# Patient Record
Sex: FEMALE | Race: WHITE | NOT HISPANIC OR LATINO | Employment: OTHER | URBAN - NONMETROPOLITAN AREA
[De-identification: names, ages, dates, MRNs, and addresses within clinical notes are randomized per-mention and may not be internally consistent; named-entity substitution may affect disease eponyms.]

---

## 2018-03-08 ENCOUNTER — HOSPITAL ENCOUNTER (INPATIENT)
Facility: HOSPITAL | Age: 83
LOS: 5 days | Discharge: NURSING, REHAB OR LONG-TERM CARE | End: 2018-03-14
Attending: EMERGENCY MEDICINE | Admitting: INTERNAL MEDICINE

## 2018-03-08 ENCOUNTER — APPOINTMENT (OUTPATIENT)
Dept: GENERAL RADIOLOGY | Facility: HOSPITAL | Age: 83
End: 2018-03-08

## 2018-03-08 DIAGNOSIS — Z78.9 IMPAIRED MOBILITY AND ADLS: ICD-10-CM

## 2018-03-08 DIAGNOSIS — Z91.81 STATUS POST FALL: ICD-10-CM

## 2018-03-08 DIAGNOSIS — Z74.09 IMPAIRED MOBILITY AND ADLS: ICD-10-CM

## 2018-03-08 DIAGNOSIS — Z74.09 IMPAIRED FUNCTIONAL MOBILITY, BALANCE, GAIT, AND ENDURANCE: ICD-10-CM

## 2018-03-08 DIAGNOSIS — S72.492A OTHER CLOSED FRACTURE OF DISTAL END OF LEFT FEMUR, INITIAL ENCOUNTER (HCC): Primary | ICD-10-CM

## 2018-03-08 PROBLEM — I10 ESSENTIAL HYPERTENSION: Status: ACTIVE | Noted: 2018-03-08

## 2018-03-08 PROBLEM — M19.90 OSTEOARTHRITIS: Status: ACTIVE | Noted: 2018-03-08

## 2018-03-08 PROBLEM — S72.402A CLOSED FRACTURE OF LEFT DISTAL FEMUR (HCC): Status: ACTIVE | Noted: 2018-03-08

## 2018-03-08 LAB
ALBUMIN SERPL-MCNC: 3.9 G/DL (ref 3.5–5)
ALBUMIN/GLOB SERPL: 1.3 G/DL (ref 1–2)
ALP SERPL-CCNC: 72 U/L (ref 38–126)
ALT SERPL W P-5'-P-CCNC: 29 U/L (ref 13–69)
ANION GAP SERPL CALCULATED.3IONS-SCNC: 20.5 MMOL/L
APTT PPP: 24.7 SECONDS (ref 25–36)
AST SERPL-CCNC: 20 U/L (ref 15–46)
BASOPHILS # BLD AUTO: 0.08 10*3/MM3 (ref 0–0.2)
BASOPHILS NFR BLD AUTO: 0.5 % (ref 0–2.5)
BILIRUB SERPL-MCNC: 1.3 MG/DL (ref 0.2–1.3)
BILIRUB UR QL STRIP: ABNORMAL
BUN BLD-MCNC: 14 MG/DL (ref 7–20)
BUN/CREAT SERPL: 23.3 (ref 7.1–23.5)
CALCIUM SPEC-SCNC: 9.4 MG/DL (ref 8.4–10.2)
CHLORIDE SERPL-SCNC: 105 MMOL/L (ref 98–107)
CLARITY UR: ABNORMAL
CO2 SERPL-SCNC: 20 MMOL/L (ref 26–30)
COLOR UR: YELLOW
CREAT BLD-MCNC: 0.6 MG/DL (ref 0.6–1.3)
DEPRECATED RDW RBC AUTO: 50.9 FL (ref 37–54)
EOSINOPHIL # BLD AUTO: 0.09 10*3/MM3 (ref 0–0.7)
EOSINOPHIL NFR BLD AUTO: 0.6 % (ref 0–7)
ERYTHROCYTE [DISTWIDTH] IN BLOOD BY AUTOMATED COUNT: 14.7 % (ref 11.5–14.5)
GFR SERPL CREATININE-BSD FRML MDRD: 93 ML/MIN/1.73
GLOBULIN UR ELPH-MCNC: 3.1 GM/DL
GLUCOSE BLD-MCNC: 164 MG/DL (ref 74–98)
GLUCOSE UR STRIP-MCNC: NEGATIVE MG/DL
HCT VFR BLD AUTO: 36.9 % (ref 37–47)
HGB BLD-MCNC: 12.5 G/DL (ref 12–16)
HGB UR QL STRIP.AUTO: NEGATIVE
IMM GRANULOCYTES # BLD: 0.07 10*3/MM3 (ref 0–0.06)
IMM GRANULOCYTES NFR BLD: 0.4 % (ref 0–0.6)
INR PPP: 1.27 (ref 0.9–1.1)
KETONES UR QL STRIP: ABNORMAL
LEUKOCYTE ESTERASE UR QL STRIP.AUTO: NEGATIVE
LYMPHOCYTES # BLD AUTO: 0.88 10*3/MM3 (ref 0.6–3.4)
LYMPHOCYTES NFR BLD AUTO: 5.5 % (ref 10–50)
MCH RBC QN AUTO: 31.9 PG (ref 27–31)
MCHC RBC AUTO-ENTMCNC: 33.9 G/DL (ref 30–37)
MCV RBC AUTO: 94.1 FL (ref 81–99)
MONOCYTES # BLD AUTO: 0.79 10*3/MM3 (ref 0–0.9)
MONOCYTES NFR BLD AUTO: 4.9 % (ref 0–12)
NEUTROPHILS # BLD AUTO: 14.16 10*3/MM3 (ref 2–6.9)
NEUTROPHILS NFR BLD AUTO: 88.1 % (ref 37–80)
NITRITE UR QL STRIP: NEGATIVE
NRBC BLD MANUAL-RTO: 0 /100 WBC (ref 0–0)
PH UR STRIP.AUTO: 5.5 [PH] (ref 5–8)
PLATELET # BLD AUTO: 327 10*3/MM3 (ref 130–400)
PMV BLD AUTO: 10.5 FL (ref 6–12)
POTASSIUM BLD-SCNC: 3.5 MMOL/L (ref 3.5–5.1)
PROT SERPL-MCNC: 7 G/DL (ref 6.3–8.2)
PROT UR QL STRIP: ABNORMAL
PROTHROMBIN TIME: 14.1 SECONDS (ref 9.3–12.1)
RBC # BLD AUTO: 3.92 10*6/MM3 (ref 4.2–5.4)
SODIUM BLD-SCNC: 142 MMOL/L (ref 137–145)
SP GR UR STRIP: 1.02 (ref 1–1.03)
TROPONIN I SERPL-MCNC: <0.012 NG/ML (ref 0–0.03)
UROBILINOGEN UR QL STRIP: ABNORMAL
WBC NRBC COR # BLD: 16.07 10*3/MM3 (ref 4.8–10.8)

## 2018-03-08 PROCEDURE — 71045 X-RAY EXAM CHEST 1 VIEW: CPT

## 2018-03-08 PROCEDURE — 84484 ASSAY OF TROPONIN QUANT: CPT | Performed by: EMERGENCY MEDICINE

## 2018-03-08 PROCEDURE — 99220 PR INITIAL OBSERVATION CARE/DAY 70 MINUTES: CPT | Performed by: INTERNAL MEDICINE

## 2018-03-08 PROCEDURE — 25010000002 FENTANYL CITRATE (PF) 100 MCG/2ML SOLUTION: Performed by: EMERGENCY MEDICINE

## 2018-03-08 PROCEDURE — 85610 PROTHROMBIN TIME: CPT | Performed by: EMERGENCY MEDICINE

## 2018-03-08 PROCEDURE — 80053 COMPREHEN METABOLIC PANEL: CPT | Performed by: EMERGENCY MEDICINE

## 2018-03-08 PROCEDURE — G0378 HOSPITAL OBSERVATION PER HR: HCPCS

## 2018-03-08 PROCEDURE — 87081 CULTURE SCREEN ONLY: CPT | Performed by: INTERNAL MEDICINE

## 2018-03-08 PROCEDURE — 25010000002 LORAZEPAM PER 2 MG

## 2018-03-08 PROCEDURE — 93005 ELECTROCARDIOGRAM TRACING: CPT | Performed by: INTERNAL MEDICINE

## 2018-03-08 PROCEDURE — P9612 CATHETERIZE FOR URINE SPEC: HCPCS

## 2018-03-08 PROCEDURE — 73552 X-RAY EXAM OF FEMUR 2/>: CPT

## 2018-03-08 PROCEDURE — 99284 EMERGENCY DEPT VISIT MOD MDM: CPT

## 2018-03-08 PROCEDURE — 93005 ELECTROCARDIOGRAM TRACING: CPT | Performed by: EMERGENCY MEDICINE

## 2018-03-08 PROCEDURE — 85730 THROMBOPLASTIN TIME PARTIAL: CPT | Performed by: EMERGENCY MEDICINE

## 2018-03-08 PROCEDURE — 81003 URINALYSIS AUTO W/O SCOPE: CPT | Performed by: EMERGENCY MEDICINE

## 2018-03-08 PROCEDURE — 85025 COMPLETE CBC W/AUTO DIFF WBC: CPT | Performed by: EMERGENCY MEDICINE

## 2018-03-08 PROCEDURE — 25010000002 LORAZEPAM PER 2 MG: Performed by: EMERGENCY MEDICINE

## 2018-03-08 RX ORDER — ACETAMINOPHEN 500 MG
500 TABLET ORAL EVERY 4 HOURS PRN
COMMUNITY
End: 2018-03-14 | Stop reason: HOSPADM

## 2018-03-08 RX ORDER — TRAVOPROST OPHTHALMIC SOLUTION 0.04 MG/ML
1 SOLUTION OPHTHALMIC EVERY EVENING
COMMUNITY

## 2018-03-08 RX ORDER — SODIUM CHLORIDE 9 MG/ML
125 INJECTION, SOLUTION INTRAVENOUS CONTINUOUS
Status: DISCONTINUED | OUTPATIENT
Start: 2018-03-09 | End: 2018-03-11

## 2018-03-08 RX ORDER — HYDROCODONE BITARTRATE AND ACETAMINOPHEN 5; 325 MG/1; MG/1
1 TABLET ORAL EVERY 6 HOURS
Status: ON HOLD | COMMUNITY
End: 2018-03-14

## 2018-03-08 RX ORDER — AMLODIPINE BESYLATE 5 MG/1
5 TABLET ORAL DAILY
Status: DISCONTINUED | OUTPATIENT
Start: 2018-03-09 | End: 2018-03-10

## 2018-03-08 RX ORDER — NYSTATIN 100000 [USP'U]/G
1 POWDER TOPICAL DAILY
COMMUNITY
End: 2018-03-14 | Stop reason: HOSPADM

## 2018-03-08 RX ORDER — SODIUM CHLORIDE 0.9 % (FLUSH) 0.9 %
1-10 SYRINGE (ML) INJECTION AS NEEDED
Status: DISCONTINUED | OUTPATIENT
Start: 2018-03-08 | End: 2018-03-14 | Stop reason: HOSPADM

## 2018-03-08 RX ORDER — MORPHINE SULFATE 2 MG/ML
1 INJECTION, SOLUTION INTRAMUSCULAR; INTRAVENOUS EVERY 4 HOURS PRN
Status: DISCONTINUED | OUTPATIENT
Start: 2018-03-08 | End: 2018-03-09

## 2018-03-08 RX ORDER — TRAMADOL HYDROCHLORIDE 50 MG/1
50 TABLET ORAL 4 TIMES DAILY PRN
Status: ON HOLD | COMMUNITY
End: 2018-03-14

## 2018-03-08 RX ORDER — FENTANYL CITRATE 50 UG/ML
50 INJECTION, SOLUTION INTRAMUSCULAR; INTRAVENOUS ONCE
Status: COMPLETED | OUTPATIENT
Start: 2018-03-08 | End: 2018-03-08

## 2018-03-08 RX ORDER — AMLODIPINE BESYLATE 5 MG/1
5 TABLET ORAL DAILY
COMMUNITY

## 2018-03-08 RX ORDER — LORAZEPAM 2 MG/ML
INJECTION INTRAMUSCULAR
Status: COMPLETED
Start: 2018-03-08 | End: 2018-03-08

## 2018-03-08 RX ORDER — ACETAMINOPHEN 325 MG/1
650 TABLET ORAL EVERY 4 HOURS PRN
Status: DISCONTINUED | OUTPATIENT
Start: 2018-03-08 | End: 2018-03-09

## 2018-03-08 RX ORDER — LORAZEPAM 0.5 MG/1
0.5 TABLET ORAL 2 TIMES DAILY PRN
COMMUNITY
End: 2018-03-14 | Stop reason: HOSPADM

## 2018-03-08 RX ORDER — LORAZEPAM 2 MG/ML
INJECTION INTRAMUSCULAR
Status: DISPENSED
Start: 2018-03-08 | End: 2018-03-09

## 2018-03-08 RX ORDER — IPRATROPIUM BROMIDE AND ALBUTEROL SULFATE 2.5; .5 MG/3ML; MG/3ML
3 SOLUTION RESPIRATORY (INHALATION) EVERY 4 HOURS PRN
COMMUNITY
End: 2018-03-14 | Stop reason: HOSPADM

## 2018-03-08 RX ORDER — UREA 10 %
3 LOTION (ML) TOPICAL NIGHTLY
Status: ON HOLD | COMMUNITY
End: 2018-03-09

## 2018-03-08 RX ORDER — LATANOPROST 50 UG/ML
1 SOLUTION/ DROPS OPHTHALMIC NIGHTLY
Status: DISCONTINUED | OUTPATIENT
Start: 2018-03-09 | End: 2018-03-14 | Stop reason: HOSPADM

## 2018-03-08 RX ORDER — ALUMINA, MAGNESIA, AND SIMETHICONE 2400; 2400; 240 MG/30ML; MG/30ML; MG/30ML
30 SUSPENSION ORAL EVERY 6 HOURS PRN
COMMUNITY
End: 2018-03-14 | Stop reason: HOSPADM

## 2018-03-08 RX ORDER — LORAZEPAM 2 MG/ML
0.5 INJECTION INTRAMUSCULAR ONCE
Status: COMPLETED | OUTPATIENT
Start: 2018-03-08 | End: 2018-03-08

## 2018-03-08 RX ORDER — NALOXONE HCL 0.4 MG/ML
0.4 VIAL (ML) INJECTION
Status: DISCONTINUED | OUTPATIENT
Start: 2018-03-08 | End: 2018-03-09

## 2018-03-08 RX ORDER — ONDANSETRON 2 MG/ML
4 INJECTION INTRAMUSCULAR; INTRAVENOUS EVERY 6 HOURS PRN
Status: DISCONTINUED | OUTPATIENT
Start: 2018-03-08 | End: 2018-03-14 | Stop reason: HOSPADM

## 2018-03-08 RX ORDER — HYDROCODONE BITARTRATE AND ACETAMINOPHEN 5; 325 MG/1; MG/1
1 TABLET ORAL EVERY 6 HOURS PRN
Status: DISCONTINUED | OUTPATIENT
Start: 2018-03-08 | End: 2018-03-14 | Stop reason: HOSPADM

## 2018-03-08 RX ADMIN — LORAZEPAM 0.5 MG: 2 INJECTION INTRAMUSCULAR; INTRAVENOUS at 22:02

## 2018-03-08 RX ADMIN — LORAZEPAM 0.5 MG: 2 INJECTION INTRAMUSCULAR; INTRAVENOUS at 22:22

## 2018-03-08 RX ADMIN — FENTANYL CITRATE 50 MCG: 50 INJECTION, SOLUTION INTRAMUSCULAR; INTRAVENOUS at 20:50

## 2018-03-08 RX ADMIN — LORAZEPAM 0.5 MG: 2 INJECTION INTRAMUSCULAR at 22:02

## 2018-03-08 RX ADMIN — FENTANYL CITRATE 50 MCG: 50 INJECTION INTRAMUSCULAR; INTRAVENOUS at 20:11

## 2018-03-09 ENCOUNTER — APPOINTMENT (OUTPATIENT)
Dept: GENERAL RADIOLOGY | Facility: HOSPITAL | Age: 83
End: 2018-03-09

## 2018-03-09 ENCOUNTER — ANESTHESIA (OUTPATIENT)
Dept: PERIOP | Facility: HOSPITAL | Age: 83
End: 2018-03-09

## 2018-03-09 ENCOUNTER — ANESTHESIA EVENT (OUTPATIENT)
Dept: PERIOP | Facility: HOSPITAL | Age: 83
End: 2018-03-09

## 2018-03-09 LAB
ABO GROUP BLD: NORMAL
ABO GROUP BLD: NORMAL
ALBUMIN SERPL-MCNC: 3.7 G/DL (ref 3.5–5)
ALBUMIN/GLOB SERPL: 1.3 G/DL (ref 1–2)
ALP SERPL-CCNC: 66 U/L (ref 38–126)
ALT SERPL W P-5'-P-CCNC: 30 U/L (ref 13–69)
ANION GAP SERPL CALCULATED.3IONS-SCNC: 17.4 MMOL/L
ANION GAP SERPL CALCULATED.3IONS-SCNC: 21.9 MMOL/L
APTT PPP: 25.3 SECONDS (ref 25–36)
AST SERPL-CCNC: 20 U/L (ref 15–46)
BACTERIA UR QL AUTO: ABNORMAL /HPF
BASOPHILS # BLD AUTO: 0.07 10*3/MM3 (ref 0–0.2)
BASOPHILS # BLD AUTO: 0.07 10*3/MM3 (ref 0–0.2)
BASOPHILS NFR BLD AUTO: 0.6 % (ref 0–2.5)
BASOPHILS NFR BLD AUTO: 0.7 % (ref 0–2.5)
BILIRUB SERPL-MCNC: 1.6 MG/DL (ref 0.2–1.3)
BILIRUB UR QL STRIP: ABNORMAL
BLD GP AB SCN SERPL QL: NEGATIVE
BUN BLD-MCNC: 12 MG/DL (ref 7–20)
BUN BLD-MCNC: 9 MG/DL (ref 7–20)
BUN/CREAT SERPL: 18 (ref 7.1–23.5)
BUN/CREAT SERPL: 24 (ref 7.1–23.5)
CALCIUM SPEC-SCNC: 8.8 MG/DL (ref 8.4–10.2)
CALCIUM SPEC-SCNC: 9.5 MG/DL (ref 8.4–10.2)
CHLORIDE SERPL-SCNC: 107 MMOL/L (ref 98–107)
CHLORIDE SERPL-SCNC: 109 MMOL/L (ref 98–107)
CK SERPL-CCNC: 33 U/L (ref 30–170)
CLARITY UR: ABNORMAL
CO2 SERPL-SCNC: 21 MMOL/L (ref 26–30)
CO2 SERPL-SCNC: 21 MMOL/L (ref 26–30)
COLOR UR: YELLOW
CREAT BLD-MCNC: 0.5 MG/DL (ref 0.6–1.3)
CREAT BLD-MCNC: 0.5 MG/DL (ref 0.6–1.3)
DEPRECATED RDW RBC AUTO: 48.5 FL (ref 37–54)
DEPRECATED RDW RBC AUTO: 50.8 FL (ref 37–54)
EOSINOPHIL # BLD AUTO: 0.01 10*3/MM3 (ref 0–0.7)
EOSINOPHIL # BLD AUTO: 0.06 10*3/MM3 (ref 0–0.7)
EOSINOPHIL NFR BLD AUTO: 0.1 % (ref 0–7)
EOSINOPHIL NFR BLD AUTO: 0.6 % (ref 0–7)
ERYTHROCYTE [DISTWIDTH] IN BLOOD BY AUTOMATED COUNT: 14.6 % (ref 11.5–14.5)
ERYTHROCYTE [DISTWIDTH] IN BLOOD BY AUTOMATED COUNT: 14.7 % (ref 11.5–14.5)
GFR SERPL CREATININE-BSD FRML MDRD: 115 ML/MIN/1.73
GFR SERPL CREATININE-BSD FRML MDRD: 115 ML/MIN/1.73
GLOBULIN UR ELPH-MCNC: 2.8 GM/DL
GLUCOSE BLD-MCNC: 117 MG/DL (ref 74–98)
GLUCOSE BLD-MCNC: 123 MG/DL (ref 74–98)
GLUCOSE UR STRIP-MCNC: NEGATIVE MG/DL
HCT VFR BLD AUTO: 31.6 % (ref 37–47)
HCT VFR BLD AUTO: 36 % (ref 37–47)
HCT VFR BLD AUTO: 38.2 % (ref 37–47)
HGB BLD-MCNC: 10.3 G/DL (ref 12–16)
HGB BLD-MCNC: 12.2 G/DL (ref 12–16)
HGB BLD-MCNC: 13.2 G/DL (ref 12–16)
HGB UR QL STRIP.AUTO: NEGATIVE
HYALINE CASTS UR QL AUTO: ABNORMAL /LPF
IMM GRANULOCYTES # BLD: 0.05 10*3/MM3 (ref 0–0.06)
IMM GRANULOCYTES # BLD: 0.08 10*3/MM3 (ref 0–0.06)
IMM GRANULOCYTES NFR BLD: 0.5 % (ref 0–0.6)
IMM GRANULOCYTES NFR BLD: 0.7 % (ref 0–0.6)
INR PPP: 1.31 (ref 0.9–1.1)
KETONES UR QL STRIP: ABNORMAL
LEUKOCYTE ESTERASE UR QL STRIP.AUTO: NEGATIVE
LYMPHOCYTES # BLD AUTO: 1.21 10*3/MM3 (ref 0.6–3.4)
LYMPHOCYTES # BLD AUTO: 1.28 10*3/MM3 (ref 0.6–3.4)
LYMPHOCYTES NFR BLD AUTO: 10.7 % (ref 10–50)
LYMPHOCYTES NFR BLD AUTO: 11.7 % (ref 10–50)
MAGNESIUM SERPL-MCNC: 1.9 MG/DL (ref 1.6–2.3)
MCH RBC QN AUTO: 31.6 PG (ref 27–31)
MCH RBC QN AUTO: 31.9 PG (ref 27–31)
MCHC RBC AUTO-ENTMCNC: 33.9 G/DL (ref 30–37)
MCHC RBC AUTO-ENTMCNC: 34.6 G/DL (ref 30–37)
MCV RBC AUTO: 91.4 FL (ref 81–99)
MCV RBC AUTO: 94 FL (ref 81–99)
MONOCYTES # BLD AUTO: 0.94 10*3/MM3 (ref 0–0.9)
MONOCYTES # BLD AUTO: 0.94 10*3/MM3 (ref 0–0.9)
MONOCYTES NFR BLD AUTO: 7.9 % (ref 0–12)
MONOCYTES NFR BLD AUTO: 9.1 % (ref 0–12)
NEUTROPHILS # BLD AUTO: 7.99 10*3/MM3 (ref 2–6.9)
NEUTROPHILS # BLD AUTO: 9.53 10*3/MM3 (ref 2–6.9)
NEUTROPHILS NFR BLD AUTO: 77.4 % (ref 37–80)
NEUTROPHILS NFR BLD AUTO: 80 % (ref 37–80)
NITRITE UR QL STRIP: NEGATIVE
NRBC BLD MANUAL-RTO: 0 /100 WBC (ref 0–0)
NRBC BLD MANUAL-RTO: 0 /100 WBC (ref 0–0)
PH UR STRIP.AUTO: 7 [PH] (ref 5–8)
PLATELET # BLD AUTO: 332 10*3/MM3 (ref 130–400)
PLATELET # BLD AUTO: 336 10*3/MM3 (ref 130–400)
PMV BLD AUTO: 10.2 FL (ref 6–12)
PMV BLD AUTO: 11.3 FL (ref 6–12)
POTASSIUM BLD-SCNC: 3.4 MMOL/L (ref 3.5–5.1)
POTASSIUM BLD-SCNC: 3.9 MMOL/L (ref 3.5–5.1)
PROT SERPL-MCNC: 6.5 G/DL (ref 6.3–8.2)
PROT UR QL STRIP: ABNORMAL
PROTHROMBIN TIME: 14.6 SECONDS (ref 9.3–12.1)
RBC # BLD AUTO: 3.83 10*6/MM3 (ref 4.2–5.4)
RBC # BLD AUTO: 4.18 10*6/MM3 (ref 4.2–5.4)
RBC # UR: ABNORMAL /HPF
REF LAB TEST METHOD: ABNORMAL
RH BLD: POSITIVE
RH BLD: POSITIVE
SODIUM BLD-SCNC: 144 MMOL/L (ref 137–145)
SODIUM BLD-SCNC: 146 MMOL/L (ref 137–145)
SP GR UR STRIP: 1.02 (ref 1–1.03)
SQUAMOUS #/AREA URNS HPF: ABNORMAL /HPF
TROPONIN I SERPL-MCNC: <0.012 NG/ML (ref 0–0.03)
UROBILINOGEN UR QL STRIP: ABNORMAL
WBC NRBC COR # BLD: 10.32 10*3/MM3 (ref 4.8–10.8)
WBC NRBC COR # BLD: 11.91 10*3/MM3 (ref 4.8–10.8)
WBC UR QL AUTO: ABNORMAL /HPF

## 2018-03-09 PROCEDURE — 71045 X-RAY EXAM CHEST 1 VIEW: CPT

## 2018-03-09 PROCEDURE — C1713 ANCHOR/SCREW BN/BN,TIS/BN: HCPCS | Performed by: ORTHOPAEDIC SURGERY

## 2018-03-09 PROCEDURE — 82550 ASSAY OF CK (CPK): CPT | Performed by: INTERNAL MEDICINE

## 2018-03-09 PROCEDURE — 87086 URINE CULTURE/COLONY COUNT: CPT | Performed by: ORTHOPAEDIC SURGERY

## 2018-03-09 PROCEDURE — 86900 BLOOD TYPING SEROLOGIC ABO: CPT | Performed by: ORTHOPAEDIC SURGERY

## 2018-03-09 PROCEDURE — 0QSC04Z REPOSITION LEFT LOWER FEMUR WITH INTERNAL FIXATION DEVICE, OPEN APPROACH: ICD-10-PCS | Performed by: ORTHOPAEDIC SURGERY

## 2018-03-09 PROCEDURE — 85025 COMPLETE CBC W/AUTO DIFF WBC: CPT | Performed by: ORTHOPAEDIC SURGERY

## 2018-03-09 PROCEDURE — 25010000002 FENTANYL CITRATE (PF) 100 MCG/2ML SOLUTION: Performed by: NURSE ANESTHETIST, CERTIFIED REGISTERED

## 2018-03-09 PROCEDURE — 27506 TREATMENT OF THIGH FRACTURE: CPT | Performed by: ORTHOPAEDIC SURGERY

## 2018-03-09 PROCEDURE — 86901 BLOOD TYPING SEROLOGIC RH(D): CPT

## 2018-03-09 PROCEDURE — 76000 FLUOROSCOPY <1 HR PHYS/QHP: CPT

## 2018-03-09 PROCEDURE — 94799 UNLISTED PULMONARY SVC/PX: CPT

## 2018-03-09 PROCEDURE — 25010000002 MORPHINE PER 10 MG: Performed by: INTERNAL MEDICINE

## 2018-03-09 PROCEDURE — 99221 1ST HOSP IP/OBS SF/LOW 40: CPT | Performed by: PHYSICIAN ASSISTANT

## 2018-03-09 PROCEDURE — 85014 HEMATOCRIT: CPT | Performed by: INTERNAL MEDICINE

## 2018-03-09 PROCEDURE — 25010000002 ONDANSETRON PER 1 MG: Performed by: NURSE ANESTHETIST, CERTIFIED REGISTERED

## 2018-03-09 PROCEDURE — 94640 AIRWAY INHALATION TREATMENT: CPT

## 2018-03-09 PROCEDURE — 83735 ASSAY OF MAGNESIUM: CPT | Performed by: INTERNAL MEDICINE

## 2018-03-09 PROCEDURE — 85730 THROMBOPLASTIN TIME PARTIAL: CPT | Performed by: ORTHOPAEDIC SURGERY

## 2018-03-09 PROCEDURE — 86900 BLOOD TYPING SEROLOGIC ABO: CPT

## 2018-03-09 PROCEDURE — 81001 URINALYSIS AUTO W/SCOPE: CPT | Performed by: ORTHOPAEDIC SURGERY

## 2018-03-09 PROCEDURE — 86901 BLOOD TYPING SEROLOGIC RH(D): CPT | Performed by: ORTHOPAEDIC SURGERY

## 2018-03-09 PROCEDURE — 80048 BASIC METABOLIC PNL TOTAL CA: CPT | Performed by: INTERNAL MEDICINE

## 2018-03-09 PROCEDURE — 93005 ELECTROCARDIOGRAM TRACING: CPT | Performed by: ORTHOPAEDIC SURGERY

## 2018-03-09 PROCEDURE — 25010000002 PROPOFOL 200 MG/20ML EMULSION: Performed by: NURSE ANESTHETIST, CERTIFIED REGISTERED

## 2018-03-09 PROCEDURE — 25010000002 DEXAMETHASONE PER 1 MG: Performed by: NURSE ANESTHETIST, CERTIFIED REGISTERED

## 2018-03-09 PROCEDURE — 85610 PROTHROMBIN TIME: CPT | Performed by: ORTHOPAEDIC SURGERY

## 2018-03-09 PROCEDURE — 85018 HEMOGLOBIN: CPT | Performed by: INTERNAL MEDICINE

## 2018-03-09 PROCEDURE — 84484 ASSAY OF TROPONIN QUANT: CPT | Performed by: INTERNAL MEDICINE

## 2018-03-09 PROCEDURE — 86850 RBC ANTIBODY SCREEN: CPT | Performed by: ORTHOPAEDIC SURGERY

## 2018-03-09 PROCEDURE — 85025 COMPLETE CBC W/AUTO DIFF WBC: CPT | Performed by: INTERNAL MEDICINE

## 2018-03-09 PROCEDURE — 25010000002 METOCLOPRAMIDE PER 10 MG: Performed by: NURSE ANESTHETIST, CERTIFIED REGISTERED

## 2018-03-09 DEVICE — IMPLANTABLE DEVICE: Type: IMPLANTABLE DEVICE | Site: FEMUR | Status: FUNCTIONAL

## 2018-03-09 DEVICE — SCRW CORT DBL LD THRD TI 5X32MM: Type: IMPLANTABLE DEVICE | Site: FEMUR | Status: FUNCTIONAL

## 2018-03-09 RX ORDER — ONDANSETRON 2 MG/ML
4 INJECTION INTRAMUSCULAR; INTRAVENOUS EVERY 6 HOURS PRN
Status: DISCONTINUED | OUTPATIENT
Start: 2018-03-09 | End: 2018-03-14 | Stop reason: HOSPADM

## 2018-03-09 RX ORDER — MORPHINE SULFATE 2 MG/ML
2 INJECTION, SOLUTION INTRAMUSCULAR; INTRAVENOUS
Status: DISCONTINUED | OUTPATIENT
Start: 2018-03-09 | End: 2018-03-10

## 2018-03-09 RX ORDER — ROCURONIUM BROMIDE 10 MG/ML
INJECTION, SOLUTION INTRAVENOUS AS NEEDED
Status: DISCONTINUED | OUTPATIENT
Start: 2018-03-09 | End: 2018-03-09 | Stop reason: SURG

## 2018-03-09 RX ORDER — LANOLIN ALCOHOL/MO/W.PET/CERES
3 CREAM (GRAM) TOPICAL NIGHTLY
COMMUNITY

## 2018-03-09 RX ORDER — MORPHINE SULFATE 2 MG/ML
2 INJECTION, SOLUTION INTRAMUSCULAR; INTRAVENOUS
Status: DISCONTINUED | OUTPATIENT
Start: 2018-03-09 | End: 2018-03-09 | Stop reason: HOSPADM

## 2018-03-09 RX ORDER — ONDANSETRON 2 MG/ML
INJECTION INTRAMUSCULAR; INTRAVENOUS AS NEEDED
Status: DISCONTINUED | OUTPATIENT
Start: 2018-03-09 | End: 2018-03-09 | Stop reason: SURG

## 2018-03-09 RX ORDER — NALOXONE HCL 0.4 MG/ML
0.4 VIAL (ML) INJECTION
Status: DISCONTINUED | OUTPATIENT
Start: 2018-03-09 | End: 2018-03-14 | Stop reason: HOSPADM

## 2018-03-09 RX ORDER — SODIUM CHLORIDE, SODIUM LACTATE, POTASSIUM CHLORIDE, CALCIUM CHLORIDE 600; 310; 30; 20 MG/100ML; MG/100ML; MG/100ML; MG/100ML
100 INJECTION, SOLUTION INTRAVENOUS CONTINUOUS
Status: DISCONTINUED | OUTPATIENT
Start: 2018-03-09 | End: 2018-03-09

## 2018-03-09 RX ORDER — NALOXONE HCL 0.4 MG/ML
0.4 VIAL (ML) INJECTION
Status: DISCONTINUED | OUTPATIENT
Start: 2018-03-09 | End: 2018-03-09

## 2018-03-09 RX ORDER — MEPERIDINE HYDROCHLORIDE 50 MG/ML
25 INJECTION INTRAMUSCULAR; INTRAVENOUS; SUBCUTANEOUS
Status: DISCONTINUED | OUTPATIENT
Start: 2018-03-09 | End: 2018-03-09 | Stop reason: HOSPADM

## 2018-03-09 RX ORDER — FENTANYL CITRATE 50 UG/ML
INJECTION, SOLUTION INTRAMUSCULAR; INTRAVENOUS AS NEEDED
Status: DISCONTINUED | OUTPATIENT
Start: 2018-03-09 | End: 2018-03-09 | Stop reason: SURG

## 2018-03-09 RX ORDER — CLINDAMYCIN PHOSPHATE 600 MG/50ML
600 INJECTION, SOLUTION INTRAVENOUS EVERY 8 HOURS
Status: COMPLETED | OUTPATIENT
Start: 2018-03-10 | End: 2018-03-10

## 2018-03-09 RX ORDER — PROPOFOL 10 MG/ML
INJECTION, EMULSION INTRAVENOUS AS NEEDED
Status: DISCONTINUED | OUTPATIENT
Start: 2018-03-09 | End: 2018-03-09 | Stop reason: SURG

## 2018-03-09 RX ORDER — IPRATROPIUM BROMIDE AND ALBUTEROL SULFATE 2.5; .5 MG/3ML; MG/3ML
SOLUTION RESPIRATORY (INHALATION)
Status: COMPLETED
Start: 2018-03-09 | End: 2018-03-09

## 2018-03-09 RX ORDER — CLINDAMYCIN PHOSPHATE 900 MG/50ML
900 INJECTION, SOLUTION INTRAVENOUS ONCE
Status: COMPLETED | OUTPATIENT
Start: 2018-03-09 | End: 2018-03-09

## 2018-03-09 RX ORDER — MORPHINE SULFATE 2 MG/ML
2 INJECTION, SOLUTION INTRAMUSCULAR; INTRAVENOUS
Status: DISCONTINUED | OUTPATIENT
Start: 2018-03-09 | End: 2018-03-09

## 2018-03-09 RX ORDER — CLINDAMYCIN PHOSPHATE 150 MG/ML
INJECTION, SOLUTION INTRAVENOUS
Status: COMPLETED
Start: 2018-03-09 | End: 2018-03-09

## 2018-03-09 RX ORDER — SODIUM CHLORIDE 0.9 % (FLUSH) 0.9 %
1-10 SYRINGE (ML) INJECTION AS NEEDED
Status: DISCONTINUED | OUTPATIENT
Start: 2018-03-09 | End: 2018-03-14 | Stop reason: HOSPADM

## 2018-03-09 RX ORDER — DOCUSATE SODIUM 100 MG/1
100 CAPSULE, LIQUID FILLED ORAL 2 TIMES DAILY PRN
Status: DISCONTINUED | OUTPATIENT
Start: 2018-03-09 | End: 2018-03-14 | Stop reason: HOSPADM

## 2018-03-09 RX ORDER — DEXAMETHASONE SODIUM PHOSPHATE 4 MG/ML
INJECTION, SOLUTION INTRA-ARTICULAR; INTRALESIONAL; INTRAMUSCULAR; INTRAVENOUS; SOFT TISSUE AS NEEDED
Status: DISCONTINUED | OUTPATIENT
Start: 2018-03-09 | End: 2018-03-09 | Stop reason: SURG

## 2018-03-09 RX ORDER — BISACODYL 10 MG
10 SUPPOSITORY, RECTAL RECTAL DAILY PRN
Status: DISCONTINUED | OUTPATIENT
Start: 2018-03-09 | End: 2018-03-14 | Stop reason: HOSPADM

## 2018-03-09 RX ORDER — CHOLECALCIFEROL (VITAMIN D3) 50 MCG
2000 TABLET ORAL DAILY
COMMUNITY
End: 2021-01-14 | Stop reason: HOSPADM

## 2018-03-09 RX ORDER — IPRATROPIUM BROMIDE AND ALBUTEROL SULFATE 2.5; .5 MG/3ML; MG/3ML
3 SOLUTION RESPIRATORY (INHALATION) ONCE
Status: COMPLETED | OUTPATIENT
Start: 2018-03-09 | End: 2018-03-09

## 2018-03-09 RX ORDER — ESMOLOL HYDROCHLORIDE 10 MG/ML
INJECTION INTRAVENOUS AS NEEDED
Status: DISCONTINUED | OUTPATIENT
Start: 2018-03-09 | End: 2018-03-09 | Stop reason: SURG

## 2018-03-09 RX ORDER — ONDANSETRON 2 MG/ML
4 INJECTION INTRAMUSCULAR; INTRAVENOUS ONCE AS NEEDED
Status: DISCONTINUED | OUTPATIENT
Start: 2018-03-09 | End: 2018-03-09 | Stop reason: HOSPADM

## 2018-03-09 RX ORDER — METOCLOPRAMIDE HYDROCHLORIDE 5 MG/ML
INJECTION INTRAMUSCULAR; INTRAVENOUS AS NEEDED
Status: DISCONTINUED | OUTPATIENT
Start: 2018-03-09 | End: 2018-03-09 | Stop reason: SURG

## 2018-03-09 RX ORDER — ONDANSETRON 4 MG/1
4 TABLET, ORALLY DISINTEGRATING ORAL EVERY 6 HOURS PRN
Status: DISCONTINUED | OUTPATIENT
Start: 2018-03-09 | End: 2018-03-14 | Stop reason: HOSPADM

## 2018-03-09 RX ORDER — LANOLIN ALCOHOL/MO/W.PET/CERES
3 CREAM (GRAM) TOPICAL NIGHTLY
Status: DISCONTINUED | OUTPATIENT
Start: 2018-03-09 | End: 2018-03-14 | Stop reason: HOSPADM

## 2018-03-09 RX ORDER — ONDANSETRON 4 MG/1
4 TABLET, FILM COATED ORAL EVERY 6 HOURS PRN
Status: DISCONTINUED | OUTPATIENT
Start: 2018-03-09 | End: 2018-03-14 | Stop reason: HOSPADM

## 2018-03-09 RX ADMIN — MORPHINE SULFATE 1 MG: 2 INJECTION, SOLUTION INTRAMUSCULAR; INTRAVENOUS at 04:42

## 2018-03-09 RX ADMIN — CLINDAMYCIN PHOSPHATE: 150 INJECTION, SOLUTION INTRAMUSCULAR; INTRAVENOUS at 21:00

## 2018-03-09 RX ADMIN — Medication 10 ML: at 12:51

## 2018-03-09 RX ADMIN — Medication 10 ML: at 14:35

## 2018-03-09 RX ADMIN — MORPHINE SULFATE 2 MG: 2 INJECTION, SOLUTION INTRAMUSCULAR; INTRAVENOUS at 08:38

## 2018-03-09 RX ADMIN — FENTANYL CITRATE 25 MCG: 50 INJECTION, SOLUTION INTRAMUSCULAR; INTRAVENOUS at 18:39

## 2018-03-09 RX ADMIN — PROPOFOL 60 MG: 10 INJECTION, EMULSION INTRAVENOUS at 18:05

## 2018-03-09 RX ADMIN — ESMOLOL HYDROCHLORIDE 30 MG: 10 INJECTION, SOLUTION INTRAVENOUS at 20:25

## 2018-03-09 RX ADMIN — FENTANYL CITRATE 25 MCG: 50 INJECTION, SOLUTION INTRAMUSCULAR; INTRAVENOUS at 19:54

## 2018-03-09 RX ADMIN — MORPHINE SULFATE 2 MG: 2 INJECTION, SOLUTION INTRAMUSCULAR; INTRAVENOUS at 12:51

## 2018-03-09 RX ADMIN — DEXAMETHASONE SODIUM PHOSPHATE 8 MG: 4 INJECTION, SOLUTION INTRAMUSCULAR; INTRAVENOUS at 18:07

## 2018-03-09 RX ADMIN — MORPHINE SULFATE 1 MG: 2 INJECTION, SOLUTION INTRAMUSCULAR; INTRAVENOUS at 00:14

## 2018-03-09 RX ADMIN — Medication 200 MCG: at 19:00

## 2018-03-09 RX ADMIN — IPRATROPIUM BROMIDE AND ALBUTEROL SULFATE 3 ML: 2.5; .5 SOLUTION RESPIRATORY (INHALATION) at 21:25

## 2018-03-09 RX ADMIN — FENTANYL CITRATE 25 MCG: 50 INJECTION, SOLUTION INTRAMUSCULAR; INTRAVENOUS at 18:45

## 2018-03-09 RX ADMIN — Medication 100 MCG: at 20:03

## 2018-03-09 RX ADMIN — MORPHINE SULFATE 2 MG: 2 INJECTION, SOLUTION INTRAMUSCULAR; INTRAVENOUS at 06:18

## 2018-03-09 RX ADMIN — METOCLOPRAMIDE 10 MG: 5 INJECTION, SOLUTION INTRAMUSCULAR; INTRAVENOUS at 18:09

## 2018-03-09 RX ADMIN — SODIUM CHLORIDE 75 ML/HR: 9 INJECTION, SOLUTION INTRAVENOUS at 00:30

## 2018-03-09 RX ADMIN — SODIUM CHLORIDE 1000 ML: 9 INJECTION, SOLUTION INTRAVENOUS at 22:53

## 2018-03-09 RX ADMIN — SODIUM CHLORIDE: 9 INJECTION, SOLUTION INTRAVENOUS at 17:59

## 2018-03-09 RX ADMIN — Medication 100 MCG: at 20:13

## 2018-03-09 RX ADMIN — ONDANSETRON 4 MG: 2 INJECTION INTRAMUSCULAR; INTRAVENOUS at 18:07

## 2018-03-09 RX ADMIN — FENTANYL CITRATE 25 MCG: 50 INJECTION, SOLUTION INTRAMUSCULAR; INTRAVENOUS at 19:17

## 2018-03-09 RX ADMIN — SODIUM CHLORIDE: 9 INJECTION, SOLUTION INTRAVENOUS at 20:12

## 2018-03-09 RX ADMIN — LIDOCAINE HYDROCHLORIDE 100 MG: 20 INJECTION, SOLUTION INTRAVENOUS at 20:29

## 2018-03-09 RX ADMIN — CLINDAMYCIN PHOSPHATE 900 MG: 900 INJECTION, SOLUTION INTRAVENOUS at 17:59

## 2018-03-09 RX ADMIN — FAMOTIDINE 20 MG: 10 INJECTION, SOLUTION INTRAVENOUS at 14:35

## 2018-03-09 RX ADMIN — LIDOCAINE HYDROCHLORIDE 100 MG: 20 INJECTION, SOLUTION INTRAVENOUS at 18:05

## 2018-03-09 RX ADMIN — ROCURONIUM BROMIDE 10 MG: 10 INJECTION INTRAVENOUS at 18:05

## 2018-03-09 RX ADMIN — IPRATROPIUM BROMIDE AND ALBUTEROL SULFATE 3 ML: .5; 3 SOLUTION RESPIRATORY (INHALATION) at 21:25

## 2018-03-09 NOTE — ED NOTES
I CALLED AND SPOKE WITH CHRIS FROM [facility] WHO IS PTS NURSE. SHE SAYS PT WAS XRAYED EARLIER THIS WEEK. SHE HAS BEEN OFF A FEW DAYS AND IS UNAWARE OF PT BEING IN PAIN PRIOR TO TODAY.      Sheridan Schultz RN  03/08/18 5272

## 2018-03-09 NOTE — CONSULTS
[facility]   HISTORY AND PHYSICAL      Name:  Stefania Kirkpatrick   Age:  89 y.o.  Sex:  female  :  1924  MRN:  [id number]   Visit Number:  [id number]  Admission Date:  3/8/2018  Date Of Service:  18  Primary Care Physician:  Abilio Garcia MD    Chief Complaint:     Patient is being seen in consultation at the request of [facility] hospitalist for the evaluation of left distal femur fracture.    History Of Presenting Illness:      Patient is a 89-year-old  female with a history significant for high blood pressure and osteoarthritis as evident with joint replacements, is being seen and evaluated for left distal femur displaced fracture  Most of the history of present illness comes from hospital charts and most recent hospital admission.  The patient does have dementia and does not provide information.    Patient sustained a mechanical fall at the nursing home facility beginning of 2018 she did have a left hip x-ray which was negative for acute fracture.  According to chart notes, the patient continued to have pain and was unable to transfer or ambulate.  She was transferred to the emergency room and diagnosed with a distal femur fracture.          Review Of Systems:    Unable to obtain review of systems due to the patient's current medical condition which includes dementia with confusion     Past Medical History:    Past Medical History:   Diagnosis Date   • Hypertension    • Hypertensive heart disease without CHF    • Hypoxemia    • Osteoarthritis    • Shoulder joint contracture, right        Past Surgical history:    Left Total knee replacement  Right total hip replacement    Social History:    Patient is       Family History:    History reviewed. No pertinent family history.    Allergies:      Ampicillin and Sulfa antibiotics    Home Medications:    Prior to Admission Medications     Prescriptions Last Dose Informant Patient Reported? Taking?     acetaminophen (TYLENOL) 500 MG tablet 8/27/2016  Yes Yes    Take 500 mg by mouth Every 4 (Four) Hours As Needed for Mild Pain  or Fever.    aluminum-magnesium hydroxide-simethicone (MAALOX MAX) 400-400-40 MG/5ML suspension 8/31/2017  Yes Yes    Take 30 mL by mouth Every 6 (Six) Hours As Needed for Indigestion or Heartburn (heartburn).    amLODIPine (NORVASC) 5 MG tablet 10/14/2017  Yes Yes    Take 5 mg by mouth Daily.    Cholecalciferol (VITAMIN D3) 5000 units capsule capsule 6/29/2016  Yes Yes    Take 2,000 Units by mouth Daily.    guaiFENesin (ROBITUSSIN) 100 MG/5ML solution oral solution 9/22/2016  Yes Yes    Take 200 mg by mouth Every 4 (Four) Hours As Needed (cold and sinus symptoms).    HYDROcodone-acetaminophen (NORCO) 5-325 MG per tablet 8/4/2017  Yes Yes    Take 1 tablet by mouth Every 6 (Six) Hours.    ipratropium-albuterol (DUO-NEB) 0.5-2.5 mg/mL nebulizer 9/22/2016  Yes Yes    Take 3 mL by nebulization Every 4 (Four) Hours As Needed for Shortness of Air.    LORazepam (ATIVAN) 0.5 MG tablet 12/10/2017  Yes Yes    Take 0.5 mg by mouth 2 (Two) Times a Day As Needed for Anxiety.    magnesium hydroxide (MILK OF MAGNESIA) 400 MG/5ML suspension 8/28/2017  Yes Yes    Take 30 mL by mouth Daily As Needed for Constipation (no BM in 3 days).    melatonin 1 MG tablet 8/14/2017  Yes Yes    Take 3 mg by mouth Every Night.    nystatin (nystatin) 934104 UNIT/GM powder 8/30/2017  Yes Yes    Apply 1 application topically Daily. Apply under breasts    sertraline (ZOLOFT) 50 MG tablet 8/31/2017  Yes Yes    Take 50 mg by mouth Daily.    traMADol (ULTRAM) 50 MG tablet 2/16/2017  Yes Yes    Take 50 mg by mouth 4 (Four) Times a Day As Needed for Moderate Pain  (increased pain).    travoprost, BAK free, (TRAVATAN) 0.004 % solution ophthalmic solution 8/30/2016  Yes Yes    1 drop Every Evening. in affected eye(s)             ED Medications:    Medications   amLODIPine (NORVASC) tablet 5 mg (5 mg Oral Not Given 3/9/18 0839)    HYDROcodone-acetaminophen (NORCO) 5-325 MG per tablet 1 tablet (not administered)   latanoprost (XALATAN) 0.005 % ophthalmic solution 1 drop (not administered)   sodium chloride 0.9 % flush 1-10 mL (not administered)   acetaminophen (TYLENOL) tablet 650 mg (not administered)   sodium chloride 0.9 % infusion (75 mL/hr Intravenous Currently Infusing 3/9/18 0444)   ondansetron (ZOFRAN) injection 4 mg (not administered)   morphine injection 2 mg (2 mg Intravenous Given 3/9/18 0838)     And   naloxone (NARCAN) injection 0.4 mg (not administered)   enoxaparin (LOVENOX) syringe 40 mg (not administered)   fentaNYL citrate (PF) (SUBLIMAZE) injection 50 mcg (50 mcg Intravenous Given 3/8/18 2011)   fentaNYL citrate (PF) (SUBLIMAZE) injection 50 mcg (50 mcg Intravenous Given 3/8/18 2050)   LORazepam (ATIVAN) injection 0.5 mg (0.5 mg Intravenous Given 3/8/18 2202)   LORazepam (ATIVAN) injection 0.5 mg (0.5 mg Intravenous Given 3/8/18 2222)       Vital Signs:    Temp:  [97.9 °F (36.6 °C)-100.3 °F (37.9 °C)] 99.5 °F (37.5 °C)  Heart Rate:  [] 103  Resp:  [18-22] 18  BP: (114-145)/() 145/79    Vitals:    03/09/18 0521 03/09/18 0800 03/09/18 0839 03/09/18 1159   BP: 130/78 140/80 140/80 145/79   BP Location: Right arm Right arm  Right arm   Patient Position: Lying Lying  Lying   Pulse: 86 106 107 103   Resp: 22 20 18   Temp: 97.9 °F (36.6 °C) 100.3 °F (37.9 °C)  99.5 °F (37.5 °C)   TempSrc: Axillary Axillary  Oral   SpO2:  98%  90%   Weight: 58 kg (127 lb 14.4 oz)      Height:           Last 2 weights    03/08/18  2316 03/09/18  0521   Weight: 58.3 kg (128 lb 8 oz) 58 kg (127 lb 14.4 oz)       Body mass index is 23.39 kg/(m^2).      Intake/Output Summary (Last 24 hours) at 03/09/18 1241  Last data filed at 03/09/18 0800   Gross per 24 hour   Intake                0 ml   Output              525 ml   Net             -525 ml       Physical Exam:    General Appearance:    Alert. Resting in bed in NAD. When asked questions  she just nods her head from side to side   Head:    Atraumatic and normocephalic   Eyes:             Extra-occular movements are intact.   Ears:    Ears appear intact with no abnormalities noted.   Throat:   Oral mucosa moist.   Neck:   Supple, trachea midline.   No cervical spine ttp    Lungs:     Breath sounds heard bilaterally.        Heart:    Regular rate and rhythm.   Abdomen:     Soft,  no guarding.   Extremities:   TTP with swelling to the left distal femur area. Knee immobilizer intact. Patient grimaces with palpation to this area.   No swelling to the left ankle. No bruising to the ankle   Pulses:   Pulses palpable and equal bilaterally   Skin:   No acute bleeding, bruising or rash   Lymph nodes:   No palpable adenopathy   Neurologic:   Motor and sensation intact.         Labs:    Lab Results (last 24 hours)     Procedure Component Value Units Date/Time    CBC & Differential [[id number]] Collected:  03/08/18 2017    Specimen:  Blood Updated:  03/08/18 2023    Narrative:       The following orders were created for panel order CBC & Differential.  Procedure                               Abnormality         Status                     ---------                               -----------         ------                     CBC Auto Differential[[id number]]         Abnormal            Final result                 Please view results for these tests on the individual orders.    CBC Auto Differential [[id number]]  (Abnormal) Collected:  03/08/18 2017    Specimen:  Blood Updated:  03/08/18 2023     WBC 16.07 (H) 10*3/mm3      RBC 3.92 (L) 10*6/mm3      Hemoglobin 12.5 g/dL      Hematocrit 36.9 (L) %      MCV 94.1 fL      MCH 31.9 (H) pg      MCHC 33.9 g/dL      RDW 14.7 (H) %      RDW-SD 50.9 fl      MPV 10.5 fL      Platelets 327 10*3/mm3      Neutrophil % 88.1 (H) %      Lymphocyte % 5.5 (L) %      Monocyte % 4.9 %      Eosinophil % 0.6 %      Basophil % 0.5 %      Immature Grans % 0.4 %      Neutrophils, Absolute 14.16  (H) 10*3/mm3      Lymphocytes, Absolute 0.88 10*3/mm3      Monocytes, Absolute 0.79 10*3/mm3      Eosinophils, Absolute 0.09 10*3/mm3      Basophils, Absolute 0.08 10*3/mm3      Immature Grans, Absolute 0.07 (H) 10*3/mm3      nRBC 0.0 /100 WBC     Comprehensive Metabolic Panel [[id number]]  (Abnormal) Collected:  03/08/18 2017    Specimen:  Blood Updated:  03/08/18 2047     Glucose 164 (H) mg/dL      BUN 14 mg/dL      Creatinine 0.60 mg/dL      Sodium 142 mmol/L      Potassium 3.5 mmol/L      Chloride 105 mmol/L      CO2 20.0 (L) mmol/L      Calcium 9.4 mg/dL      Total Protein 7.0 g/dL      Albumin 3.90 g/dL      ALT (SGPT) 29 U/L      AST (SGOT) 20 U/L      Alkaline Phosphatase 72 U/L      Total Bilirubin 1.3 mg/dL      eGFR Non African Amer 93 mL/min/1.73      Globulin 3.1 gm/dL      A/G Ratio 1.3 g/dL      BUN/Creatinine Ratio 23.3     Anion Gap 20.5 mmol/L     Narrative:       The MDRD GFR formula is only valid for adults with stable renal function between ages 18 and 70.    Troponin [[id number]]  (Normal) Collected:  03/08/18 2017    Specimen:  Blood Updated:  03/08/18 2047     Troponin I <0.012 ng/mL     Narrative:       Normal Patient Upper Reference Limit (URL) (99th Percentile)=0.03 ng/mL   Non-AMI Illness Reference Limit=0.03-0.11 ng/mL   AMI Confirmation=0.12 ng/mL and above    Protime-INR [[id number]]  (Abnormal) Collected:  03/08/18 2017    Specimen:  Blood Updated:  03/08/18 2054     Protime 14.1 (H) Seconds      INR 1.27 (H)    aPTT [[id number]]  (Abnormal) Collected:  03/08/18 2017    Specimen:  Blood Updated:  03/08/18 2054     PTT 24.7 (L) seconds     Urinalysis With / Culture If Indicated - Urine, Catheter [[id number]]  (Abnormal) Collected:  03/08/18 2223    Specimen:  Urine from Urine, Catheter Updated:  03/08/18 2233     Color, UA Yellow     Appearance, UA Turbid (A)     pH, UA 5.5     Specific Gravity, UA 1.025     Glucose, UA Negative     Ketones, UA 40 mg/dL (2+) (A)     Bilirubin, UA Small (1+) (A)      Blood, UA Negative     Protein, UA Trace (A)     Leuk Esterase, UA Negative     Nitrite, UA Negative     Urobilinogen, UA 1.0 E.U./dL    Narrative:       Urine microscopic not indicated.    VRE Culture - Swab, Per Rectum [[id number]] Collected:  03/08/18 2344    Specimen:  Swab from Per Rectum Updated:  03/09/18 0008    Acinetobacter Screen - Swab, Axilla, Left [[id number]] Collected:  03/08/18 2344    Specimen:  Swab from Axilla, Left Updated:  03/09/18 0008    MRSA Screen Culture - Swab, Nares [[id number]] Collected:  03/08/18 2344    Specimen:  Swab from Nares Updated:  03/09/18 0008    CBC Auto Differential [[id number]]  (Abnormal) Collected:  03/09/18 0552    Specimen:  Blood Updated:  03/09/18 0640     WBC 11.91 (H) 10*3/mm3      RBC 4.18 (L) 10*6/mm3      Hemoglobin 13.2 g/dL      Hematocrit 38.2 %      MCV 91.4 fL      MCH 31.6 (H) pg      MCHC 34.6 g/dL      RDW 14.6 (H) %      RDW-SD 48.5 fl      MPV 11.3 fL      Platelets 332 10*3/mm3      Neutrophil % 80.0 %      Lymphocyte % 10.7 %      Monocyte % 7.9 %      Eosinophil % 0.1 %      Basophil % 0.6 %      Immature Grans % 0.7 (H) %      Neutrophils, Absolute 9.53 (H) 10*3/mm3      Lymphocytes, Absolute 1.28 10*3/mm3      Monocytes, Absolute 0.94 (H) 10*3/mm3      Eosinophils, Absolute 0.01 10*3/mm3      Basophils, Absolute 0.07 10*3/mm3      Immature Grans, Absolute 0.08 (H) 10*3/mm3      nRBC 0.0 /100 WBC     Basic Metabolic Panel [[id number]]  (Abnormal) Collected:  03/09/18 0552    Specimen:  Blood Updated:  03/09/18 0700     Glucose 123 (H) mg/dL      BUN 12 mg/dL      Creatinine 0.50 (L) mg/dL      Sodium 146 (H) mmol/L      Potassium 3.9 mmol/L      Chloride 107 mmol/L      CO2 21.0 (L) mmol/L      Calcium 9.5 mg/dL      eGFR Non African Amer 115 mL/min/1.73      BUN/Creatinine Ratio 24.0 (H)     Anion Gap 21.9 mmol/L     Narrative:       The MDRD GFR formula is only valid for adults with stable renal function between ages 18 and 70.    Magnesium  [[id number]]  (Normal) Collected:  03/09/18 0552    Specimen:  Blood Updated:  03/09/18 0700     Magnesium 1.9 mg/dL     CK [[id number]]  (Normal) Collected:  03/09/18 0552    Specimen:  Blood Updated:  03/09/18 0700     Creatine Kinase 33 U/L     Narrative:       The relative CKMB index is statistically unreliable if the CK is < or equal to 40 U/L.    Troponin [[id number]]  (Normal) Collected:  03/09/18 0552    Specimen:  Blood Updated:  03/09/18 0738     Troponin I <0.012 ng/mL     Narrative:       Normal Patient Upper Reference Limit (URL) (99th Percentile)=0.03 ng/mL   Non-AMI Illness Reference Limit=0.03-0.11 ng/mL   AMI Confirmation=0.12 ng/mL and above          Radiology:    Imaging Results (last 72 hours)     Procedure Component Value Units Date/Time    XR Femur 2 View Left [[id number]] Collected:  03/09/18 0806     Updated:  03/09/18 0809    Narrative:       PROCEDURE: XR FEMUR 2 VW LEFT-     HISTORY: distal left femur fracture     COMPARISON: None.     FINDINGS:  A 2 view exam demonstrates a comminuted fracture of the  distal femoral metaphysis with posterior and lateral angulation.  The  patient is status post total left knee arthroplasty.  There are advanced  degenerative changes in the left hip.           Impression:       Comminuted left femoral fracture.                 This report was finalized on 3/9/2018 8:07 AM by Ross Bueno M.D..    XR Chest 1 View [[id number]] Collected:  03/09/18 0807     Updated:  03/09/18 0809    Narrative:       PROCEDURE: XR CHEST 1 VW-     HISTORY: pre op     COMPARISON: March 24, 2016.     FINDINGS: The heart is normal in size. The mediastinum is unremarkable.  The lungs are clear. There is no pneumothorax.  There are no acute  osseous abnormalities. There is an old right humeral fracture.       Impression:       No acute cardiopulmonary process.     Continued followup is recommended.     This report was finalized on 3/9/2018 8:07 AM by Ross Bueno M.D..           Assessment:    Principal Problem:    Closed fracture of left distal femur  Active Problems:    Status post fall    Osteoarthritis    Essential hypertension        Plan:   Left Closed displaced distal femur fracture:    Plan for left distal femur ORIF with plate vs. Retrograde nail placement    Risks, benefits, and alternative treatments discussed with the patient: [x] Yes [] No ( NO family is at bedside and no POA listed)    Risk benefits and merits of the proposed surgery were discussed and the patient's questions were answered risks discussed including and not limited to:  Anesthesia reactions  Blood loss and possible transfusion  Infection  Deep venous thrombosis and pulmonary embolus  Nerve, vascular or tendon injury  Fracture  Deformity  Stiffness  Weakness  Skin necrosis  Revision surgery or further treatment  Recurrence of problem and condition     Informed consent: [] Signed  [x] To be obtained at hospital  [] Both        Martin Wolfe PA-C  03/09/18  12:41 PM

## 2018-03-09 NOTE — ED NOTES
House supervisor called for bed assignment, stated that a nurse will have to be called in and she will call back with a bed assignment when the nurse arrives.      Rosario Woo  03/08/18 3282

## 2018-03-09 NOTE — ED PROVIDER NOTES
Subjective   HPI Comments: 89-year-old female presenting with leg injury.  Per report patient rolled out of bed one week ago onto her left side.  She is complaining of leg pain so an x-ray of her left hip was obtained and was reportedly negative.  Patient had been doing okay until the last couple days when she began moaning in pain constantly, they noted swelling to her distal left thigh.  They checked another x-ray today and noted a distal left femur fracture so she was sent here for evaluation.  She has no other complaints or concerns at this time.      Review of Systems   Constitutional: Negative for chills and fever.   HENT: Negative for congestion, rhinorrhea and sore throat.    Eyes: Negative for pain.   Respiratory: Negative for cough and shortness of breath.    Cardiovascular: Negative for chest pain, palpitations and leg swelling.   Gastrointestinal: Negative for abdominal pain, diarrhea, nausea and vomiting.   Genitourinary: Negative for dysuria.   Musculoskeletal: Positive for arthralgias. Negative for neck pain.   Skin: Negative for rash.   Neurological: Negative for weakness and numbness.   Psychiatric/Behavioral: Negative for behavioral problems.       Past Medical History:   Diagnosis Date   • Hypertension    • Hypertensive heart disease without CHF    • Hypoxemia    • Osteoarthritis    • Shoulder joint contracture, right        Allergies   Allergen Reactions   • Ampicillin Unknown (See Comments)     PT UNABLE TO RECALL REACTION     • Sulfa Antibiotics Unknown (See Comments)     PT UNABLE TO RECALL REACTION         Past Surgical History:   Procedure Laterality Date   • TOTAL KNEE ARTHROPLASTY Bilateral        History reviewed. No pertinent family history.    Social History     Social History   • Marital status:      Social History Main Topics   • Smoking status: Never Smoker   • Alcohol use No   • Drug use: No   • Sexual activity: No           Objective   Physical Exam   Constitutional: She is  oriented to person, place, and time. No distress.   Elderly, frail, obviously uncomfortable   HENT:   Head: Normocephalic and atraumatic.   Right Ear: External ear normal.   Left Ear: External ear normal.   Nose: Nose normal.   Mouth/Throat: Oropharynx is clear and moist.   Eyes: Conjunctivae and EOM are normal. Pupils are equal, round, and reactive to light.   Neck: Normal range of motion. Neck supple.   No midline tenderness, Full range of motion   Cardiovascular: Normal rate, regular rhythm, normal heart sounds and intact distal pulses.    2+ distal pulses   Pulmonary/Chest: Effort normal and breath sounds normal. No respiratory distress.   Abdominal: Soft. Bowel sounds are normal. She exhibits no distension. There is no tenderness. There is no rebound and no guarding.   Musculoskeletal: Normal range of motion.   Moderate swelling to the left distal thigh, diffuse tenderness, compartments are soft, all other extremities/joints stable without tenderness   Neurological: She is alert and oriented to person, place, and time.   Normal strength and sensation distal upper and lower extremities   Skin: Skin is warm and dry. No rash noted.   Psychiatric: She has a normal mood and affect. Her behavior is normal.   Nursing note and vitals reviewed.      Procedures         ED Course  ED Course                  MDM  Number of Diagnoses or Management Options  Other closed fracture of distal end of left femur, initial encounter:   Diagnosis management comments: 89-year-old female with femur fracture.  Elderly, frail female who is obviously uncomfortable in pain.  Her exam is otherwise notable for swelling and tenderness to her left distal thigh.  We'll recheck x-ray as we do not have access to this.  We'll treat pain, check labs.  Do not feel any other imaging is indicated at this time given the duration from her fall.  I suspect she will need admission.    DDX: Femur fracture, anemia, trauma    Labs notable for leukocytosis,  mild anemia.  X-ray read demonstrates comminuted, displaced/angulated fracture of the distal third of the left femur.  Discussed with Dr. Lyons, will place in knee immobilizer, admit to the hospitalist.  Discussed with Dr. Charles, will admit.       Amount and/or Complexity of Data Reviewed  Decide to obtain previous medical records or to obtain history from someone other than the patient: yes        Final diagnoses:   Other closed fracture of distal end of left femur, initial encounter            Miguel Fontenot MD  03/08/18 3053

## 2018-03-09 NOTE — ED NOTES
MEDICATION GIVEN IN RADIOLOGY, PT UNABLE TO TOLERATE PROCEDURE.      Sheridan Schultz, RN  03/08/18 0499

## 2018-03-09 NOTE — PLAN OF CARE
Problem: Patient Care Overview (Adult)  Goal: Plan of Care Review  Outcome: Ongoing (interventions implemented as appropriate)   03/09/18 1220   Coping/Psychosocial Response Interventions   Plan Of Care Reviewed With patient   Patient Care Overview   Progress improving      03/09/18 1220   Coping/Psychosocial Response Interventions   Plan Of Care Reviewed With patient   Patient Care Overview   Progress improving       Problem: Fall Risk (Adult)  Goal: Absence of Falls  Outcome: Ongoing (interventions implemented as appropriate)      Problem: Pain, Acute (Adult)  Goal: Acceptable Pain Control/Comfort Level  Outcome: Ongoing (interventions implemented as appropriate)      Problem: Infection, Risk/Actual (Adult)  Goal: Infection Prevention/Resolution  Outcome: Ongoing (interventions implemented as appropriate)      Problem: Skin Integrity Impairment, Risk/Actual (Adult)  Goal: Skin Integrity/Wound Healing  Outcome: Ongoing (interventions implemented as appropriate)

## 2018-03-09 NOTE — ED NOTES
I CONTACTED PTS DAUGHTER; SHE STATES SHE IS HER OWN POA UNLESS SHE BECOMES UNABLE TO MAKE HER OWN DECISIONS.      Sheridan Schultz, KRISH  03/08/18 3100

## 2018-03-09 NOTE — ANESTHESIA PREPROCEDURE EVALUATION
Anesthesia Evaluation     Patient summary reviewed and Nursing notes reviewed   no history of anesthetic complications:  NPO Solid Status: > 8 hours  NPO Liquid Status: > 8 hours           Airway   Mallampati: II  TM distance: >3 FB  Neck ROM: full  no difficulty expected  Dental - normal exam     Pulmonary - negative pulmonary ROS and normal exam   Cardiovascular - normal exam    Rhythm: regular  Rate: normal    (+) hypertension well controlled, CHF,       Neuro/Psych  (+) dementia, poor historian.,     GI/Hepatic/Renal/Endo - negative ROS     Musculoskeletal     Abdominal    Substance History - negative use     OB/GYN negative ob/gyn ROS         Other   (+) arthritis                     Anesthesia Plan    ASA 3 - emergent     general   (Risks and benefits discussed including risk of aspiration, recall and dental damage. All patient questions answered.    Patient told that a breathing tube will be used to manage the airway.    Will continue with plan of care.)  intravenous induction   Anesthetic plan and risks discussed with patient.

## 2018-03-09 NOTE — PROGRESS NOTES
Principal Problem:    Closed fracture of left distal femur  Active Problems:    Status post fall    Osteoarthritis    Essential hypertension          Subjective   She is lying in bed moaning I can get no other response from her her morphine analgesia has been increased.  I have been reports of chest pain or abdominal pain.      Vital Signs  Temp:  [97.9 °F (36.6 °C)-100.3 °F (37.9 °C)] 99.5 °F (37.5 °C)  Heart Rate:  [] 103  Resp:  [18-22] 18  BP: (114-145)/() 145/79    Physical Exam:   General Appearance:    Moaning    Head:    Normocephalic, without obvious abnormality, atraumatic       Throat:      Neck:     Back:        Lungs:     Clear to auscultation and percussion with normal chest expansion     Heart:   Regular rhythm without murmur or S3    Chest Wall:       Abdomen:        Extremities:   There is no lower extremity edema    Pulses:      Skin:   No skin breakdown was seen    Lymph nodes:      Neurologic:            Results Review:     I reviewed the patient's new clinical results.    Lab Results (last 24 hours)     Procedure Component Value Units Date/Time    CBC & Differential [[id number]] Collected:  03/08/18 2017    Specimen:  Blood Updated:  03/08/18 2023    Narrative:       The following orders were created for panel order CBC & Differential.  Procedure                               Abnormality         Status                     ---------                               -----------         ------                     CBC Auto Differential[[id number]]         Abnormal            Final result                 Please view results for these tests on the individual orders.    CBC Auto Differential [[id number]]  (Abnormal) Collected:  03/08/18 2017    Specimen:  Blood Updated:  03/08/18 2023     WBC 16.07 (H) 10*3/mm3      RBC 3.92 (L) 10*6/mm3      Hemoglobin 12.5 g/dL      Hematocrit 36.9 (L) %      MCV 94.1 fL      MCH 31.9 (H) pg      MCHC 33.9 g/dL      RDW 14.7 (H) %      RDW-SD 50.9 fl      MPV  10.5 fL      Platelets 327 10*3/mm3      Neutrophil % 88.1 (H) %      Lymphocyte % 5.5 (L) %      Monocyte % 4.9 %      Eosinophil % 0.6 %      Basophil % 0.5 %      Immature Grans % 0.4 %      Neutrophils, Absolute 14.16 (H) 10*3/mm3      Lymphocytes, Absolute 0.88 10*3/mm3      Monocytes, Absolute 0.79 10*3/mm3      Eosinophils, Absolute 0.09 10*3/mm3      Basophils, Absolute 0.08 10*3/mm3      Immature Grans, Absolute 0.07 (H) 10*3/mm3      nRBC 0.0 /100 WBC     Comprehensive Metabolic Panel [[id number]]  (Abnormal) Collected:  03/08/18 2017    Specimen:  Blood Updated:  03/08/18 2047     Glucose 164 (H) mg/dL      BUN 14 mg/dL      Creatinine 0.60 mg/dL      Sodium 142 mmol/L      Potassium 3.5 mmol/L      Chloride 105 mmol/L      CO2 20.0 (L) mmol/L      Calcium 9.4 mg/dL      Total Protein 7.0 g/dL      Albumin 3.90 g/dL      ALT (SGPT) 29 U/L      AST (SGOT) 20 U/L      Alkaline Phosphatase 72 U/L      Total Bilirubin 1.3 mg/dL      eGFR Non African Amer 93 mL/min/1.73      Globulin 3.1 gm/dL      A/G Ratio 1.3 g/dL      BUN/Creatinine Ratio 23.3     Anion Gap 20.5 mmol/L     Narrative:       The MDRD GFR formula is only valid for adults with stable renal function between ages 18 and 70.    Troponin [[id number]]  (Normal) Collected:  03/08/18 2017    Specimen:  Blood Updated:  03/08/18 2047     Troponin I <0.012 ng/mL     Narrative:       Normal Patient Upper Reference Limit (URL) (99th Percentile)=0.03 ng/mL   Non-AMI Illness Reference Limit=0.03-0.11 ng/mL   AMI Confirmation=0.12 ng/mL and above    Protime-INR [[id number]]  (Abnormal) Collected:  03/08/18 2017    Specimen:  Blood Updated:  03/08/18 2054     Protime 14.1 (H) Seconds      INR 1.27 (H)    aPTT [[id number]]  (Abnormal) Collected:  03/08/18 2017    Specimen:  Blood Updated:  03/08/18 2054     PTT 24.7 (L) seconds     Urinalysis With / Culture If Indicated - Urine, Catheter [[id number]]  (Abnormal) Collected:  03/08/18 2223    Specimen:  Urine from Urine,  Catheter Updated:  03/08/18 2233     Color, UA Yellow     Appearance, UA Turbid (A)     pH, UA 5.5     Specific Gravity, UA 1.025     Glucose, UA Negative     Ketones, UA 40 mg/dL (2+) (A)     Bilirubin, UA Small (1+) (A)     Blood, UA Negative     Protein, UA Trace (A)     Leuk Esterase, UA Negative     Nitrite, UA Negative     Urobilinogen, UA 1.0 E.U./dL    Narrative:       Urine microscopic not indicated.    VRE Culture - Swab, Per Rectum [[id number]] Collected:  03/08/18 2344    Specimen:  Swab from Per Rectum Updated:  03/09/18 0008    Acinetobacter Screen - Swab, Axilla, Left [[id number]] Collected:  03/08/18 2344    Specimen:  Swab from Axilla, Left Updated:  03/09/18 0008    MRSA Screen Culture - Swab, Nares [[id number]] Collected:  03/08/18 2344    Specimen:  Swab from Nares Updated:  03/09/18 0008    CBC Auto Differential [[id number]]  (Abnormal) Collected:  03/09/18 0552    Specimen:  Blood Updated:  03/09/18 0640     WBC 11.91 (H) 10*3/mm3      RBC 4.18 (L) 10*6/mm3      Hemoglobin 13.2 g/dL      Hematocrit 38.2 %      MCV 91.4 fL      MCH 31.6 (H) pg      MCHC 34.6 g/dL      RDW 14.6 (H) %      RDW-SD 48.5 fl      MPV 11.3 fL      Platelets 332 10*3/mm3      Neutrophil % 80.0 %      Lymphocyte % 10.7 %      Monocyte % 7.9 %      Eosinophil % 0.1 %      Basophil % 0.6 %      Immature Grans % 0.7 (H) %      Neutrophils, Absolute 9.53 (H) 10*3/mm3      Lymphocytes, Absolute 1.28 10*3/mm3      Monocytes, Absolute 0.94 (H) 10*3/mm3      Eosinophils, Absolute 0.01 10*3/mm3      Basophils, Absolute 0.07 10*3/mm3      Immature Grans, Absolute 0.08 (H) 10*3/mm3      nRBC 0.0 /100 WBC     Basic Metabolic Panel [[id number]]  (Abnormal) Collected:  03/09/18 0552    Specimen:  Blood Updated:  03/09/18 0700     Glucose 123 (H) mg/dL      BUN 12 mg/dL      Creatinine 0.50 (L) mg/dL      Sodium 146 (H) mmol/L      Potassium 3.9 mmol/L      Chloride 107 mmol/L      CO2 21.0 (L) mmol/L      Calcium 9.5 mg/dL      eGFR Non   Amer 115 mL/min/1.73      BUN/Creatinine Ratio 24.0 (H)     Anion Gap 21.9 mmol/L     Narrative:       The MDRD GFR formula is only valid for adults with stable renal function between ages 18 and 70.    Magnesium [[id number]]  (Normal) Collected:  03/09/18 0552    Specimen:  Blood Updated:  03/09/18 0700     Magnesium 1.9 mg/dL     CK [[id number]]  (Normal) Collected:  03/09/18 0552    Specimen:  Blood Updated:  03/09/18 0700     Creatine Kinase 33 U/L     Narrative:       The relative CKMB index is statistically unreliable if the CK is < or equal to 40 U/L.    Troponin [[id number]]  (Normal) Collected:  03/09/18 0552    Specimen:  Blood Updated:  03/09/18 0738     Troponin I <0.012 ng/mL     Narrative:       Normal Patient Upper Reference Limit (URL) (99th Percentile)=0.03 ng/mL   Non-AMI Illness Reference Limit=0.03-0.11 ng/mL   AMI Confirmation=0.12 ng/mL and above    CBC & Differential [[id number]] Collected:  03/09/18 1451    Specimen:  Blood Updated:  03/09/18 1506    Narrative:       The following orders were created for panel order CBC & Differential.  Procedure                               Abnormality         Status                     ---------                               -----------         ------                     CBC Auto Differential[[id number]]        Abnormal            Final result                 Please view results for these tests on the individual orders.    CBC Auto Differential [[id number]]  (Abnormal) Collected:  03/09/18 1451    Specimen:  Blood Updated:  03/09/18 1506     WBC 10.32 10*3/mm3      RBC 3.83 (L) 10*6/mm3      Hemoglobin 12.2 g/dL      Hematocrit 36.0 (L) %      MCV 94.0 fL      MCH 31.9 (H) pg      MCHC 33.9 g/dL      RDW 14.7 (H) %      RDW-SD 50.8 fl      MPV 10.2 fL      Platelets 336 10*3/mm3      Neutrophil % 77.4 %      Lymphocyte % 11.7 %      Monocyte % 9.1 %      Eosinophil % 0.6 %      Basophil % 0.7 %      Immature Grans % 0.5 %      Neutrophils, Absolute 7.99  (H) 10*3/mm3      Lymphocytes, Absolute 1.21 10*3/mm3      Monocytes, Absolute 0.94 (H) 10*3/mm3      Eosinophils, Absolute 0.06 10*3/mm3      Basophils, Absolute 0.07 10*3/mm3      Immature Grans, Absolute 0.05 10*3/mm3      nRBC 0.0 /100 WBC     Urine Culture - Urine, Urine, Clean Catch [[id number]] Collected:  03/09/18 1440    Specimen:  Urine from Urine, Clean Catch Updated:  03/09/18 1517    Urinalysis With / Culture If Indicated - [[id number]]  (Abnormal) Collected:  03/09/18 1440    Specimen:  Urine Updated:  03/09/18 1518     Color, UA Yellow     Appearance, UA Cloudy (A)     pH, UA 7.0     Specific Gravity, UA 1.020     Glucose, UA Negative     Ketones, UA >=160 mg/dL (4+) (A)     Bilirubin, UA Small (1+) (A)     Blood, UA Negative     Protein, UA Trace (A)     Leuk Esterase, UA Negative     Nitrite, UA Negative     Urobilinogen, UA 4.0 E.U./dL (A)    Urinalysis, Microscopic Only - Urine, Clean Catch [[id number]]  (Abnormal) Collected:  03/09/18 1440    Specimen:  Urine from Urine, Clean Catch Updated:  03/09/18 1518     RBC, UA 0-2 (A) /HPF      WBC, UA 3-5 (A) /HPF      Bacteria, UA 3+ (A) /HPF      Squamous Epithelial Cells, UA 3-6 (A) /HPF      Hyaline Casts, UA None Seen /LPF      Methodology Manual Light Microscopy    Comprehensive Metabolic Panel [[id number]]  (Abnormal) Collected:  03/09/18 1451    Specimen:  Blood Updated:  03/09/18 1520     Glucose 117 (H) mg/dL      BUN 9 mg/dL      Creatinine 0.50 (L) mg/dL      Sodium 144 mmol/L      Potassium 3.4 (L) mmol/L      Chloride 109 (H) mmol/L      CO2 21.0 (L) mmol/L      Calcium 8.8 mg/dL      Total Protein 6.5 g/dL      Albumin 3.70 g/dL      ALT (SGPT) 30 U/L      AST (SGOT) 20 U/L      Alkaline Phosphatase 66 U/L      Total Bilirubin 1.6 (H) mg/dL      eGFR Non African Amer 115 mL/min/1.73      Globulin 2.8 gm/dL      A/G Ratio 1.3 g/dL      BUN/Creatinine Ratio 18.0     Anion Gap 17.4 mmol/L     Narrative:       The MDRD GFR formula is only valid  for adults with stable renal function between ages 18 and 70.    Protime-INR [[id number]]  (Abnormal) Collected:  03/09/18 1451    Specimen:  Blood Updated:  03/09/18 1533     Protime 14.6 (H) Seconds      INR 1.31 (H)    aPTT [[id number]]  (Normal) Collected:  03/09/18 1451    Specimen:  Blood Updated:  03/09/18 1533     PTT 25.3 seconds         Imaging Results (last 24 hours)     Procedure Component Value Units Date/Time    XR Femur 2 View Left [[id number]] Collected:  03/09/18 0806     Updated:  03/09/18 0809    Narrative:       PROCEDURE: XR FEMUR 2 VW LEFT-     HISTORY: distal left femur fracture     COMPARISON: None.     FINDINGS:  A 2 view exam demonstrates a comminuted fracture of the  distal femoral metaphysis with posterior and lateral angulation.  The  patient is status post total left knee arthroplasty.  There are advanced  degenerative changes in the left hip.           Impression:       Comminuted left femoral fracture.                 This report was finalized on 3/9/2018 8:07 AM by Ross Bueno M.D..    XR Chest 1 View [[id number]] Collected:  03/09/18 0807     Updated:  03/09/18 0809    Narrative:       PROCEDURE: XR CHEST 1 VW-     HISTORY: pre op     COMPARISON: March 24, 2016.     FINDINGS: The heart is normal in size. The mediastinum is unremarkable.  The lungs are clear. There is no pneumothorax.  There are no acute  osseous abnormalities. There is an old right humeral fracture.       Impression:       No acute cardiopulmonary process.     Continued followup is recommended.     This report was finalized on 3/9/2018 8:07 AM by Ross uBeno M.D..    XR Chest 1 View [[id number]] Collected:  03/09/18 1521     Updated:  03/09/18 1524    Narrative:       PROCEDURE: XR CHEST 1 VW-     HISTORY: PREOPERATION FOR NON-CORONARY CARDIAC SURGERY     COMPARISON: 3/8/2018.     FINDINGS: The heart is normal in size. The mediastinum is unremarkable.  There are low lung volumes with bibasilar atelectasis.  There is no  pneumothorax.  There are no acute osseous abnormalities. There is an old  right humeral fracture. There are advanced degenerative changes in the  left shoulder.       Impression:       No acute cardiopulmonary process.     Continued followup is recommended.     This report was finalized on 3/9/2018 3:22 PM by Ross Bueno M.D..            Abilio Garcia MD  03/09/18  4:06 PM

## 2018-03-09 NOTE — H&P
[facility]   HISTORY AND PHYSICAL      Name:  Stefania Kirkpatrick   Age:  89 y.o.  Sex:  female  :  1924  MRN:  [id number]   Visit Number:  [id number]  Admission Date:  3/8/2018  Date Of Service:  18  Primary Care Physician:  Abilio Garcia MD    Chief Complaint:     Left leg pain since fall one week ago.    History Of Presenting Illness:      This is a 89-year-old female with history of hypertension, osteoarthritis was sent from the nursing home facility with the above complaints.  The history is obtained from the patient's medical chart.  Apparently, patient sustained a fall at the nursing home facility about a week ago when she fell off her bed.  At that time she had left hip x-ray done which did not show any fractures.  When the patient continued to have left leg pain and was not able to ambulate and was subsequently sent to the emergency room when they noticed distal femur fracture on the repeat x-ray.    Patient was evaluated in the emergency room.  She was hemodynamically stable.  Blood work including troponin, CMP, PT/INR and CBC were within normal limits except for a white count of 16,000.  Urinalysis is currently pending.  Chest x-ray did not show any infiltrates.  Left femur x-ray confirmed the distal displaced femur fracture.  A call was placed to Dr. Lyons from orthopedic service and he was agreeable for consultation.  Patient received 2 doses of fentanyl for pain in the emergency room.  According to the ER physician, the patient became agitated and he gave her 0.5 of Ativan in the emergency room.  She is currently being admitted to the medical floor with telemetry for further evaluation and management.    Patient is currently lying down on the bed and is in mild distress because of her pain.  Unfortunately due to her dementia I am unable to get any history from her.  She does move all 4 limbs equally.    Review Of Systems:     As per history of present  illness.  I am unable to obtain complete review of systems due to her confusion.     Past Medical History:    Past Medical History:   Diagnosis Date   • Hypertension    • Hypertensive heart disease without CHF    • Hypoxemia    • Osteoarthritis    • Shoulder joint contracture, right        Past Surgical history:    Past Surgical History:   Procedure Laterality Date   • TOTAL KNEE ARTHROPLASTY Bilateral        Social History:    Social History     Social History   • Marital status:      Spouse name: N/A   • Number of children: N/A   • Years of education: N/A     Occupational History   • Not on file.     Social History Main Topics   • Smoking status: Never Smoker   • Smokeless tobacco: Not on file   • Alcohol use No   • Drug use: No   • Sexual activity: No     Other Topics Concern   • Not on file     Social History Narrative   • No narrative on file     Family History:    History reviewed. No pertinent family history.    Allergies:      Ampicillin and Sulfa antibiotics    Home Medications:    Prior to Admission Medications     None             ED Medications:    Medications   LORazepam (ATIVAN) injection 0.5 mg (not administered)   LORazepam (ATIVAN) 2 MG/ML injection  - ADS Override Pull (not administered)   fentaNYL citrate (PF) (SUBLIMAZE) injection 50 mcg (50 mcg Intravenous Given 3/8/18 2011)   fentaNYL citrate (PF) (SUBLIMAZE) injection 50 mcg (50 mcg Intravenous Given 3/8/18 2050)     Vital Signs:    Temp:  [98 °F (36.7 °C)] 98 °F (36.7 °C)  Heart Rate:  [92] 92  Resp:  [20] 20  BP: (121)/(79) 121/79    Last 3 weights    03/08/18 1943   Weight: 63.5 kg (140 lb)       Body mass index is 31.39 kg/(m^2).    Physical Exam:    General Appearance:  Alert but agitated, in mild distress due to the pain.   Head:  Atraumatic and normocephalic, without obvious abnormality.   Eyes:          PERRLA, conjunctivae and sclerae normal, no Icterus. No pallor. Extra-occular movements are within normal limits.   Ears:   Ears appear intact with no abnormalities noted.   Throat: No oral lesions, no thrush, oral mucosa moist.   Neck: Supple, trachea midline, no thyromegaly, no carotid bruit.   Back:   No kyphoscoliosis present. No tenderness to palpation,   range of motion normal.   Lungs:   Chest shape is normal. Breath sounds heard bilaterally equally.  No crackles or wheezing. No Pleural rub or bronchial breathing.   Heart:  Normal S1 and S2, no murmur, no gallop, no rub. No JVD.   Abdomen:   Normal bowel sounds, no masses, no organomegaly. Soft, non-tender, non-distended, no guarding, no rebound tenderness.   Extremities: Moves all extremities well, no edema, no cyanosis, no clubbing.  Left knee immobilizer in place.   Pulses: Pulses palpable and equal bilaterally.   Skin: No bleeding, bruising or rash.   Neurologic: Alert but confused. Moves all four limbs equally. No tremors. No facial asymetry.     Laboratory data:    I have reviewed the labs done in the emergency room.      Results from last 7 days  Lab Units 03/08/18 2017   SODIUM mmol/L 142   POTASSIUM mmol/L 3.5   CHLORIDE mmol/L 105   CO2 mmol/L 20.0*   BUN mg/dL 14   CREATININE mg/dL 0.60   CALCIUM mg/dL 9.4   BILIRUBIN mg/dL 1.3   ALK PHOS U/L 72   ALT (SGPT) U/L 29   AST (SGOT) U/L 20   GLUCOSE mg/dL 164*       Results from last 7 days  Lab Units 03/08/18 2017   WBC 10*3/mm3 16.07*   HEMOGLOBIN g/dL 12.5   HEMATOCRIT % 36.9*   PLATELETS 10*3/mm3 327       Results from last 7 days  Lab Units 03/08/18 2017   INR  1.27*       Results from last 7 days  Lab Units 03/08/18 2017   TROPONIN I ng/mL <0.012     EKG:      EKG done in the emergency room was reviewed by me.  It shows sinus rhythm with a rate of 89 bpm.  Left axis deviation noted with right bundle branch block and nonspecific ST-T changes.  Occasional PVCs noted.    Radiology:    Imaging Results (last 72 hours)     Procedure Component Value Units Date/Time    XR Femur 2 View Left [[id number]] Updated:  03/08/18  2131    XR Chest 1 View [[id number]] Updated:  03/08/18 2131        I reviewed the left femur x-rays done in the emergency room.  It shows distal left femur comminuted and displaced fracture.  She has left knee total arthroplasty which is old as well as a right hip arthroplasty from the past.     I reviewed the portable chest x-ray done in the emergency room.  Unfortunately, it is a rotated film.  No significant infiltrates noted on the lung fields.  Calcified aorta noted.  She does seem to have chronic appearing right humeral head fracture.    Active Problems:    Closed fracture of left distal femur    Assessment:    1.  Status post fall, prior to admission.  2.  Left closed distal comminuted femur fracture, secondary to #1.  3.  Essential hypertension.  4.  Osteoarthritis.    Plan:    Patient is currently being admitted to the medical floor with telemetry.  She will be placed on gentle IV hydration and we will keep her nothing by mouth after midnight.  I will place her on morphine as needed for pain control.  She will be placed on continuous pulse oximetry.  We will consult Dr. Lyons and I have discussed the patient's condition with him.  Her initial troponin level is within normal limits.  She does not have any history of coronary artery disease.  Her last echocardiogram done in March 2016 showed normal left ventricular ejection fraction, normal left ventricular diastolic function and no significant valvular disease.  She does not need any further cardiac evaluation at this time but would be at moderate risk due to her advanced age.  She will be placed on Lovenox for DVT prophylaxis after her surgery tomorrow.    Levy Charles MD  03/08/18  9:55 PM    Dictated utilizing Dragon dictation.

## 2018-03-09 NOTE — PLAN OF CARE
Problem: Perioperative Period (Adult)  Goal: Signs and Symptoms of Listed Potential Problems Will be Absent or Manageable (Perioperative Period)   03/09/18 5679   Perioperative Period   Problems Assessed (Perioperative Period) all

## 2018-03-09 NOTE — ANESTHESIA PROCEDURE NOTES
Airway  Urgency: elective    Airway not difficult    General Information and Staff    Patient location during procedure: OR  CRNA: GEOVANNA ORTIZ    Indications and Patient Condition  Indications for airway management: airway protection    Preoxygenated: yes  Mask difficulty assessment: 1 - vent by mask    Final Airway Details  Final airway type: endotracheal airway      Successful airway: ETT  Cuffed: yes   Successful intubation technique: direct laryngoscopy  Facilitating devices/methods: intubating stylet  Endotracheal tube insertion site: oral  Blade: Gilbert  Blade size: #4  ETT size: 7.5 mm  Cormack-Lehane Classification: grade I - full view of glottis  Placement verified by: chest auscultation and capnometry   Measured from: lips  ETT to lips (cm): 20  Number of attempts at approach: 1    Additional Comments  Dentition and Lips as preoperative assessment. Airway placed without complication. ETT cuff inflated to minimal occlusive pressure.

## 2018-03-09 NOTE — PLAN OF CARE
Problem: Patient Care Overview (Adult)  Goal: Plan of Care Review  Outcome: Ongoing (interventions implemented as appropriate)   03/09/18 0454   Coping/Psychosocial Response Interventions   Plan Of Care Reviewed With patient   Patient Care Overview   Progress no change   Outcome Evaluation   Outcome Summary/Follow up Plan Pt has been in a tremendous amount of pain, and pain control has been difficult to achieve.      Goal: Discharge Needs Assessment  Outcome: Ongoing (interventions implemented as appropriate)      Problem: Fall Risk (Adult)  Goal: Identify Related Risk Factors and Signs and Symptoms  Outcome: Ongoing (interventions implemented as appropriate)    Goal: Absence of Falls  Outcome: Ongoing (interventions implemented as appropriate)      Problem: Pain, Acute (Adult)  Goal: Identify Related Risk Factors and Signs and Symptoms  Outcome: Ongoing (interventions implemented as appropriate)    Goal: Acceptable Pain Control/Comfort Level  Outcome: Ongoing (interventions implemented as appropriate)      Problem: Infection, Risk/Actual (Adult)  Goal: Identify Related Risk Factors and Signs and Symptoms  Outcome: Ongoing (interventions implemented as appropriate)    Goal: Infection Prevention/Resolution  Outcome: Ongoing (interventions implemented as appropriate)

## 2018-03-09 NOTE — ED NOTES
PT IS NOW SLIGHTLY COMBATIVE AND CONFUSED. PT PULLING MONITORS OFF, ATTEMPTING TO PULL OUT IV.      Sheridan Schultz, RN  03/08/18 7962

## 2018-03-09 NOTE — PROGRESS NOTES
Discharge Planning Assessment  [facility]     Patient Name: Stefania Kirkpatrick  MRN: [id number]  Today's Date: 3/9/2018    Admit Date: 3/8/2018          Discharge Needs Assessment       03/09/18 1619    Living Environment    Lives With facility resident    Living Arrangements other (see comments)   LTC at [facility]    Home Accessibility no concerns    Stair Railings at Home none    Type of Financial/Environmental Concern none    Transportation Available ambulance    Living Environment    Provides Primary Care For no one    Primary Care Provided [facility] other (see comments)   [facility]    Quality Of Family Relationships supportive    Able to Return to Prior Living Arrangements yes    Discharge Needs Assessment    Concerns To Be Addressed no discharge needs identified    Readmission Within The Last 30 Days no previous admission in last 30 days    Anticipated Changes Related to Illness none    Equipment Currently Used at Home hospital bed;wheelchair    Equipment Needed After Discharge none            Discharge Plan       03/09/18 1621    Case Management/Social Work Plan    Plan LTC resident at [facility]    Patient/Family In Agreement With Plan yes    Additional Comments Pt unable to communicate d/t confusion.  Called daughter, Alis Kirkpatrick.  Pt is a LTC resident at [facility] and she plans for pt to return.  Alis reports that pt is mainly bed bound, but can sit in a WC at times.  Pt requires an ambulance transport.  Address, phone & PCP verified and corrected as needed.  CM will continue to follow and assist with discharge as needed.      Called Esther with [facility].  Esther verified that pt was a LTC resident at their facility and she may return when medically ready.          Discharge Placement     No information found        Expected Discharge Date and Time     Expected Discharge Date Expected Discharge Time    Mar 12, 2018               Demographic Summary       03/09/18 1618    Referral Information     Admission Type inpatient    Arrived From [facility] H/R    Referral Source admission list    Reason For Consult discharge planning    Record Reviewed history and physical;medical record    Primary Care Physician Information    Name Abilio Garcia MD            Functional Status       03/09/18 2227    Functional Status Prior    Ambulation 4-->completely dependent    Transferring 4-->completely dependent    Toileting 4-->completely dependent    Bathing 4-->completely dependent    Dressing 4-->completely dependent    Eating 4-->completely dependent    Communication 2-->difficulty understanding and speaking (not related to language barrier)    IADL    Medications completely dependent    Meal Preparation completely dependent    Housekeeping completely dependent    Laundry completely dependent    Shopping completely dependent    Oral Care completely dependent            Psychosocial     None            Abuse/Neglect     None            Legal     None            Substance Abuse     None            Patient Forms     None          Alis Conway

## 2018-03-10 LAB
ANION GAP SERPL CALCULATED.3IONS-SCNC: 19.9 MMOL/L
BASOPHILS # BLD AUTO: 0.01 10*3/MM3 (ref 0–0.2)
BASOPHILS NFR BLD AUTO: 0.1 % (ref 0–2.5)
BUN BLD-MCNC: 9 MG/DL (ref 7–20)
BUN/CREAT SERPL: 18 (ref 7.1–23.5)
CALCIUM SPEC-SCNC: 8.1 MG/DL (ref 8.4–10.2)
CHLORIDE SERPL-SCNC: 113 MMOL/L (ref 98–107)
CO2 SERPL-SCNC: 19 MMOL/L (ref 26–30)
CREAT BLD-MCNC: 0.5 MG/DL (ref 0.6–1.3)
DEPRECATED RDW RBC AUTO: 53.6 FL (ref 37–54)
EOSINOPHIL # BLD AUTO: 0 10*3/MM3 (ref 0–0.7)
EOSINOPHIL NFR BLD AUTO: 0 % (ref 0–7)
ERYTHROCYTE [DISTWIDTH] IN BLOOD BY AUTOMATED COUNT: 15 % (ref 11.5–14.5)
GFR SERPL CREATININE-BSD FRML MDRD: 115 ML/MIN/1.73
GLUCOSE BLD-MCNC: 165 MG/DL (ref 74–98)
HCT VFR BLD AUTO: 31.8 % (ref 37–47)
HGB BLD-MCNC: 10.4 G/DL (ref 12–16)
IMM GRANULOCYTES # BLD: 0.09 10*3/MM3 (ref 0–0.06)
IMM GRANULOCYTES NFR BLD: 1 % (ref 0–0.6)
LYMPHOCYTES # BLD AUTO: 0.6 10*3/MM3 (ref 0.6–3.4)
LYMPHOCYTES NFR BLD AUTO: 6.5 % (ref 10–50)
MCH RBC QN AUTO: 31.8 PG (ref 27–31)
MCHC RBC AUTO-ENTMCNC: 32.7 G/DL (ref 30–37)
MCV RBC AUTO: 97.2 FL (ref 81–99)
MONOCYTES # BLD AUTO: 0.56 10*3/MM3 (ref 0–0.9)
MONOCYTES NFR BLD AUTO: 6 % (ref 0–12)
NEUTROPHILS # BLD AUTO: 8.03 10*3/MM3 (ref 2–6.9)
NEUTROPHILS NFR BLD AUTO: 86.4 % (ref 37–80)
NRBC BLD MANUAL-RTO: 0 /100 WBC (ref 0–0)
PLATELET # BLD AUTO: 279 10*3/MM3 (ref 130–400)
PMV BLD AUTO: 10.4 FL (ref 6–12)
POTASSIUM BLD-SCNC: 3.9 MMOL/L (ref 3.5–5.1)
RBC # BLD AUTO: 3.27 10*6/MM3 (ref 4.2–5.4)
SODIUM BLD-SCNC: 148 MMOL/L (ref 137–145)
VRE SPEC QL CULT: NORMAL
WBC NRBC COR # BLD: 9.29 10*3/MM3 (ref 4.8–10.8)

## 2018-03-10 PROCEDURE — 85025 COMPLETE CBC W/AUTO DIFF WBC: CPT | Performed by: ORTHOPAEDIC SURGERY

## 2018-03-10 PROCEDURE — 94799 UNLISTED PULMONARY SVC/PX: CPT

## 2018-03-10 PROCEDURE — 80048 BASIC METABOLIC PNL TOTAL CA: CPT | Performed by: ORTHOPAEDIC SURGERY

## 2018-03-10 PROCEDURE — 25010000002 ENOXAPARIN PER 10 MG: Performed by: ORTHOPAEDIC SURGERY

## 2018-03-10 PROCEDURE — 25010000002 HYDROMORPHONE PER 4 MG: Performed by: INTERNAL MEDICINE

## 2018-03-10 PROCEDURE — 97162 PT EVAL MOD COMPLEX 30 MIN: CPT

## 2018-03-10 PROCEDURE — 25010000002 MORPHINE PER 10 MG: Performed by: INTERNAL MEDICINE

## 2018-03-10 PROCEDURE — 99232 SBSQ HOSP IP/OBS MODERATE 35: CPT | Performed by: INTERNAL MEDICINE

## 2018-03-10 RX ADMIN — SODIUM CHLORIDE 250 ML: 9 INJECTION, SOLUTION INTRAVENOUS at 23:00

## 2018-03-10 RX ADMIN — HYDROMORPHONE HYDROCHLORIDE 0.5 MG: 10 INJECTION, SOLUTION INTRAMUSCULAR; INTRAVENOUS; SUBCUTANEOUS at 21:02

## 2018-03-10 RX ADMIN — HYDROCODONE BITARTRATE AND ACETAMINOPHEN 1 TABLET: 5; 325 TABLET ORAL at 10:24

## 2018-03-10 RX ADMIN — CLINDAMYCIN PHOSPHATE 600 MG: 600 INJECTION, SOLUTION INTRAVENOUS at 03:00

## 2018-03-10 RX ADMIN — LATANOPROST 1 DROP: 50 SOLUTION OPHTHALMIC at 21:08

## 2018-03-10 RX ADMIN — SODIUM CHLORIDE 250 ML: 9 INJECTION, SOLUTION INTRAVENOUS at 17:33

## 2018-03-10 RX ADMIN — MORPHINE SULFATE 2 MG: 2 INJECTION, SOLUTION INTRAMUSCULAR; INTRAVENOUS at 06:00

## 2018-03-10 RX ADMIN — MELATONIN TAB 3 MG 3 MG: 3 TAB at 21:03

## 2018-03-10 RX ADMIN — Medication 10 ML: at 14:00

## 2018-03-10 RX ADMIN — CLINDAMYCIN PHOSPHATE 600 MG: 600 INJECTION, SOLUTION INTRAVENOUS at 10:24

## 2018-03-10 RX ADMIN — ENOXAPARIN SODIUM 40 MG: 40 INJECTION SUBCUTANEOUS at 09:54

## 2018-03-10 RX ADMIN — SODIUM CHLORIDE 250 ML: 9 INJECTION, SOLUTION INTRAVENOUS at 23:50

## 2018-03-10 RX ADMIN — AMLODIPINE BESYLATE 5 MG: 5 TABLET ORAL at 09:54

## 2018-03-10 NOTE — PROGRESS NOTES
"Patient: Stefania Kirkpatrick  Procedure(s):  FEMUR LEFT DISTAL PERIPROSTHETIC FRACTURE INTRAMEDULLARY NAILING RETROGRADE  Anesthesia type: [unfilled]    Patient location: [facility] Surgical Floor  Last vitals: /71 (BP Location: Right arm, Patient Position: Lying)   Pulse 88   Temp 98 °F (36.7 °C) (Axillary)   Resp 20   Ht 157.5 cm (62\")   Wt 58.4 kg (128 lb 11.2 oz)   SpO2 96%   BMI 23.54 kg/m²   Level of consciousness: awake, alert and oriented    Post-anesthesia pain: adequate analgesia  Airway patency: patent  Respiratory: unassisted  Cardiovascular: stable and blood pressure at baseline  Hydration: euvolemic    Anesthetic complications: no  "

## 2018-03-10 NOTE — THERAPY EVALUATION
Acute Care - Physical Therapy Initial Evaluation   Chris     Patient Name: Stefania Kirkpatrick  : 1924  MRN: [id number]  Today's Date: 3/10/2018   Onset of Illness/Injury or Date of Surgery: 18  Date of Referral to PT: 18  Referring Physician: Man Lyons MD      Admit Date: 3/8/2018    Visit Dx:     ICD-10-CM ICD-9-CM   1. Other closed fracture of distal end of left femur, initial encounter S72.492A 821.29   2. Status post fall Z91.81 V15.88   3. Impaired functional mobility, balance, gait, and endurance Z74.09 V49.89     Patient Active Problem List   Diagnosis   • Closed fracture of left distal femur   • Status post fall   • Osteoarthritis   • Essential hypertension     Past Medical History:   Diagnosis Date   • Hypertension    • Hypertensive heart disease without CHF    • Hypoxemia    • Osteoarthritis    • Shoulder joint contracture, right      Past Surgical History:   Procedure Laterality Date   • JOINT REPLACEMENT     • TOTAL KNEE ARTHROPLASTY Bilateral         PT ASSESSMENT (last 72 hours)      Physical Therapy Evaluation     Row Name 03/10/18 1146          PT Evaluation Time/Intention    Subjective Information complains of;pain  -JR     Document Type evaluation  -JR     Mode of Treatment individual therapy  -JR     Patient Effort adequate  -JR     Symptoms Noted During/After Treatment increased pain  -JR     Comment Fear of falling  -JR     Row Name 03/10/18 1146          General Information    Patient Profile Reviewed? yes  -JR     Onset of Illness/Injury or Date of Surgery 18  -JR     Referring Physician Man Lyons MD  -JR     Patient Observations alert  -JR     Patient/Family Observations No visitors at bedside  -JR     General Observations of Patient Patient is supine with IV in LUE, 3.5LPM oxygen per nasal cannula, L knee immobilized  -JR     Prior Level of Function --   Primarily bed bound at [facility], can sit in wheelchair.  -JR     Pertinent History of Current  Functional Problem Fall with resulting left distal femur fx  -JR     Existing Precautions/Restrictions --   Left knee immobilizer worn at all times  -JR     Limitations/Impairments safety/cognitive  -JR     Risks Reviewed patient:;increased discomfort  -JR     Benefits Reviewed patient:;increase independence;increase strength;increase balance;improve function  -JR     Barriers to Rehab previous functional deficit;cognitive status  -JR     Row Name 03/10/18 1146          Relationship/Environment    Primary Source of Support/Comfort nonrelative caregiver  -JR     Lives With facility resident  -JR     Name(s) of Who Lives With Patient Basia Huertas resident  -JR     Row Name 03/10/18 1146          Resource/Environmental Concerns    Current Living Arrangements extended care facility  -JR     Resource/Environmental Concerns none  -JR     Row Name 03/10/18 1146          Cognitive Assessment/Intervention- PT/OT    Orientation Status (Cognition) disoriented to;person;place;time;situation  -JR     Follows Commands (Cognition) does not follow one step commands;physical/tactile prompts required  -JR     Safety Deficit (Cognitive) severe deficit  -JR     Cognitive Assessment/Intervention Comment Bed alarm for safety  -JR     Row Name 03/10/18 1146          Safety Issues, Functional Mobility    Safety Issues Affecting Function (Mobility) ability to follow commands;safety precaution awareness  -JR     Impairments Affecting Function (Mobility) pain;cognition;strength;balance  -JR     Row Name 03/10/18 1146          Mobility Assessment/Treatment    Extremity Weight-bearing Status left lower extremity  -JR     Left Lower Extremity (Weight-bearing Status) weight-bearing as tolerated (WBAT)  -JR     Row Name 03/10/18 1146          Bed Mobility Assessment/Treatment    Bed Mobility Assessment/Treatment rolling left;rolling right;supine-sit-supine  -JR     Rolling Left [place] (Bed Mobility) minimum assist (75% patient effort)  -JR      Rolling Right [place] (Bed Mobility) minimum assist (75% patient effort)  -JR     Supine-Sit-Supine [place] (Bed Mobility) moderate assist (50% patient effort);2 person assist  -JR     Bed Mobility, Safety Issues decreased use of arms for pushing/pulling;decreased use of legs for bridging/pushing;impaired trunk control for bed mobility  -JR     Assistive Device (Bed Mobility) bed rails;draw sheet;head of bed elevated  -JR     Comment (Bed Mobility) Sat EOB x 3 minutes with CGA-minimal assist  -JR     Row Name 03/10/18 1146          Transfer Assessment/Treatment    Comment (Transfers) Unable at time of eval  -JR     Row Name 03/10/18 1146          Gait/Stairs Assessment/Training    Comment (Gait/Stairs) Unable at time of evaluation  -     Row Name 03/10/18 1146          General ROM    LT Lower Ext Lt Knee Extension/Flexion  -     GENERAL ROM COMMENTS Immobilizer at all times  -     Row Name 03/10/18 1146          General Assessment (Manual Muscle Testing)    Comment, General Manual Muscle Testing (MMT) Assessment LLE=2/5, RLE=3-/5  -     Row Name 03/10/18 1146          Pain Assessment    Additional Documentation --   unable to rate or qualify pain  -     Row Name 03/10/18 1146          Plan of Care Review    Plan of Care Reviewed With patient  -     Row Name 03/10/18 1146          Physical Therapy Clinical Impression    Date of Referral to PT 03/09/18  -JR     PT Diagnosis (PT Clinical Impression) LLE weakness with knee immobilized, poor balance  -JR     Patient/Family Goals Statement (PT Clinical Impression) Patient states she wants to lay down  -JR     Criteria for Skilled Interventions Met (PT Clinical Impression) yes;treatment indicated  -JR     Pathology/Pathophysiology Noted (Describe Specifically for Each System) musculoskeletal;neuromuscular;pulmonary;cardiovascular  -JR     Impairments Found (describe specific impairments) gait, locomotion, and balance;arousal, attention, and  cognition;joint integrity and mobility;muscle performance;aerobic capacity/endurance  -     Rehab Potential (PT Clinical Summary) fair, will monitor progress closely  -     Care Plan Review (PT) evaluation/treatment results reviewed  -     Row Name 03/10/18 1146          Physical Therapy Goals    Bed Mobility Goal Selection (PT) bed mobility, PT goal 1  -JR     Transfer Goal Selection (PT) transfer, PT goal 1  -     Row Name 03/10/18 1146          Bed Mobility Goal 1 (PT)    Activity/Assistive Device (Bed Mobility Goal 1, PT) sit to supine/supine to sit  -JR     [place] Level/Cues Needed (Bed Mobility Goal 1, PT) minimum assist (75% or more patient effort)  -JR     Time Frame (Bed Mobility Goal 1, PT) 2 weeks  -     Row Name 03/10/18 1146          Transfer Goal 1 (PT)    Activity/Assistive Device (Transfer Goal 1, PT) bed-to-chair/chair-to-bed;walker, rolling   knee immobilizer  -JR     [place] Level/Cues Needed (Transfer Goal 1, PT) minimum assist (75% or more patient effort)  -JR     Time Frame (Transfer Goal 1, PT) 2 weeks  -     Row Name 03/10/18 1146          Positioning and Restraints    Pre-Treatment Position in bed  -JR     Post Treatment Position bed  -JR     In Bed supine;call light within reach;encouraged to call for assist;exit alarm on;notified nsg  -[facility] Name 03/10/18 1146          Living Environment    Home Accessibility wheelchair accessible  -       User Key  (r) = Recorded By, (t) = Taken By, (c) = Cosigned By    Initials Name Provider Type    JR Carolina Joradn PT Physical Therapist          Physical Therapy Education     Title: PT OT SLP Therapies (Active)     Topic: Physical Therapy (Active)     Point: Mobility training (Active)    Learning Progress Summary     Learner Status Readiness Method Response Comment Documented by    Patient Active Nonacceptance D,E NR Importance of mobility to recovery  03/10/18 1311                      User Key     Initials Effective  Dates Name Provider Type [facility] 03/07/18 -  Carolina Jordan PT Physical Therapist PT                PT Recommendation and Plan  Planned Therapy Interventions (PT Eval): balance training, strengthening, bed mobility training, transfer training, gait training, patient/family education  Therapy Frequency (PT Clinical Impression): 2 times/day (daily Sat/Sun)  Plan of Care Reviewed With: patient  Outcome Summary: PT evaluation completed and patient demonstrates decreased strength, transfers and balance.  She lives at [facility] and is primarily bedbound.  She is expected to make progress with additional PT services.  Plan of Care Reviewed With: patient          Outcome Measures     Row Name 03/10/18 1146             How much help from another person do you currently need...    Turning from your back to your side while in flat bed without using bedrails? 3  -JR      Moving from lying on back to sitting on the side of a flat bed without bedrails? 2  -JR      Moving to and from a bed to a chair (including a wheelchair)? 2  -JR      Standing up from a chair using your arms (e.g., wheelchair, bedside chair)? 2  -JR      Climbing 3-5 steps with a railing? 1  -JR      To walk in hospital room? 1  -JR      AM-PAC 6 Clicks Score 11  -JR         Functional Assessment    Outcome Measure Options AM-PAC 6 Clicks Basic Mobility (PT)  -        User Key  (r) = Recorded By, (t) = Taken By, (c) = Cosigned By    Initials Name Provider Type     Carolina Jordan PT Physical Therapist           Time Calculation:         PT Charges     Row Name 03/10/18 1314             Time Calculation    Start Time 1146  -JR      PT Received On 03/10/18  -      PT Goal Re-Cert Due Date 03/20/18  -        User Key  (r) = Recorded By, (t) = Taken By, (c) = Cosigned By    Initials Name Provider Type    JR aCrolina Jordan PT Physical Therapist          Therapy Charges for Today     Code Description Service Date Service Provider Modifiers Qty     [id number]  PT EVAL MOD COMPLEXITY 4 3/10/2018 Carolina Jordan, PT GP 1          PT G-Codes  Outcome Measure Options: AM-PAC 6 Clicks Basic Mobility (PT)      Carolina Jordan, PT  3/10/2018

## 2018-03-10 NOTE — PLAN OF CARE
Problem: Fall Risk (Adult)  Goal: Identify Related Risk Factors and Signs and Symptoms  Outcome: Ongoing (interventions implemented as appropriate)    Goal: Absence of Fall  Outcome: Ongoing (interventions implemented as appropriate)    Goal: Identify Related Risk Factors and Signs and Symptoms  Outcome: Outcome(s) achieved Date Met: 03/10/18    Goal: Absence of Fall  Outcome: Ongoing (interventions implemented as appropriate)      Problem: Pain, Acute (Adult)  Goal: Acceptable Pain Control/Comfort Level  Outcome: Ongoing (interventions implemented as appropriate)   03/10/18 0613   Pain, Acute (Adult)   Acceptable Pain Control/Comfort Level unable to achieve outcome

## 2018-03-10 NOTE — PLAN OF CARE
Problem: Perioperative Period (Adult)  Intervention: Monitor/Manage Pain   03/09/18 2043   Safety Interventions   Medication Review/Management medications reviewed   Manage Acute Burn Pain   Pain Management Interventions pillow support;cold applied     Intervention: Monitor/Manage Postoperative Bleeding   03/09/18 2043   Safety Interventions   Bleeding Management affected area elevated       Goal: Signs and Symptoms of Listed Potential Problems Will be Absent or Manageable (Perioperative Period)  Outcome: Ongoing (interventions implemented as appropriate)   03/09/18 2135   Perioperative Period   Problems Assessed (Perioperative Period) all   Problems Present (Perioperative Period) other (see comments)   Pt meets PACU discharge criteria.

## 2018-03-10 NOTE — PROGRESS NOTES
[facility]     LOS: 1 day    Patient Care Team:  Abilio Garcia MD as PCP - General    Chief Complaint:    Chief Complaint   Patient presents with   • Leg Injury   • Fall       Subjective   I have reviewed labs/imaging/records from this hospitalization, including ER staff and admitting/attending physicians H/P's and progress notes to establish a comprehensive understanding of this patient's clinical hospital course, as well as to establish plan of care appropriately.     Patient seen and examined this morning.  Events from last night noted.  Patient denies having any fevers chills.  No nausea or vomiting no abdominal pain.  Denies any chest pain shortness of breath cough or sputum production.  There is no significant edema.   Patient also denies having new onset weakness of numbness of either extremity. She does complain of some hip pain that the surgical site.  Blood pressure has been noted to be low when she gets morphine.      Review of Systems:    The pertinent  ROS was done and it is noted above, rest  was negative.    Objective     Vital Signs  /71 (BP Location: Right arm, Patient Position: Lying)   Pulse 88   Temp 98 °F (36.7 °C) (Axillary)   Resp 20   Ht 157.5 cm (62\")   Wt 58.4 kg (128 lb 11.2 oz)   SpO2 96%   BMI 23.54 kg/m²       No intake/output data recorded.    Intake/Output Summary (Last 24 hours) at 03/10/18 1033  Last data filed at 03/09/18 2152   Gross per 24 hour   Intake             1000 ml   Output              330 ml   Net              670 ml       Physical Exam:    General Appearance: alert, no acute distress,   HEENT: Oral mucosa dry, extra occular movements intact. Sclera clear.  Skin: Warm and dry  Neck: supple, no JVD, trachea midline  Lungs:Chest shape is normal. Breath sounds heard bilaterally equally. No crackles, No wheezing.   Heart: RRR, normal S1 and S2, no S3, no rub, peripheral pulses weak but palpable.  Abdomen: " soft, non-tender,  present bowel sounds to auscultation  : no palpable bladder.  Extremities: no edema, cyanosis or clubbing.   Neuro: normal speech     Results Review:      Results from last 7 days  Lab Units 03/10/18  0711 03/09/18  1451 03/09/18  0552 03/08/18 2017   SODIUM mmol/L 148* 144 146* 142   POTASSIUM mmol/L 3.9 3.4* 3.9 3.5   CHLORIDE mmol/L 113* 109* 107 105   CO2 mmol/L 19.0* 21.0* 21.0* 20.0*   BUN mg/dL 9 9 12 14   CREATININE mg/dL 0.50* 0.50* 0.50* 0.60   CALCIUM mg/dL 8.1* 8.8 9.5 9.4   BILIRUBIN mg/dL  --  1.6*  --  1.3   ALK PHOS U/L  --  66  --  72   ALT (SGPT) U/L  --  30  --  29   AST (SGOT) U/L  --  20  --  20   GLUCOSE mg/dL 165* 117* 123* 164*       Estimated Creatinine Clearance: 40.5 mL/min (by C-G formula based on SCr of 0.5 mg/dL (L)).      Results from last 7 days  Lab Units 03/09/18  0552   MAGNESIUM mg/dL 1.9               Results from last 7 days  Lab Units 03/10/18  0711 03/09/18 2247 03/09/18  1451 03/09/18 0552 03/08/18 2017   WBC 10*3/mm3 9.29  --  10.32 11.91* 16.07*   HEMOGLOBIN g/dL 10.4* 10.3* 12.2 13.2 12.5   PLATELETS 10*3/mm3 279  --  336 332 327         Results from last 7 days  Lab Units 03/09/18  1451 03/08/18 2017   INR  1.31* 1.27*         Imaging Results (last 24 hours)     Procedure Component Value Units Date/Time    FL C Arm During Surgery [[id number]] Resulted:  03/09/18 2127     Updated:  03/09/18 2127    Narrative:       This procedure was auto-finalized with no dictation required.    XR Chest 1 View [[id number]] Collected:  03/09/18 1521     Updated:  03/09/18 1524    Narrative:       PROCEDURE: XR CHEST 1 VW-     HISTORY: PREOPERATION FOR NON-CORONARY CARDIAC SURGERY     COMPARISON: 3/8/2018.     FINDINGS: The heart is normal in size. The mediastinum is unremarkable.  There are low lung volumes with bibasilar atelectasis. There is no  pneumothorax.  There are no acute osseous abnormalities. There is an old  right humeral fracture. There are advanced  degenerative changes in the  left shoulder.       Impression:       No acute cardiopulmonary process.     Continued followup is recommended.     This report was finalized on 3/9/2018 3:22 PM by Ross Bueno M.D..          amLODIPine 5 mg Oral Daily   enoxaparin 40 mg Subcutaneous Daily   latanoprost 1 drop Both Eyes Nightly   melatonin 3 mg Oral Nightly       sodium chloride 125 mL/hr Last Rate: 125 mL/hr (03/09/18 1436)       Medication Review:   Current Facility-Administered Medications   Medication Dose Route Frequency Provider Last Rate Last Dose   • amLODIPine (NORVASC) tablet 5 mg  5 mg Oral Daily Levy Charles MD   5 mg at 03/10/18 0954   • bisacodyl (DULCOLAX) suppository 10 mg  10 mg Rectal Daily PRN Man Lyons MD       • docusate sodium (COLACE) capsule 100 mg  100 mg Oral BID PRN Man Lyons MD       • enoxaparin (LOVENOX) syringe 40 mg  40 mg Subcutaneous Daily Man Lyons MD   40 mg at 03/10/18 0954   • HYDROcodone-acetaminophen (NORCO) 5-325 MG per tablet 1 tablet  1 tablet Oral Q6H PRN Levy Charles MD   1 tablet at 03/10/18 1024   • latanoprost (XALATAN) 0.005 % ophthalmic solution 1 drop  1 drop Both Eyes Nightly Levy Charles MD       • melatonin tablet 3 mg  3 mg Oral Nightly Man Lyons MD       • morphine injection 2 mg  2 mg Intravenous Q2H PRN Abilio Garcia MD   2 mg at 03/10/18 0600    And   • naloxone (NARCAN) injection 0.4 mg  0.4 mg Intravenous Q5 Min PRN Abilio Garcia MD       • ondansetron (ZOFRAN) injection 4 mg  4 mg Intravenous Q6H PRN Levy Charles MD       • ondansetron (ZOFRAN) tablet 4 mg  4 mg Oral Q6H PRN Man Lyons MD        Or   • ondansetron ODT (ZOFRAN-ODT) disintegrating tablet 4 mg  4 mg Oral Q6H PRN Man Lyons MD        Or   • ondansetron (ZOFRAN) injection 4 mg  4 mg Intravenous Q6H PRN Man Lyons MD       • sodium chloride 0.9 % flush 1-10 mL  1-10 mL Intravenous PRN Levy Charles MD   10 mL at  03/09/18 1435   • sodium chloride 0.9 % flush 1-10 mL  1-10 mL Intravenous PRN Man Lyons MD       • sodium chloride 0.9 % infusion  125 mL/hr Intravenous Continuous Abilio Garcia  mL/hr at 03/09/18 1436         Assessment/Plan     1.   Closed fracture of left distal femur, Status post repair  2.   Status post fall  3.   Osteoarthritis  4.   Essential hypertension  5.   Hypotension    Plan:  · Continue current medications except I will change the morphine  To 0.5 mg Dilaudid IV every 4 hours when necessary.   · Because of persistently low blood pressure will stop the Norvasc.  · Continue IV fluids for the next 24 hours but can be stopped the tomorrow if the blood pressure is more stable  · Increased sodium noted likely secondary to saline once it is stopped at think it'll go back to normal.  · Rehabilitation placement as per .  · Surveillance lab.  · Details were discussed with the patient no family in the room.    · Further recommendations will depend on clinical course of the patient during the current hospitalization.   · I also discussed the details with the nursing staff.  · Rest as ordered.    Ranjit Frias MD  03/10/18  10:33 AM    Dictated using Dragon.

## 2018-03-10 NOTE — ANESTHESIA POSTPROCEDURE EVALUATION
Patient: Stefania Kirkpatrick    Procedure Summary     Date Anesthesia Start Anesthesia Stop Room / Location    03/09/18 1759 2036  [facility]       Procedure Diagnosis Surgeon Provider    LEFT DISTAL FEMUR PERIPROSTHETIC FRACTURE INTRAMEDULLARY NAILING RETROGRADE (Left Thigh) Status post fall; Other closed fracture of distal end of left femur, initial encounter  (Status post fall [Z91.81]; Other closed fracture of distal end of left femur, initial encounter [S72.492A]) MD Stanislav Raygoza CRNA          Anesthesia Type: general  Last vitals  BP   100/50 (03/09/18 2112)   Temp   99 °F (37.2 °C) (03/09/18 2045)   Pulse   108 (03/09/18 2112)   Resp   22 (03/09/18 2112)     SpO2   100 % (03/09/18 2112)     Post Anesthesia Care and Evaluation    Patient location during evaluation: PACU  Patient participation: complete - patient participated  Level of consciousness: awake  Pain score: 3  Pain management: adequate  Airway patency: patent  Anesthetic complications: No anesthetic complications  PONV Status: controlled  Cardiovascular status: acceptable and stable  Respiratory status: acceptable and face mask  Hydration status: acceptable

## 2018-03-10 NOTE — PLAN OF CARE
Problem: Patient Care Overview  Goal: Plan of Care Review  Outcome: Ongoing (interventions implemented as appropriate)   03/10/18 7865   Coping/Psychosocial   Plan of Care Reviewed With patient   OTHER   Outcome Summary PT evaluation completed and patient demonstrates decreased strength, transfers and balance. She lives at [facility] and is primarily bedbound. She is expected to make progress with additional PT services.

## 2018-03-11 LAB
ACINETOBACTER SCREEN CX: NORMAL
BACTERIA SPEC AEROBE CULT: NORMAL

## 2018-03-11 PROCEDURE — 94799 UNLISTED PULMONARY SVC/PX: CPT

## 2018-03-11 PROCEDURE — 25010000002 HYDROMORPHONE PER 4 MG: Performed by: INTERNAL MEDICINE

## 2018-03-11 PROCEDURE — 93005 ELECTROCARDIOGRAM TRACING: CPT | Performed by: FAMILY MEDICINE

## 2018-03-11 PROCEDURE — 99024 POSTOP FOLLOW-UP VISIT: CPT | Performed by: ORTHOPAEDIC SURGERY

## 2018-03-11 PROCEDURE — 99232 SBSQ HOSP IP/OBS MODERATE 35: CPT | Performed by: INTERNAL MEDICINE

## 2018-03-11 PROCEDURE — 25010000002 ENOXAPARIN PER 10 MG: Performed by: ORTHOPAEDIC SURGERY

## 2018-03-11 RX ORDER — DEXTROSE AND SODIUM CHLORIDE 5; .45 G/100ML; G/100ML
75 INJECTION, SOLUTION INTRAVENOUS CONTINUOUS
Status: DISCONTINUED | OUTPATIENT
Start: 2018-03-11 | End: 2018-03-13

## 2018-03-11 RX ADMIN — SODIUM CHLORIDE 125 ML/HR: 9 INJECTION, SOLUTION INTRAVENOUS at 01:19

## 2018-03-11 RX ADMIN — HYDROCODONE BITARTRATE AND ACETAMINOPHEN 1 TABLET: 5; 325 TABLET ORAL at 20:47

## 2018-03-11 RX ADMIN — DEXTROSE AND SODIUM CHLORIDE 75 ML/HR: 5; 450 INJECTION, SOLUTION INTRAVENOUS at 17:22

## 2018-03-11 RX ADMIN — ENOXAPARIN SODIUM 40 MG: 40 INJECTION SUBCUTANEOUS at 08:30

## 2018-03-11 RX ADMIN — Medication 10 ML: at 08:30

## 2018-03-11 RX ADMIN — HYDROMORPHONE HYDROCHLORIDE 0.5 MG: 10 INJECTION, SOLUTION INTRAMUSCULAR; INTRAVENOUS; SUBCUTANEOUS at 17:23

## 2018-03-11 RX ADMIN — MELATONIN TAB 3 MG 3 MG: 3 TAB at 20:42

## 2018-03-11 RX ADMIN — DOCUSATE SODIUM 100 MG: 100 CAPSULE, LIQUID FILLED ORAL at 08:30

## 2018-03-11 RX ADMIN — HYDROMORPHONE HYDROCHLORIDE 0.5 MG: 10 INJECTION, SOLUTION INTRAMUSCULAR; INTRAVENOUS; SUBCUTANEOUS at 06:33

## 2018-03-11 RX ADMIN — HYDROMORPHONE HYDROCHLORIDE 0.5 MG: 10 INJECTION, SOLUTION INTRAMUSCULAR; INTRAVENOUS; SUBCUTANEOUS at 01:29

## 2018-03-11 RX ADMIN — LATANOPROST 1 DROP: 50 SOLUTION OPHTHALMIC at 20:42

## 2018-03-11 RX ADMIN — HYDROCODONE BITARTRATE AND ACETAMINOPHEN 1 TABLET: 5; 325 TABLET ORAL at 08:30

## 2018-03-11 NOTE — PROGRESS NOTES
[facility]    Name:  Stefania Kirkpatrick   Age:  89 y.o.  Sex:  female  :  1924  MRN:  [id number]   Visit Number:  [id number]  Admission Date:  3/8/2018  Date Of Service:  18  Primary Care Physician:  Abilio Garcia MD     LOS: 2 days :  Patient Care Team:  Abilio Garcia MD as PCP - General:    History taken from:     chart    Chief Complaint:      Distal femur fracture    Subjective     Interval History:     Patient is a 89-year-old with history of hypertension who came from a nursing home after having left leg pain after a fall a week ago.  Initial left hip x-ray showed no fractures.  She continued to have left leg pain.  She was brought to the emergency room.  Dr. Lyons saw the patient.  Left femur x-ray confirmed distal femur fracture.  She was taken to the OR and this was repaired.    Patient was noted be hypotensive yesterday, she is given multiple doses of IV fluids, and placed on fluids overnight.  Her blood pressure is improved today.  Reduce IV fluids at the present time.  Patient is unable to answer any questions with any consistency.  There is no family present.        Review of Systems:     Able to obtain    Objective     Vital Signs:    Temp:  [98 °F (36.7 °C)-99.1 °F (37.3 °C)] 98.1 °F (36.7 °C)  Heart Rate:  [55-95] 79  Resp:  [16-20] 17  BP: ()/(50-94) 106/94    Physical Exam:      General Appearance:   Confused, uncooperative.  Moderate distress.     Head:    Normocephalic, without obvious abnormality, atraumatic   Eyes:            Lids and lashes normal, conjunctivae and sclerae normal, no   icterus, no pallor, corneas clear, PERRLA   Ears:    Ears appear intact with no abnormalities noted   Throat:   No oral lesions, no thrush, oral mucosa moist   Neck:   No adenopathy, supple, trachea midline, no thyromegaly, no     carotid bruit, no JVD   Back:     No kyphosis present, no scoliosis present, no skin lesions,        erythema or scars, no tenderness to percussion or                   palpation,   range of motion normal   Lungs:     Clear to auscultation,respirations regular, even and                   unlabored    Heart:    Regular rhythm and normal rate, normal S1 and S2, no            murmur, no gallop, no rub, no click   Breast Exam:    Deferred   Abdomen:     Normal bowel sounds, no masses, no organomegaly, soft        non-tender, non-distended, no guarding, no rebound                 tenderness   Genitalia:    Deferred   Extremities:   Unable to assess movement, no edema, no cyanosis, no              redness   Pulses:   Pulses palpable and equal bilaterally   Skin:   No bleeding, bruising or rash   Lymph nodes:   No palpable adenopathy   Neurologic:   Cranial nerves 2 - 12 grossly intact, sensation intact, DTR        present and equal bilaterally        Results Review:      I reviewed the patient's new clinical results.    Labs:    Lab Results (last 24 hours)     Procedure Component Value Units Date/Time    Acinetobacter Screen - Swab, Axilla, Left [[id number]]  (Normal) Collected:  03/08/18 2344    Specimen:  Swab from Axilla, Left Updated:  03/11/18 0542     ACINETOBACTER SCREEN CX No Acinetobacter isolated    Urine Culture - Urine, Urine, Clean Catch [[id number]] Collected:  03/09/18 1440    Specimen:  Urine from Urine, Clean Catch Updated:  03/11/18 0531     Urine Culture Mixed Rachele Isolated    Narrative:         Specimen contains mixed organisms of questionable pathogenicity which indicates contamination with commensal rachele.  Further identification is unlikely to provide clinically useful information.  Suggest recollection.           Radiology:    Imaging Results (last 24 hours)     ** No results found for the last 24 hours. **          Medication Review:       enoxaparin 40 mg Subcutaneous Daily   latanoprost 1 drop Both Eyes Nightly   melatonin 3 mg Oral Nightly            Assessment/Plan     Problem List Items  Addressed This Visit     * (Principal)Closed fracture of left distal femur - Primary    Relevant Orders    Case Request (Completed)    Status post fall    Relevant Orders    Case Request (Completed)      Other Visit Diagnoses    None.         1.   Closed fracture of left distal femur, Status post repair  2.   Status post fall  3.   Osteoarthritis  4.   Essential hypertension  5.   Hypotension, Due to hypovolemia.  Improved.  6. Dementia    Plan:    Continue IV fluids at 75 cc per hour at present.  Monitor blood pressure.  Check lab work in the a.m.  Hold blood pressure medications.  No arrhythmias noted, so we'll discontinue telemetry.  Patient will need rehabilitation.  Continue with Lovenox.  Further recommendations will then on the clinical course.    Ross Ruiz,   03/11/18  3:27 PM

## 2018-03-11 NOTE — PLAN OF CARE
Problem: Fall Risk (Adult)  Goal: Absence of Fall  Outcome: Ongoing (interventions implemented as appropriate)      Problem: Pain, Acute (Adult)  Goal: Acceptable Pain Control/Comfort Level  Outcome: Ongoing (interventions implemented as appropriate)      Problem: Infection, Risk/Actual (Adult)  Goal: Infection Prevention/Resolution  Outcome: Ongoing (interventions implemented as appropriate)      Problem: Skin Injury Risk (Adult)  Goal: Skin Health and Integrity  Outcome: Ongoing (interventions implemented as appropriate)      Problem: Patient Care Overview  Goal: Plan of Care Review  Outcome: Ongoing (interventions implemented as appropriate)   03/10/18 9467   Coping/Psychosocial   Plan of Care Reviewed With patient   Plan of Care Review   Progress improving

## 2018-03-11 NOTE — PLAN OF CARE
Problem: Pain, Acute (Adult)  Goal: Acceptable Pain Control/Comfort Level  Outcome: Ongoing (interventions implemented as appropriate)      Problem: Infection, Risk/Actual (Adult)  Goal: Infection Prevention/Resolution  Outcome: Ongoing (interventions implemented as appropriate)      Problem: Skin Injury Risk (Adult)  Goal: Skin Health and Integrity  Outcome: Ongoing (interventions implemented as appropriate)      Problem: Patient Care Overview  Goal: Plan of Care Review  Outcome: Ongoing (interventions implemented as appropriate)   03/11/18 1145   Coping/Psychosocial   Plan of Care Reviewed With patient   Plan of Care Review   Progress improving

## 2018-03-11 NOTE — PLAN OF CARE
Problem: Fall Risk (Adult)  Goal: Absence of Fall  Outcome: Ongoing (interventions implemented as appropriate)      Problem: Pain, Acute (Adult)  Goal: Identify Related Risk Factors and Signs and Symptoms  Outcome: Ongoing (interventions implemented as appropriate)      Problem: Patient Care Overview  Goal: Plan of Care Review  Outcome: Ongoing (interventions implemented as appropriate)   03/11/18 0320   Coping/Psychosocial   Plan of Care Reviewed With patient   OTHER   Outcome Summary Pt has experienced increased agitation and confusion tonight. Pain management has been as issue throughout the night as well.   Plan of Care Review   Progress no change

## 2018-03-11 NOTE — PROGRESS NOTES
[facility]  PROGRESS NOTE    Name:  Stefania Kirkpatrick   Age:  89 y.o.  Sex:  female  :  1924  MRN:  [id number]   Visit Number:  [id number]  Admission Date:  @ADMITDT  Date Of Service:  18  Primary Care Physician:  Abilio Garcia MD     LOS: 2 days :  Patient Care Team:  Abilio Garcia MD as PCP - General:    Chief Complaint:      POD #2 Retrograde IM nail for left distal femur lizeth-prosthetic fracture.  No acute complaints.    Subjective / Interval History:     She states her left thigh pain is less today.  Baseline from pre-op coginitive level with short appropriate responses and limited conversation.  No respiratory distress.  No abdominal pain.    Review of Systems:     General ROS: No fever spike, tmax 99.1 F, no loss of consciousness.  Ophthalmic ROS: no acute visual disturbance.  ENT ROS: No sinus congestion or sore throat.   Respiratory ROS: No shortness of breath.   Cardiovascular ROS: No chest pain or palpitations.  Gastrointestinal ROS: No acute abdominal change. Non-distended.  Genito-Urinary ROS: No reported dysuria or hematuria.  Musculoskeletal ROS: No deep calf pain. No acute focal motor deficit.  Left knee immobilizer in place.  Neurological ROS: No cyanosis, no new numbness or tingling.  Dermatological ROS: No erythema.  No rash or pruritis.    Vital Signs:    Temp:  [98 °F (36.7 °C)-99.1 °F (37.3 °C)] 98.1 °F (36.7 °C)  Heart Rate:  [55-95] 79  Resp:  [16-20] 17  BP: ()/(50-94) 106/94    Intake and output:    I/O last 3 completed shifts:  In:  [I.V.:]  Out: 430 [Urine:280; Blood:150]  No intake/output data recorded.    Physical Examination:    General Appearance:    Alert and cooperative, no acute distress.   Head:    Atraumatic and normocephalic, without obvious abnormality.   Eyes:    PERRLA.  Extraocular movements are within normal limits.   ENT:   No acute change.   Neck:   Supple, trachea midline.   Lungs:     Normal respirations,  unlabored.    Heart:    Regular rate.   Abdomen:     Soft non-tender, non-distended.   Extremities:   No new motor deficit, no calf pain, Brandan sign negative.   Skin:     No rash.  No cyanosis.  No erythema.  No active drainage. Skin is pristine.  With contact precautions, sterile dressing change done.   Neurologic:   Sensation intact, pulses intact.     Laboratory results:    Lab Results (last 24 hours)     Procedure Component Value Units Date/Time    Acinetobacter Screen - Swab, Axilla, Left [[id number]]  (Normal) Collected:  03/08/18 2344    Specimen:  Swab from Axilla, Left Updated:  03/11/18 0542     ACINETOBACTER SCREEN CX No Acinetobacter isolated    Urine Culture - Urine, Urine, Clean Catch [[id number]] Collected:  03/09/18 1440    Specimen:  Urine from Urine, Clean Catch Updated:  03/11/18 0531     Urine Culture Mixed Rachele Isolated    Narrative:         Specimen contains mixed organisms of questionable pathogenicity which indicates contamination with commensal rachele.  Further identification is unlikely to provide clinically useful information.  Suggest recollection.          I have reviewed the patient's laboratory results.    Radiology results:    Imaging Results (last 24 hours)     ** No results found for the last 24 hours. **          I have reviewed the patient's radiology reports.    Multiple C-arm images taken intraoperative show no acute concern.  Good position and alignment of comminuted fracture and fixation hardware.       Assessment/Plan    Principal Problem:    Closed fracture of left distal femur  Active Problems:    Status post fall    Osteoarthritis    Essential hypertension      POD #2 Retrograde IM nail for left distal femur lizeth-prosthetic fracture.     Continue current management per the detailed orders and instructions, including skin, safety and fall precautions, respiratory therapy with incentive spirometer, advance diet as tolerated, bowel regimen, multi-modal analgesia, multi-modal  DVT prophylaxis, Hospitalist consult, PT/OT following post-surgical rehab protocol, and Case Management for discharge plans.    Man Lyons MD  03/11/18  3:32 PM

## 2018-03-12 LAB
ALBUMIN SERPL-MCNC: 3.1 G/DL (ref 3.5–5)
ANION GAP SERPL CALCULATED.3IONS-SCNC: 14 MMOL/L
BUN BLD-MCNC: 9 MG/DL (ref 7–20)
BUN/CREAT SERPL: 18 (ref 7.1–23.5)
CALCIUM SPEC-SCNC: 8.6 MG/DL (ref 8.4–10.2)
CHLORIDE SERPL-SCNC: 114 MMOL/L (ref 98–107)
CO2 SERPL-SCNC: 22 MMOL/L (ref 26–30)
CREAT BLD-MCNC: 0.5 MG/DL (ref 0.6–1.3)
DEPRECATED RDW RBC AUTO: 53.9 FL (ref 37–54)
ERYTHROCYTE [DISTWIDTH] IN BLOOD BY AUTOMATED COUNT: 15.4 % (ref 11.5–14.5)
GFR SERPL CREATININE-BSD FRML MDRD: 115 ML/MIN/1.73
GLUCOSE BLD-MCNC: 150 MG/DL (ref 74–98)
HCT VFR BLD AUTO: 28.7 % (ref 37–47)
HGB BLD-MCNC: 9.3 G/DL (ref 12–16)
MAGNESIUM SERPL-MCNC: 1.8 MG/DL (ref 1.6–2.3)
MCH RBC QN AUTO: 30.9 PG (ref 27–31)
MCHC RBC AUTO-ENTMCNC: 32.4 G/DL (ref 30–37)
MCV RBC AUTO: 95.3 FL (ref 81–99)
MRSA SPEC QL CULT: NORMAL
PHOSPHATE SERPL-MCNC: 2.1 MG/DL (ref 2.5–4.5)
PLATELET # BLD AUTO: 346 10*3/MM3 (ref 130–400)
PMV BLD AUTO: 10.4 FL (ref 6–12)
POTASSIUM BLD-SCNC: 3 MMOL/L (ref 3.5–5.1)
RBC # BLD AUTO: 3.01 10*6/MM3 (ref 4.2–5.4)
SODIUM BLD-SCNC: 147 MMOL/L (ref 137–145)
WBC NRBC COR # BLD: 12.12 10*3/MM3 (ref 4.8–10.8)

## 2018-03-12 PROCEDURE — 97110 THERAPEUTIC EXERCISES: CPT

## 2018-03-12 PROCEDURE — 83735 ASSAY OF MAGNESIUM: CPT | Performed by: INTERNAL MEDICINE

## 2018-03-12 PROCEDURE — 25010000002 ENOXAPARIN PER 10 MG: Performed by: ORTHOPAEDIC SURGERY

## 2018-03-12 PROCEDURE — 97530 THERAPEUTIC ACTIVITIES: CPT

## 2018-03-12 PROCEDURE — 85027 COMPLETE CBC AUTOMATED: CPT | Performed by: INTERNAL MEDICINE

## 2018-03-12 PROCEDURE — 25010000002 HYDROMORPHONE PER 4 MG: Performed by: INTERNAL MEDICINE

## 2018-03-12 PROCEDURE — 80069 RENAL FUNCTION PANEL: CPT | Performed by: INTERNAL MEDICINE

## 2018-03-12 PROCEDURE — 97165 OT EVAL LOW COMPLEX 30 MIN: CPT

## 2018-03-12 RX ORDER — POTASSIUM CHLORIDE 20 MEQ/1
20 TABLET, EXTENDED RELEASE ORAL DAILY
Status: DISCONTINUED | OUTPATIENT
Start: 2018-03-12 | End: 2018-03-12

## 2018-03-12 RX ORDER — POTASSIUM CHLORIDE 20 MEQ/1
20 TABLET, EXTENDED RELEASE ORAL 2 TIMES DAILY WITH MEALS
Status: DISCONTINUED | OUTPATIENT
Start: 2018-03-12 | End: 2018-03-14 | Stop reason: HOSPADM

## 2018-03-12 RX ADMIN — ENOXAPARIN SODIUM 40 MG: 40 INJECTION SUBCUTANEOUS at 09:10

## 2018-03-12 RX ADMIN — HYDROCODONE BITARTRATE AND ACETAMINOPHEN 1 TABLET: 5; 325 TABLET ORAL at 18:51

## 2018-03-12 RX ADMIN — LATANOPROST 1 DROP: 50 SOLUTION OPHTHALMIC at 22:06

## 2018-03-12 RX ADMIN — POTASSIUM CHLORIDE 20 MEQ: 1500 TABLET, EXTENDED RELEASE ORAL at 18:25

## 2018-03-12 RX ADMIN — DEXTROSE AND SODIUM CHLORIDE 75 ML/HR: 5; 450 INJECTION, SOLUTION INTRAVENOUS at 22:07

## 2018-03-12 RX ADMIN — HYDROCODONE BITARTRATE AND ACETAMINOPHEN 1 TABLET: 5; 325 TABLET ORAL at 02:42

## 2018-03-12 RX ADMIN — MELATONIN TAB 3 MG 3 MG: 3 TAB at 22:05

## 2018-03-12 RX ADMIN — HYDROMORPHONE HYDROCHLORIDE 0.5 MG: 10 INJECTION, SOLUTION INTRAMUSCULAR; INTRAVENOUS; SUBCUTANEOUS at 00:47

## 2018-03-12 RX ADMIN — HYDROCODONE BITARTRATE AND ACETAMINOPHEN 1 TABLET: 5; 325 TABLET ORAL at 09:11

## 2018-03-12 RX ADMIN — DEXTROSE AND SODIUM CHLORIDE 75 ML/HR: 5; 450 INJECTION, SOLUTION INTRAVENOUS at 09:10

## 2018-03-12 NOTE — THERAPY EVALUATION
Acute Care - Occupational Therapy Initial Evaluation  [facility]     Patient Name: Stefania Kirkpatrick  : 1924  MRN: [id number]  Today's Date: 3/12/2018  Onset of Illness/Injury or Date of Surgery: 18  Date of Referral to OT: 18  Referring Physician: Dr. Lyons    Admit Date: 3/8/2018       ICD-10-CM ICD-9-CM   1. Other closed fracture of distal end of left femur, initial encounter S72.492A 821.29   2. Status post fall Z91.81 V15.88   3. Impaired functional mobility, balance, gait, and endurance Z74.09 V49.89   4. Impaired mobility and ADLs Z74.09 799.89     Patient Active Problem List   Diagnosis   • Closed fracture of left distal femur   • Status post fall   • Osteoarthritis   • Essential hypertension     Past Medical History:   Diagnosis Date   • Hypertension    • Hypertensive heart disease without CHF    • Hypoxemia    • Osteoarthritis    • Shoulder joint contracture, right      Past Surgical History:   Procedure Laterality Date   • JOINT REPLACEMENT     • TOTAL KNEE ARTHROPLASTY Bilateral           OT ASSESSMENT FLOWSHEET (last 72 hours)      Occupational Therapy Evaluation     Row Name 18 1520                   OT Evaluation Time/Intention    Subjective Information complains of;pain  -SD        Document Type evaluation  -SD        Mode of Treatment occupational therapy  -SD        Patient Effort fair  -SD        Symptoms Noted During/After Treatment increased pain  -SD           General Information    Patient Profile Reviewed? yes  -SD        Onset of Illness/Injury or Date of Surgery 18  -SD        Referring Physician Dr. Lyons  -SD        Patient Observations alert;poorly cooperative  -SD        Patient/Family Observations Supine in bed, knee immobilizer on L LE  -SD        General Observations of Patient Supine on O2, IV intact, knee immobilized  -SD        Prior Level of Function --   Resident at [facility] for mobility  -SD        Equipment Currently Used at Home  wheelchair  -SD        Pertinent History of Current Functional Problem Fall with left distal femur fx  -SD        Existing Precautions/Restrictions --   Left knee immobilizer worn at all times  -SD        Limitations/Impairments safety/cognitive  -SD        Risks Reviewed patient:;increased discomfort  -SD        Benefits Reviewed patient:;improve function;increase independence;increase strength  -SD        Barriers to Rehab previous functional deficit;cognitive status  -SD           Relationship/Environment    Primary Source of Support/Comfort nonrelative caregiver  -SD        Name of Support/Comfort Primary Source [place]  -SD        Lives With facility resident  -SD           Resource/Environmental Concerns    Current Living Arrangements extended care facility  -SD           Cognitive Assessment/Intervention- PT/OT    Orientation Status (Cognition) disoriented to;place;person;situation;time  -SD        Follows Commands (Cognition) does not follow one step commands;delayed response/completion;physical/tactile prompts required  -SD        Safety Deficit (Cognitive) severe deficit  -SD           Safety Issues, Functional Mobility    Safety Issues Affecting Function (Mobility) sequencing abilities;at risk behavior observed  -SD        Impairments Affecting Function (Mobility) balance;cognition;coordination;postural/trunk control;strength;pain  -SD           Mobility Assessment/Treatment    Extremity Weight-bearing Status left lower extremity  -SD        Left Lower Extremity (Weight-bearing Status) weight-bearing as tolerated (WBAT)  -SD           Bed Mobility Assessment/Treatment    Bed Mobility Assessment/Treatment supine-sit;sit-supine  -SD        Supine-Sit [place] (Bed Mobility) maximum assist (25% patient effort)  -SD        Sit-Supine [place] (Bed Mobility) maximum assist (25% patient effort)  -SD        Supine-Sit-Supine [place] (Bed Mobility) maximum assist (25% patient effort);2 person  assist  -SD        Bed Mobility, Safety Issues cognitive deficits limit understanding;decreased use of arms for pushing/pulling;decreased use of legs for bridging/pushing;impaired trunk control for bed mobility  -SD        Assistive Device (Bed Mobility) bed rails;draw sheet  -SD           Functional Mobility    Functional Mobility- Comment NT  -SD           Transfer Assessment/Treatment    Comment (Transfers) NT  -SD           ADL Assessment/Intervention    BADL Assessment/Intervention upper body dressing;lower body dressing;grooming;toileting  -SD           Upper Body Dressing Assessment/Training    Upper Body Dressing [place] Level maximum assist (25% patient effort)  -SD           Lower Body Dressing Assessment/Training    Lower Body Dressing [place] Level dependent (less than 25% patient effort)  -SD           Grooming Assessment/Training    [place] Level (Grooming) maximum assist (25% patient effort)  -SD           Toileting Assessment/Training    [place] Level (Toileting) dependent (less than 25% patient effort)  -SD           General ROM    GENERAL ROM COMMENTS WFL except NT L LE immobilized  -SD           General Assessment (Manual Muscle Testing)    Comment, General Manual Muscle Testing (MMT) Assessment 3-/5  -SD           Positioning and Restraints    Pre-Treatment Position in bed  -SD        Post Treatment Position bed  -SD        In Bed supine;call light within reach;encouraged to call for assist;exit alarm on  -SD           Pain Scale: Numbers Pre/Post-Treatment    Pain Scale: Numbers, Pretreatment --   unable to assess  -SD        Pain Scale: Numbers, Post-Treatment --   unable to assess  -SD           Plan of Care Review    Plan of Care Reviewed With patient  -SD           Clinical Impression (OT)    Date of Referral to OT 03/09/18  -SD        OT Diagnosis ADL decline  -SD        Patient/Family Goals Statement (OT Eval) Increase strength/mobility  -SD        Criteria for Skilled  Therapeutic Interventions Met (OT Eval) yes  -SD        Rehab Potential (OT Eval) good, to achieve stated therapy goals  -SD        Therapy Frequency (OT Eval) 3 times/wk   5 times if indicated  -SD        Anticipated Discharge Disposition (OT) skilled nursing facility (SNF)  -SD           Vital Signs    O2 Delivery Pre Treatment supplemental O2  -SD        O2 Delivery Intra Treatment supplemental O2  -SD        O2 Delivery Post Treatment supplemental O2  -SD           Planned OT Interventions    Planned Therapy Interventions (OT Eval) BADL retraining;passive ROM/stretching;patient/caregiver education/training;ROM/therapeutic exercise;strengthening exercise;transfer/mobility retraining  -SD           OT Goals    Bed Mobility Goal Selection (OT) bed mobility, OT goal 1  -SD        Transfer Goal Selection (OT) transfer, OT goal 1  -SD        Strength Goal Selection (OT) strength, OT goal 1  -SD        Balance Goal Selection (OT) balance, OT goal 1  -SD        Additional Documentation Balance Goal Selection (OT) (Row);Strength Goal Selection (OT) (Row)  -SD           Bed Mobility Goal 1 (OT)    Activity/Assistive Device (Bed Mobility Goal 1, OT) sit to supine/supine to sit  -SD        [place] Level/Cues Needed (Bed Mobility Goal 1, OT) moderate assist (50-74% patient effort)  -SD        Time Frame (Bed Mobility Goal 1, OT) 2 weeks  -SD        Progress/Outcomes (Bed Mobility Goal 1, OT) goal ongoing  -SD           Transfer Goal 1 (OT)    Activity/Assistive Device (Transfer Goal 1, OT) sit-to-stand/stand-to-sit;walker, rolling  -SD        [place] Level/Cues Needed (Transfer Goal 1, OT) moderate assist (50-74% patient effort)  -SD        Time Frame (Transfer Goal 1, OT) 2 weeks  -SD        Progress/Outcome (Transfer Goal 1, OT) goal ongoing  -SD           Strength Goal 1 (OT)    Strength Goal 1 (OT) Pt to perform AAROM/AROM to increase strength and endurance  -SD        Time Frame (Strength Goal 1, OT) 2 weeks   -SD        Progress/Outcome (Strength Goal 1, OT) goal ongoing  -SD           Balance Goal 1 (OT)    Activity/Assistive Device (Balance Goal 1, OT) sitting, static  -SD        [place] Level/Cues Needed (Balance Goal 1, OT) moderate assist (50-74% patient effort)  -SD        Time Frame (Balance Goal 1, OT) 2 weeks  -SD        Progress/Outcomes (Balance Goal 1, OT) goal ongoing  -SD          User Key  (r) = Recorded By, (t) = Taken By, (c) = Cosigned By    Initials Name Effective Dates    SD Mary Steinberg OT 03/07/18 -            Occupational Therapy Education     Title: PT OT SLP Therapies (Active)     Topic: Occupational Therapy (Active)     Point: ADL training (Active)     Description: Instruct learner(s) on proper safety adaptation and remediation techniques during self care or transfers.   Instruct in proper use of assistive devices.   Learning Progress Summary     Learner Status Readiness Method Response Comment Documented by    Patient Active Acceptance E NL Benefit of OT and OT POC SD 03/12/18 1658                      User Key     Initials Effective Dates Name Provider Type Discipline    SD 03/07/18 -  Mary Steinberg, OT Occupational Therapist OT                  OT Recommendation and Plan  Anticipated Discharge Disposition (OT): skilled nursing facility (SNF)  Planned Therapy Interventions (OT Eval): BADL retraining, passive ROM/stretching, patient/caregiver education/training, ROM/therapeutic exercise, strengthening exercise, transfer/mobility retraining  Therapy Frequency (OT Eval): 3 times/wk (5 times if indicated)  Plan of Care Review  Plan of Care Reviewed With: patient  Plan of Care Reviewed With: patient          Outcome Measures     Row Name 03/12/18 1520 03/12/18 0938 03/10/18 1146       How much help from another person do you currently need...    Turning from your back to your side while in flat bed without using bedrails?  -- 2  -RM 3  -JR    Moving from lying on back to sitting on the  side of a flat bed without bedrails?  -- 2  -RM 2  -JR    Moving to and from a bed to a chair (including a wheelchair)?  -- 2  -RM 2  -JR    Standing up from a chair using your arms (e.g., wheelchair, bedside chair)?  -- 2  -RM 2  -JR    Climbing 3-5 steps with a railing?  -- 1  -RM 1  -JR    To walk in hospital room?  -- 1  -RM 1  -JR    AM-PAC 6 Clicks Score  -- 10  -RM 11  -JR       How much help from another is currently needed...    Putting on and taking off regular lower body clothing? 1  -SD  --  --    Bathing (including washing, rinsing, and drying) 1  -SD  --  --    Toileting (which includes using toilet bed pan or urinal) 1  -SD  --  --    Putting on and taking off regular upper body clothing 2  -SD  --  --    Taking care of personal grooming (such as brushing teeth) 2  -SD  --  --    Eating meals 2  -SD  --  --    Score 9  -SD  --  --       Functional Assessment    Outcome Measure Options AM-PAC 6 Clicks Daily Activity (OT)  -SD AM-PAC 6 Clicks Basic Mobility (PT)  -RM AM-PAC 6 Clicks Basic Mobility (PT)  -JR      User Key  (r) = Recorded By, (t) = Taken By, (c) = Cosigned By    Initials Name Provider Type    JR Carolina Jordan, PT Physical Therapist    RM Lefi Whitt PTA Physical Therapy Assistant    DAVID Steinberg, OT Occupational Therapist          Time Calculation:   OT Start Time: 1520    Therapy Charges for Today     Code Description Service Date Service Provider Modifiers Qty    [id number]  OT EVAL LOW COMPLEXITY 4 3/12/2018 Mary Steinberg OT GO 1               Mary Steinberg OT  3/12/2018

## 2018-03-12 NOTE — OP NOTE
[facility] [address], [address]  [place]  [address]  [phone]      OPERATIVE REPORT       PATIENT NAME:  Stefania Kirkpatrick                            YOB: 1924       PREOP DIAGNOSIS:   Left spiral oblique comminuted displaced and unstable distal shaft femur periprosthetic fracture.    POSTOP DIAGNOSIS:   Same.    PROCEDURE:   Open reduction and internal fixation of Left distal shaft femur periprosthetic fracture with an interlocked retrograde intramedullary nail.    SURGEON:     Rashel Lyons MD    OPERATIVE TEAM:  Circulator: Fernanda Wu RN      Radiology Technologist: Min Mooney      Scrub Person: Theresa Reed CSA; Dunia Malin    ANESTHETIST:  CRNA: Stanislav Sahni CRNA    ANESTHESIA:  General    ESTIM BLOOD LOSS:   150 ml    FINDINGS:   Severe comminuted displaced angulated rotated unstable distal femur shaft fracture.    SPECIMENS:    None.    IMPLANTS:   Biomet retrograde intramedullary 12 x 280 mm femoral nail with three distal interlock screws 42,48, and 56 mm) and one proximal interlock screws (32 mm).    DRAINS:     [place] to gravity.    COMPLICATIONS:    None.    DISPOSITION:    Stable to recovery    INDICATIONS: Painful, displaced unstable distal femur fracture.    NARRATIVE:  Risks, benefits and alternatives discussed with the patient and family and informed consent for surgical procedure obtained.  Risks discussed including but not limited to anesthesia, infection, nerve/vessel/tendon injury, fracture, implant loosening, wear or dislocation, blood loss and possible transfusion, morbidities from any chronic medical conditions, DVT, PE and death.  Goals include pain relief, fracture stabilization, earlier mobilization and rehabilitation to baseline functional level.  Patient presents for planned surgery.  Antibiotic prophylaxis was given.  Surgeon site marking and a time out were performed prior to the procedure.  Anesthesia was effective and  well tolerated.  The patient was placed in the supine position with care taken to pad all areas of the body and observe skin precautions.  The leg was prepped and draped in the usual sterile fashion.    After sterile prep and drape, a small incision was made medial to the patella allowing entry to the knee joint.  A hand awl was passed retrograde into the distal femur through the intercondylar notch space.  Along the cannulated awl, a ball tipped guide wire was passed proximally across the long spiral oblique fracture and into the proximal femur canal.  C-arm image showed good position in AP and lateral planes, and imaging along with guide wire measurement assisted in determining nail diameter and length.  Over the guidewire, serial femur canal reaming was performed as appropriate 1.5 cm above the nail diameter size and in good position.    Over the wire, the actual retrograde nail was passed across the fracture site in good position and alignment.  The nail entry handle slotted guides were used to place three distal interlock screws lateral to medial.  Screws were placed using standard trochar placement, sleeve protected drilling, depth gauge measurement, screw placement and image confirmation.  With C-arm image and freehand technique, the proximal interlock screw was placed anterior to posterior and other than freehand without handle slot, in the same manner as the distal screws.  The entry handle assembly was then removed as the final nail construct was complete.    Final C-arm views were saved showing a good fracture reduction, alignment, length and correction of rotation, and good hardware position.  The small wounds were irrigated copiously with antibiotic solution.  Routine closure was performed and a sterile dressing applied.  The patient was placed in a knee immobilizer.  The patient was gently removed from the operating room table and supine on the hospital bed.  Anesthesia was effective and well  tolerated.  There were no complications of the procedure.  The patient was transferred stable to recovery.    Man Lyons MD  3-9-18  9:00PM

## 2018-03-12 NOTE — PROGRESS NOTES
Principal Problem:    Closed fracture of left distal femur  Active Problems:    Status post fall    Osteoarthritis    Essential hypertension          Subjective   She is sitting up being fed without evidence of aspiration.  She moans a lot and states that her left leg hurts otherwise I can discern no additional complaints shortness breath chest pain abdominal issues or dysuria      Vital Signs  Temp:  [97.5 °F (36.4 °C)-98.6 °F (37 °C)] 97.5 °F (36.4 °C)  Heart Rate:  [70-80] 70  Resp:  [17-20] 20  BP: (106-128)/(65-94) 128/70    Physical Exam:   General Appearance:    Alert, cooperative, in no acute distress   Head:    Normocephalic, without obvious abnormality, atraumatic       Throat:      Neck:     Back:        Lungs:     Clear to auscultation percussion normal chest expansion     Heart:   Regular rhythm without murmur or S3    Chest Wall:       Abdomen:        Extremities:   No lower extremity edema    Pulses:      Skin:      Lymph nodes:      Neurologic:            Results Review:     I reviewed the patient's new clinical results.    Lab Results (last 24 hours)     Procedure Component Value Units Date/Time    Renal Function Panel [[id number]]  (Abnormal) Collected:  03/12/18 0715    Specimen:  Blood Updated:  03/12/18 0749     Glucose 150 (H) mg/dL      BUN 9 mg/dL      Creatinine 0.50 (L) mg/dL      Sodium 147 (H) mmol/L      Potassium 3.0 (L) mmol/L      Chloride 114 (H) mmol/L      CO2 22.0 (L) mmol/L      Calcium 8.6 mg/dL      Albumin 3.10 (L) g/dL      Phosphorus 2.1 (L) mg/dL      Anion Gap 14.0 mmol/L      BUN/Creatinine Ratio 18.0     eGFR Non African Amer 115 mL/min/1.73     Narrative:       The MDRD GFR formula is only valid for adults with stable renal function between ages 18 and 70.    Magnesium [[id number]]  (Normal) Collected:  03/12/18 0715    Specimen:  Blood Updated:  03/12/18 0749     Magnesium 1.8 mg/dL     MRSA Screen Culture - Swab, Nares [[id number]]  (Normal) Collected:  03/08/18 0688     Specimen:  Swab from Nares Updated:  03/12/18 0732     MRSA SCREEN CX No Methicillin Resistant Staphylococcus aureus isolated    CBC (No Diff) [[id number]]  (Abnormal) Collected:  03/12/18 0715    Specimen:  Blood Updated:  03/12/18 0730     WBC 12.12 (H) 10*3/mm3      RBC 3.01 (L) 10*6/mm3      Hemoglobin 9.3 (L) g/dL      Hematocrit 28.7 (L) %      MCV 95.3 fL      MCH 30.9 pg      MCHC 32.4 g/dL      RDW 15.4 (H) %      RDW-SD 53.9 fl      MPV 10.4 fL      Platelets 346 10*3/mm3         Imaging Results (last 24 hours)     ** No results found for the last 24 hours. **            Abilio Garcia MD  03/12/18  12:29 PM

## 2018-03-12 NOTE — PROGRESS NOTES
Continued Stay Note   [facility]     Patient Name: Stefania Kirkpatrick  MRN: [id number]  Today's Date: 3/12/2018    Admit Date: 3/8/2018          Discharge Plan     Row Name 03/12/18 0941       Plan    Plan Pt is LTC Resident at [facility] and can return at discharge.  Cm will continue to follow.                Discharge Codes    No documentation.       Expected Discharge Date and Time     Expected Discharge Date Expected Discharge Time    Mar 12, 2018             Radha Santiago

## 2018-03-12 NOTE — THERAPY TREATMENT NOTE
Acute Care - Physical Therapy Treatment Note   [facility]     Patient Name: Stefania Kirkpatrick  : 1924  MRN: [id number]  Today's Date: 3/12/2018  Onset of Illness/Injury or Date of Surgery: 18  Date of Referral to PT: 18  Referring Physician: Man Lyons MD    Admit Date: 3/8/2018    Visit Dx:    ICD-10-CM ICD-9-CM   1. Other closed fracture of distal end of left femur, initial encounter S72.492A 821.29   2. Status post fall Z91.81 V15.88   3. Impaired functional mobility, balance, gait, and endurance Z74.09 V49.89     Patient Active Problem List   Diagnosis   • Closed fracture of left distal femur   • Status post fall   • Osteoarthritis   • Essential hypertension       Therapy Treatment    Therapy Treatment / Health Promotion    Treatment Time/Intention  Discipline: physical therapy assistant  Document Type: therapy note (daily note)  Subjective Information: complains of, pain, weakness  Mode of Treatment: physical therapy  Therapy Frequency (PT Clinical Impression): 2 times/day  Patient Effort: fair  Comment: Fear of falling   Treatment Considerations/Comments: Knee immobilizer on L LE during mobility   Plan of Care Review  Plan of Care Reviewed With: patient    Vitals/Pain/Safety  Pain Assessment  Additional Documentation: Pain Scale: Numbers Pre/Post-Treatment (Group)  Pain Scale: Numbers Pre/Post-Treatment  Pain Scale: Numbers, Pretreatment: 0/10 - no pain  Pain Scale: Numbers, Post-Treatment:  (unable to quantify)  Pain Location - Side: Left  Pain Location - Orientation: upper  Pain Location:  (leg )  Pain Scale: Word Pre/Post-Treatment  Pain Location - Side: Left  Pain Location - Orientation: upper  Pain Location:  (leg )  Pain Scale: FACES Pre/Post-Treatment  Pain Location - Side: Left  Pain Location - Orientation: upper  Pain Location:  (leg )  Safety Issues, Functional Mobility  Safety Issues Affecting Function (Mobility): ability to follow commands, safety precaution awareness, sequencing  abilities  Impairments Affecting Function (Mobility): balance, cognition, strength    Mobility,ADL,Motor, Modality  Mobility Assessment/Intervention  Extremity Weight-bearing Status: left lower extremity  Left Lower Extremity (Weight-bearing Status): weight-bearing as tolerated (WBAT)  Bed Mobility Assessment/Treatment  Rolling Left [place] (Bed Mobility): verbal cues, minimum assist (75% patient effort), moderate assist (50% patient effort)  Rolling Right [place] (Bed Mobility): verbal cues, minimum assist (75% patient effort), moderate assist (50% patient effort)  Supine-Sit [place] (Bed Mobility): verbal cues, minimum assist (75% patient effort), moderate assist (50% patient effort)  Bed Mobility, Safety Issues: cognitive deficits limit understanding, decreased use of arms for pushing/pulling, decreased use of legs for bridging/pushing, impaired trunk control for bed mobility  Assistive Device (Bed Mobility): bed rails, head of bed elevated  Transfer Assessment/Treatment  Transfer Assessment/Treatment: bed-chair transfer  Maintains Weight-bearing Status (Transfers): able to maintain  Bed-Chair Transfer  Bed-Chair [place] (Transfers): maximum assist (25% patient effort), verbal cues  Assistive Device (Bed-Chair Transfers):  (unable to use at this time )     Motor Skills Assessment/Interventions  Additional Documentation: Therapeutic Exercise (Group)  Therapeutic Exercise  Lower Extremity (Therapeutic Exercise): quad sets, right, SAQ (short arc quad), right, heel slides, right, SLR (straight leg raise), bilateral  Exercise Type (Therapeutic Exercise): AAROM (active assistive range of motion)  Position (Therapeutic Exercise): supine  Sets/Reps (Therapeutic Exercise): 1x10           ROM/MMT             Sensory, Edema, Orthotics          Cognition, Communication, Swallow       Outcome Summary  Outcome Summary/Treatment Plan (PT)  Daily Summary of Progress (PT): progress toward functional goals is  gradual  Barriers to Overall Progress (PT): Cognition and pain   Plan for Continued Treatment (PT): Cont tx per POC             PT Rehab Goals     Row Name 03/10/18 1146             Bed Mobility Goal 1 (PT)    Activity/Assistive Device (Bed Mobility Goal 1, PT) sit to supine/supine to sit  -JR      [place] Level/Cues Needed (Bed Mobility Goal 1, PT) minimum assist (75% or more patient effort)  -JR      Time Frame (Bed Mobility Goal 1, PT) 2 weeks  -JR         Transfer Goal 1 (PT)    Activity/Assistive Device (Transfer Goal 1, PT) bed-to-chair/chair-to-bed;walker, rolling   knee immobilizer  -JR      [place] Level/Cues Needed (Transfer Goal 1, PT) minimum assist (75% or more patient effort)  -JR      Time Frame (Transfer Goal 1, PT) 2 weeks  -JR        User Key  (r) = Recorded By, (t) = Taken By, (c) = Cosigned By    Initials Name Provider Type    JR Carolina Jordan PT Physical Therapist          Physical Therapy Education     Title: PT OT SLP Therapies (Active)     Topic: Physical Therapy (Active)     Point: Mobility training (Active)    Learning Progress Summary     Learner Status Readiness Method Response Comment Documented by    Patient Active Acceptance E NR   03/12/18 1307     Active Nonacceptance D,E NR Importance of mobility to recovery  03/10/18 1311          Point: Home exercise program (Active)    Learning Progress Summary     Learner Status Readiness Method Response Comment Documented by    Patient Active Acceptance E NR   03/12/18 1307          Point: Precautions (Active)    Learning Progress Summary     Learner Status Readiness Method Response Comment Documented by    Patient Active Acceptance E NR   03/12/18 1307                      User Key     Initials Effective Dates Name Provider Type Discipline     03/07/18 -  Carolina Jordan PT Physical Therapist PT     03/07/18 -  Leif Whitt PTA Physical Therapy Assistant PT                    PT Recommendation and Plan  Anticipated  Discharge Disposition (PT): inpatient rehab facility, nursing facility  Planned Therapy Interventions (PT Eval): balance training, strengthening, bed mobility training, transfer training, gait training, patient/family education  Therapy Frequency (PT Clinical Impression): 2 times/day  Daily Summary of Progress (PT): progress toward functional goals is gradual  Plan for Continued Treatment (PT): Cont tx per POC   Plan of Care Reviewed With: patient  Progress: improving  Outcome Summary: Pt is more toleranct to mobility this treatment. Pt cont to have negative vocalization with mobility however was able to tolerate with decreased assistance.  KI on with all mobility. Pt left UIC reclined with call light at hand. Nurse informed           Outcome Measures     Row Name 03/12/18 0938 03/10/18 1146          How much help from another person do you currently need...    Turning from your back to your side while in flat bed without using bedrails? 2  -RM 3  -JR     Moving from lying on back to sitting on the side of a flat bed without bedrails? 2  -RM 2  -JR     Moving to and from a bed to a chair (including a wheelchair)? 2  -RM 2  -JR     Standing up from a chair using your arms (e.g., wheelchair, bedside chair)? 2  -RM 2  -JR     Climbing 3-5 steps with a railing? 1  -RM 1  -JR     To walk in hospital room? 1  -RM 1  -JR     AM-PAC 6 Clicks Score 10  -RM 11  -JR        Functional Assessment    Outcome Measure Options AM-PAC 6 Clicks Basic Mobility (PT)  -RM AM-PAC 6 Clicks Basic Mobility (PT)  -JR       User Key  (r) = Recorded By, (t) = Taken By, (c) = Cosigned By    Initials Name Provider Type    JR Carolina Jordan PT Physical Therapist    JAY Whitt PTA Physical Therapy Assistant           Time Calculation:         PT Charges     Row Name 03/12/18 1312             Time Calculation    Start Time 0938  -RM      PT Received On 03/12/18  -RM      PT Goal Re-Cert Due Date 03/20/18  -RM         Time Calculation- PT     Total Timed Code Minutes- PT 26 minute(s)  -        User Key  (r) = Recorded By, (t) = Taken By, (c) = Cosigned By    Initials Name Provider Type     Leif Whitt PTA Physical Therapy Assistant          Therapy Charges for Today     Code Description Service Date Service Provider Modifiers Qty    76951502538 HC PT THER PROC EA 15 MIN 3/12/2018 Leif Whitt PTA GP 1    06853665767 HC PT THERAPEUTIC ACT EA 15 MIN 3/12/2018 Leif Whitt PTA GP 1          PT G-Codes  Outcome Measure Options: AM-PAC 6 Clicks Basic Mobility (PT)    Leif Whitt PTA  3/12/2018

## 2018-03-12 NOTE — PLAN OF CARE
Problem: Patient Care Overview  Goal: Plan of Care Review  Outcome: Ongoing (interventions implemented as appropriate)   03/12/18 1700   Coping/Psychosocial   Plan of Care Reviewed With patient   OTHER   Outcome Summary OT eval completed. Patient presents deficits in strength, endurance, balance, mobility and ADL performance, requiring max-TD for all functional mobility and ADL tasks. Patient is expected to benefit from OT services to improve overall functional performance and mobility prior to DC.    Plan of Care Review   Progress no change

## 2018-03-12 NOTE — PLAN OF CARE
Problem: Patient Care Overview  Goal: Plan of Care Review  Outcome: Ongoing (interventions implemented as appropriate)   03/12/18 6513   Coping/Psychosocial   Plan of Care Reviewed With patient   OTHER   Outcome Summary Pt is more toleranct to mobility this treatment. Pt cont to have negative vocalization with mobility however was able to tolerate with decreased assistance. KI on with all mobility. Pt left UIC reclined with call light at hand. Nurse informed    Plan of Care Review   Progress improving

## 2018-03-12 NOTE — PLAN OF CARE
Problem: Pain, Acute (Adult)  Goal: Identify Related Risk Factors and Signs and Symptoms  Outcome: Ongoing (interventions implemented as appropriate)    Goal: Acceptable Pain Control/Comfort Level  Outcome: Ongoing (interventions implemented as appropriate)      Problem: Patient Care Overview  Goal: Plan of Care Review  Outcome: Ongoing (interventions implemented as appropriate)      Problem: Fracture Orthopaedic (Adult)  Goal: Signs and Symptoms of Listed Potential Problems Will be Absent, Minimized or Managed (Fracture Orthopaedic)  Outcome: Ongoing (interventions implemented as appropriate)

## 2018-03-13 LAB
ALBUMIN SERPL-MCNC: 2.9 G/DL (ref 3.5–5)
ANION GAP SERPL CALCULATED.3IONS-SCNC: 15.2 MMOL/L
BASOPHILS # BLD AUTO: 0.03 10*3/MM3 (ref 0–0.2)
BASOPHILS NFR BLD AUTO: 0.2 % (ref 0–2.5)
BUN BLD-MCNC: 3 MG/DL (ref 7–20)
BUN/CREAT SERPL: 10 (ref 7.1–23.5)
CALCIUM SPEC-SCNC: 8.3 MG/DL (ref 8.4–10.2)
CHLORIDE SERPL-SCNC: 108 MMOL/L (ref 98–107)
CO2 SERPL-SCNC: 21 MMOL/L (ref 26–30)
CREAT BLD-MCNC: 0.3 MG/DL (ref 0.6–1.3)
DEPRECATED RDW RBC AUTO: 52.1 FL (ref 37–54)
EOSINOPHIL # BLD AUTO: 0.09 10*3/MM3 (ref 0–0.7)
EOSINOPHIL NFR BLD AUTO: 0.7 % (ref 0–7)
ERYTHROCYTE [DISTWIDTH] IN BLOOD BY AUTOMATED COUNT: 15.2 % (ref 11.5–14.5)
GFR SERPL CREATININE-BSD FRML MDRD: >150 ML/MIN/1.73
GLUCOSE BLD-MCNC: 130 MG/DL (ref 74–98)
HCT VFR BLD AUTO: 29.2 % (ref 37–47)
HGB BLD-MCNC: 9.6 G/DL (ref 12–16)
IMM GRANULOCYTES # BLD: 0.11 10*3/MM3 (ref 0–0.06)
IMM GRANULOCYTES NFR BLD: 0.9 % (ref 0–0.6)
LYMPHOCYTES # BLD AUTO: 1.28 10*3/MM3 (ref 0.6–3.4)
LYMPHOCYTES NFR BLD AUTO: 10.6 % (ref 10–50)
MCH RBC QN AUTO: 31.3 PG (ref 27–31)
MCHC RBC AUTO-ENTMCNC: 32.9 G/DL (ref 30–37)
MCV RBC AUTO: 95.1 FL (ref 81–99)
MONOCYTES # BLD AUTO: 0.65 10*3/MM3 (ref 0–0.9)
MONOCYTES NFR BLD AUTO: 5.4 % (ref 0–12)
NEUTROPHILS # BLD AUTO: 9.93 10*3/MM3 (ref 2–6.9)
NEUTROPHILS NFR BLD AUTO: 82.2 % (ref 37–80)
NRBC BLD MANUAL-RTO: 0 /100 WBC (ref 0–0)
PHOSPHATE SERPL-MCNC: 2.4 MG/DL (ref 2.5–4.5)
PLATELET # BLD AUTO: 388 10*3/MM3 (ref 130–400)
PMV BLD AUTO: 10.8 FL (ref 6–12)
POTASSIUM BLD-SCNC: 3.2 MMOL/L (ref 3.5–5.1)
RBC # BLD AUTO: 3.07 10*6/MM3 (ref 4.2–5.4)
SODIUM BLD-SCNC: 141 MMOL/L (ref 137–145)
WBC NRBC COR # BLD: 12.09 10*3/MM3 (ref 4.8–10.8)

## 2018-03-13 PROCEDURE — 99024 POSTOP FOLLOW-UP VISIT: CPT | Performed by: ORTHOPAEDIC SURGERY

## 2018-03-13 PROCEDURE — 25010000002 ENOXAPARIN PER 10 MG: Performed by: ORTHOPAEDIC SURGERY

## 2018-03-13 PROCEDURE — 25010000002 HYDROMORPHONE PER 4 MG: Performed by: INTERNAL MEDICINE

## 2018-03-13 PROCEDURE — 80069 RENAL FUNCTION PANEL: CPT | Performed by: INTERNAL MEDICINE

## 2018-03-13 PROCEDURE — 85025 COMPLETE CBC W/AUTO DIFF WBC: CPT | Performed by: INTERNAL MEDICINE

## 2018-03-13 RX ORDER — TRAMADOL HYDROCHLORIDE 50 MG/1
50 TABLET ORAL 4 TIMES DAILY
Status: DISCONTINUED | OUTPATIENT
Start: 2018-03-13 | End: 2018-03-14 | Stop reason: HOSPADM

## 2018-03-13 RX ADMIN — HYDROCODONE BITARTRATE AND ACETAMINOPHEN 1 TABLET: 5; 325 TABLET ORAL at 03:25

## 2018-03-13 RX ADMIN — HYDROMORPHONE HYDROCHLORIDE 0.5 MG: 10 INJECTION, SOLUTION INTRAMUSCULAR; INTRAVENOUS; SUBCUTANEOUS at 21:39

## 2018-03-13 RX ADMIN — ENOXAPARIN SODIUM 40 MG: 40 INJECTION SUBCUTANEOUS at 09:34

## 2018-03-13 RX ADMIN — POTASSIUM CHLORIDE 20 MEQ: 1500 TABLET, EXTENDED RELEASE ORAL at 17:13

## 2018-03-13 RX ADMIN — HYDROMORPHONE HYDROCHLORIDE 0.5 MG: 10 INJECTION, SOLUTION INTRAMUSCULAR; INTRAVENOUS; SUBCUTANEOUS at 06:17

## 2018-03-13 RX ADMIN — TRAMADOL HYDROCHLORIDE 50 MG: 50 TABLET, FILM COATED ORAL at 11:43

## 2018-03-13 RX ADMIN — HYDROCODONE BITARTRATE AND ACETAMINOPHEN 1 TABLET: 5; 325 TABLET ORAL at 09:40

## 2018-03-13 RX ADMIN — TRAMADOL HYDROCHLORIDE 50 MG: 50 TABLET, FILM COATED ORAL at 17:13

## 2018-03-13 RX ADMIN — LATANOPROST 1 DROP: 50 SOLUTION OPHTHALMIC at 20:58

## 2018-03-13 RX ADMIN — POTASSIUM CHLORIDE 20 MEQ: 1500 TABLET, EXTENDED RELEASE ORAL at 09:35

## 2018-03-13 NOTE — PLAN OF CARE
Problem: Pain, Acute (Adult)  Goal: Identify Related Risk Factors and Signs and Symptoms  Outcome: Outcome(s) achieved Date Met: 03/13/18

## 2018-03-13 NOTE — SIGNIFICANT NOTE
03/13/18 1008   Rehab Treatment   Discipline occupational therapist   Reason Treatment Not Performed patient/family declined treatment  (Pt states she is too nauseated.  c/o stomach pain, RN notified)

## 2018-03-13 NOTE — SIGNIFICANT NOTE
"   03/13/18 5835   Rehab Treatment   Discipline physical therapy assistant   Reason Treatment Not Performed patient/family declined treatment  (Pt reports ,\"I just can't.\" Therapy will f/u with pt at a later time )     "

## 2018-03-13 NOTE — PLAN OF CARE
Problem: Fall Risk (Adult)  Goal: Identify Related Risk Factors and Signs and Symptoms  Outcome: Ongoing (interventions implemented as appropriate)   03/13/18 0019   Fall Risk (Adult)   Related Risk Factors (Fall Risk) age-related changes;confusion/agitation;gait/mobility problems   Signs and Symptoms (Fall Risk) presence of risk factors     Goal: Absence of Fall  Outcome: Ongoing (interventions implemented as appropriate)   03/13/18 0019   Fall Risk (Adult)   Absence of Fall making progress toward outcome       Problem: Pain, Acute (Adult)  Goal: Identify Related Risk Factors and Signs and Symptoms  Outcome: Ongoing (interventions implemented as appropriate)   03/13/18 0019   Pain, Acute (Adult)   Related Risk Factors (Acute Pain) surgery;persistent pain   Signs and Symptoms (Acute Pain) guarding/abnormal posturing/positioning;verbalization of pain descriptors;facial mask of pain/grimace       Problem: Infection, Risk/Actual (Adult)  Goal: Identify Related Risk Factors and Signs and Symptoms  Outcome: Ongoing (interventions implemented as appropriate)   03/13/18 0019   Infection, Risk/Actual (Adult)   Related Risk Factors (Infection, Risk/Actual) age extremes;skin integrity impairment;surgery/procedure   Signs and Symptoms (Infection, Risk/Actual) pain     Goal: Infection Prevention/Resolution  Outcome: Ongoing (interventions implemented as appropriate)   03/13/18 0019   Infection, Risk/Actual (Adult)   Infection Prevention/Resolution making progress toward outcome       Problem: Skin Injury Risk (Adult)  Goal: Identify Related Risk Factors and Signs and Symptoms  Outcome: Ongoing (interventions implemented as appropriate)   03/13/18 0019   Skin Injury Risk (Adult)   Related Risk Factors (Skin Injury Risk) advanced age;cognitive impairment;infection;mobility impaired;medication     Goal: Skin Health and Integrity  Outcome: Ongoing (interventions implemented as appropriate)   03/13/18 0019   Skin Injury Risk (Adult)    Skin Health and Integrity making progress toward outcome       Problem: Fracture Orthopaedic (Adult)  Goal: Signs and Symptoms of Listed Potential Problems Will be Absent, Minimized or Managed (Fracture Orthopaedic)  Outcome: Ongoing (interventions implemented as appropriate)   03/13/18 0019   Goal/Outcome Evaluation   Problems Assessed (Orthopaedic Fracture) functional deficit/self-care deficit;pain;situational response;skin integrity impairment   Problems Present (Orthopaedic Fracture) functional deficit/self-care deficit;pain;situational response;skin integrity impairment

## 2018-03-13 NOTE — PROGRESS NOTES
Principal Problem:    Closed fracture of left distal femur  Active Problems:    Status post fall    Osteoarthritis    Essential hypertension          Subjective   She continues to have pain pointing to her left femur there been no reports of shortness of breath chest pain cough abdominal pain diarrhea or dysuria      Vital Signs  Temp:  [96.9 °F (36.1 °C)-99 °F (37.2 °C)] 99 °F (37.2 °C)  Heart Rate:  [67-75] 75  Resp:  [16-18] 18  BP: (120-142)/(65-80) 142/80    Physical Exam:   General Appearance:    Alert, cooperative, in no acute distress   Head:    Normocephalic, without obvious abnormality, atraumatic       Throat:      Neck:     Back:        Lungs:     Clear to auscultation and percussion with normal chest expansion     Heart:   Rhythm without murmur S3    Chest Wall:       Abdomen:        Extremities:      Pulses:      Skin:      Lymph nodes:      Neurologic:            Results Review:     I reviewed the patient's new clinical results.    Lab Results (last 24 hours)     Procedure Component Value Units Date/Time    Renal Function Panel [[id number]]  (Abnormal) Collected:  03/13/18 0953    Specimen:  Blood Updated:  03/13/18 1034     Glucose 130 (H) mg/dL      BUN 3 (L) mg/dL      Creatinine 0.30 (L) mg/dL      Sodium 141 mmol/L      Potassium 3.2 (L) mmol/L      Chloride 108 (H) mmol/L      CO2 21.0 (L) mmol/L      Calcium 8.3 (L) mg/dL      Albumin 2.90 (L) g/dL      Phosphorus 2.4 (L) mg/dL      Anion Gap 15.2 mmol/L      BUN/Creatinine Ratio 10.0     eGFR Non African Amer >150 mL/min/1.73     Narrative:       The MDRD GFR formula is only valid for adults with stable renal function between ages 18 and 70.    CBC & Differential [[id number]] Collected:  03/13/18 0954    Specimen:  Blood Updated:  03/13/18 1018    Narrative:       The following orders were created for panel order CBC & Differential.  Procedure                               Abnormality         Status                     ---------                                -----------         ------                     CBC Auto Differential[[id number]]        Abnormal            Final result                 Please view results for these tests on the individual orders.    CBC Auto Differential [[id number]]  (Abnormal) Collected:  03/13/18 0954    Specimen:  Blood Updated:  03/13/18 1018     WBC 12.09 (H) 10*3/mm3      RBC 3.07 (L) 10*6/mm3      Hemoglobin 9.6 (L) g/dL      Hematocrit 29.2 (L) %      MCV 95.1 fL      MCH 31.3 (H) pg      MCHC 32.9 g/dL      RDW 15.2 (H) %      RDW-SD 52.1 fl      MPV 10.8 fL      Platelets 388 10*3/mm3      Neutrophil % 82.2 (H) %      Lymphocyte % 10.6 %      Monocyte % 5.4 %      Eosinophil % 0.7 %      Basophil % 0.2 %      Immature Grans % 0.9 (H) %      Neutrophils, Absolute 9.93 (H) 10*3/mm3      Lymphocytes, Absolute 1.28 10*3/mm3      Monocytes, Absolute 0.65 10*3/mm3      Eosinophils, Absolute 0.09 10*3/mm3      Basophils, Absolute 0.03 10*3/mm3      Immature Grans, Absolute 0.11 (H) 10*3/mm3      nRBC 0.0 /100 WBC         Imaging Results (last 24 hours)     ** No results found for the last 24 hours. **            Abilio Garcia MD  03/13/18  4:32 PM

## 2018-03-13 NOTE — PLAN OF CARE
Problem: Fall Risk (Adult)  Goal: Absence of Fall  Outcome: Ongoing (interventions implemented as appropriate)

## 2018-03-14 VITALS
TEMPERATURE: 98 F | RESPIRATION RATE: 17 BRPM | OXYGEN SATURATION: 97 % | SYSTOLIC BLOOD PRESSURE: 144 MMHG | HEART RATE: 87 BPM | WEIGHT: 144.3 LBS | BODY MASS INDEX: 26.55 KG/M2 | HEIGHT: 62 IN | DIASTOLIC BLOOD PRESSURE: 78 MMHG

## 2018-03-14 PROBLEM — E87.6 HYPOKALEMIA: Status: ACTIVE | Noted: 2018-03-14

## 2018-03-14 PROCEDURE — 25010000002 ENOXAPARIN PER 10 MG: Performed by: ORTHOPAEDIC SURGERY

## 2018-03-14 PROCEDURE — 94799 UNLISTED PULMONARY SVC/PX: CPT

## 2018-03-14 RX ORDER — POTASSIUM CHLORIDE 20 MEQ/1
20 TABLET, EXTENDED RELEASE ORAL DAILY
Qty: 30 TABLET | Refills: 0 | Status: SHIPPED | OUTPATIENT
Start: 2018-03-14 | End: 2018-04-14

## 2018-03-14 RX ORDER — BISACODYL 10 MG
10 SUPPOSITORY, RECTAL RECTAL DAILY PRN
Qty: 60 SUPPOSITORY | Refills: 5 | Status: SHIPPED | OUTPATIENT
Start: 2018-03-14

## 2018-03-14 RX ORDER — HYDROCODONE BITARTRATE AND ACETAMINOPHEN 5; 325 MG/1; MG/1
1 TABLET ORAL EVERY 6 HOURS
Qty: 120 TABLET | Refills: 0 | Status: SHIPPED | OUTPATIENT
Start: 2018-03-14 | End: 2021-01-14 | Stop reason: HOSPADM

## 2018-03-14 RX ORDER — PSEUDOEPHEDRINE HCL 30 MG
100 TABLET ORAL 2 TIMES DAILY PRN
Qty: 60 CAPSULE | Refills: 5 | Status: SHIPPED | OUTPATIENT
Start: 2018-03-14

## 2018-03-14 RX ORDER — TRAMADOL HYDROCHLORIDE 50 MG/1
50 TABLET ORAL 4 TIMES DAILY
Qty: 120 TABLET | Refills: 0 | Status: SHIPPED | OUTPATIENT
Start: 2018-03-14 | End: 2021-01-14 | Stop reason: HOSPADM

## 2018-03-14 RX ADMIN — POTASSIUM CHLORIDE 20 MEQ: 1500 TABLET, EXTENDED RELEASE ORAL at 08:18

## 2018-03-14 RX ADMIN — ENOXAPARIN SODIUM 40 MG: 40 INJECTION SUBCUTANEOUS at 08:18

## 2018-03-14 RX ADMIN — TRAMADOL HYDROCHLORIDE 50 MG: 50 TABLET, FILM COATED ORAL at 12:41

## 2018-03-14 RX ADMIN — TRAMADOL HYDROCHLORIDE 50 MG: 50 TABLET, FILM COATED ORAL at 08:18

## 2018-03-14 NOTE — PLAN OF CARE
Problem: Fall Risk (Adult)  Goal: Absence of Fall  Outcome: Ongoing (interventions implemented as appropriate)      Problem: Pain, Acute (Adult)  Goal: Acceptable Pain Control/Comfort Level  Outcome: Ongoing (interventions implemented as appropriate)      Problem: Infection, Risk/Actual (Adult)  Goal: Identify Related Risk Factors and Signs and Symptoms  Outcome: Outcome(s) achieved Date Met: 03/14/18    Goal: Infection Prevention/Resolution  Outcome: Ongoing (interventions implemented as appropriate)      Problem: Skin Injury Risk (Adult)  Goal: Identify Related Risk Factors and Signs and Symptoms  Outcome: Outcome(s) achieved Date Met: 03/14/18    Goal: Skin Health and Integrity  Outcome: Ongoing (interventions implemented as appropriate)      Problem: Patient Care Overview  Goal: Plan of Care Review  Outcome: Ongoing (interventions implemented as appropriate)   03/14/18 0230   Coping/Psychosocial   Plan of Care Reviewed With patient   OTHER   Outcome Summary Pt continues to receive pain meds as needed. Receiving PT and OT as ordered.    Plan of Care Review   Progress no change     Goal: Individualization and Mutuality  Outcome: Ongoing (interventions implemented as appropriate)    Goal: Discharge Needs Assessment  Outcome: Ongoing (interventions implemented as appropriate)      Problem: Fracture Orthopaedic (Adult)  Goal: Signs and Symptoms of Listed Potential Problems Will be Absent, Minimized or Managed (Fracture Orthopaedic)  Outcome: Ongoing (interventions implemented as appropriate)

## 2018-03-14 NOTE — DISCHARGE PLACEMENT REQUEST
"MABEL Acuna RN  Case Management  Phone:  [phone]; Fax:  [phone]    Discharge summary.    Stefania Kirkpatrick (89 y.o. Female)     Date of Birth Social Security Number Address Home Phone MRN    09/06/1924  [address] [place], [place] [id number] [id number]    [employer] Marital Status          None        Admission Date Admission Type Admitting Provider Attending Provider Department, Room/Bed    3/8/18 Emergency Levy Charles MD Gillespie, Abilio Colindres MD [facility] MED SURG  4, 411/1    Discharge Date Discharge Disposition Discharge Destination         Skilled Nursing Facility (DC - External)              Attending Provider:  Abilio Garcia MD    Allergies:  Ampicillin, Sulfa Antibiotics    Isolation:  None   Infection:  None   Code Status:  Conditional    Ht:  157.5 cm (62\")   Wt:  65.5 kg (144 lb 4.8 oz)    Admission Cmt:  None   Principal Problem:  Closed fracture of left distal femur [S72.402A]                 Active Insurance as of 3/8/2018     Primary Coverage     Payor Plan Insurance Group Employer/Plan Group    MEDICARE MEDICARE A & B      Payor Plan Address Payor Plan Phone Number Effective From Effective To    [address] [phone] 9/1/1989     [place] [address]       Subscriber Name Subscriber Birth Date Member ID       STEFANIA KIRKPATRICK 9/6/1924 [id number]           Secondary Coverage     Payor Plan Insurance Group Employer/Plan Group    [place]      Payor Plan Address Payor Plan Phone Number Effective From Effective To    [address] [phone] 3/8/2018     [place] [address]       Subscriber Name Subscriber Birth Date Member ID       STEFANIA KIRKPATRICK 9/6/1924 [id number]                 Emergency Contacts      (Rel.) Home Phone Work Phone Mobile Phone    Alis Kirkpatrick (Daughter) [phone] -- --               Discharge Summary      Abilio Garcia MD at 3/14/2018 10:23 AM            Date of " Discharge:  3/14/2018    Discharge Diagnosis:     Patient Active Problem List   Diagnosis   • Closed fracture of left distal femur   • Status post fall   • Osteoarthritis   • Essential hypertension   • Hypokalemia         Hospital Course  Patient is a 89 y.o. female presented with A fall that she had sustained at the long-term nursing [facility] where she resides 1 week ago when she fell off her bed x-rays at the time did not reveal a fracture.  She continued to complain of pain in the left leg however and subsequent edges revealed a fracture of the distal femur.  He was therefore hospitalized and seen by orthopedic surgery Dr. Lyons.  She operative repair prepped after lesion was found to have hypokalemia which is being corrected she seemed to have continuous moaning in pain which prompted increases in her analgesic regimen some of her morning may be behavioral as well.  I spoke with her daughter regarding her advanced directives and we all agreed that a DO NOT RESUSCITATE status is recommended then that comfort measures for her should be emphasized.     Pertinent Test Results:  X-rays of the femur revealed a comminuted left femoral fracture and previous total knee arthroplasty.  Chest x-ray no acute cardiopulmonary process.  White blood cell count 6.07 hemoglobin 12.5 troponin less than 0.012, glucose 164 B1 14 creatinine is 0.6 sodium 142 potassium 3.5, on March 13 white blood cell count 12.09 hemoglobin 9.6 glucose 1:30 BUN 3 creatinine 0.3 sodium 141 potassium 3.2    Physical Exam:   Head appeared nontraumatic chest clear to auscultation percussion normal chest expansion cardiac regular rhythm without murmurs or Ervin off nontender bowel sounds normal there is tenderness in palpation of the left lower extremity    Discharge Disposition  Skilled Nursing Facility (DC - External)    Discharge Medications   Stefania Kirkpatrick   Home Medication Instructions DUSTIN:[id number]    Printed on:03/14/18 1023   Medication  Information                      amLODIPine (NORVASC) 5 MG tablet  Take 5 mg by mouth Daily.             bisacodyl (DULCOLAX) 10 MG suppository  Insert 1 suppository into the rectum Daily As Needed for Constipation.             Cholecalciferol (VITAMIN D) 2000 units tablet  Take 2,000 Units by mouth Daily.             docusate sodium 100 MG capsule  Take 100 mg by mouth 2 (Two) Times a Day As Needed for Constipation.             HYDROcodone-acetaminophen (NORCO) 5-325 MG per tablet  Take 1 tablet by mouth Every 6 (Six) Hours.             magnesium hydroxide (MILK OF MAGNESIA) 400 MG/5ML suspension  Take 30 mL by mouth Daily As Needed for Constipation (no BM in 3 days).             melatonin 3 MG tablet  Take 3 mg by mouth Every Night.             potassium chloride (K-DUR,KLOR-CON) 20 MEQ CR tablet  Take 1 tablet by mouth Daily for 31 days.             sertraline (ZOLOFT) 50 MG tablet  Take 50 mg by mouth Daily.             traMADol (ULTRAM) 50 MG tablet  Take 1 tablet by mouth 4 (Four) Times a Day.             travoprost, BAK free, (TRAVATAN) 0.004 % solution ophthalmic solution  1 drop Every Evening. in affected eye(s) at 2200                 Discharge Diet:   Diet Instructions     Diet: Soft Texture; Thin Liquids, No Restrictions; Ground       Discharge Diet:  Soft Texture    Fluid Consistency:  Thin Liquids, No Restrictions    Soft Options:  Ground          Activity at Discharge:   She is to be assisted to the chair at least 3 times per day additional wound and activity orders per Dr. Lyons.  A DO NOT RESUSCITATE status is recommended she is to have a CBC and renal panel in 2 weeks then resume previous laboratory monitoring.  She should be sitting upright with by mouth intake.  Fall prevention decubitus prevention and aspiration prevention measures should be employed.  She is to have OT PT ST screening and therapy is appropriate.    Follow-up Appointments  No future appointments.      Test Results Pending at  Discharge       Abilio Garcia MD  03/14/18  10:23 AM              Electronically signed by Abilio Garcia MD at 3/14/2018 10:29 AM

## 2018-03-14 NOTE — PROGRESS NOTES
Continued Stay Note   Chris     Patient Name: Stefania Kirkpatrick  MRN: [id number]  Today's Date: 3/14/2018    Admit Date: 3/8/2018          Discharge Plan     Row Name 03/14/18 1037       Plan    Plan [facility] LTC    Patient/Family in Agreement with Plan yes    Plan Comments Spoke to Esther with [facility] re pt is ready for discharge.  She states okay to accept.  Pt is LTC, but they may skill her after PT evaluates.  Esther will call after PT evaluation with update.  Report number is [phone], A/B unit.  Discharge summary has been efaxed to [phone].              Discharge Codes    No documentation.       Expected Discharge Date and Time     Expected Discharge Date Expected Discharge Time    Mar 14, 2018             Alis Conway

## 2018-03-14 NOTE — PROGRESS NOTES
Case Management Discharge Note         Destination     Service Request Status Selected Specialties Address Phone Number Fax Number    [facility] Accepted N/A [address], [place] [address] [phone]        Alis Conway 3/14/2018 1036    [facility] resident                 Durable Medical Equipment     No service coordination in this encounter.      Dialysis/Infusion     No service coordination in this encounter.      Home Medical Care     No service coordination in this encounter.      Social Care     No service coordination in this encounter.        Ambulance: [facility]    Final Discharge Disposition Code: 64 - nursing facility under Medicaid

## 2018-03-14 NOTE — PROGRESS NOTES
[facility]  PROGRESS NOTE    Name:  Stefania Kirkpatrick   Age:  89 y.o.  Sex:  female  :  1924  MRN:  [id number]   Visit Number:  [id number]  Admission Date:   18  Date Of Service:  18  Primary Care Physician:  Abilio Garcia MD     LOS: 4 days :  Patient Care Team:  Abilio Garcia MD as PCP - General:    Chief Complaint:      POD #4 Left distal femur retrograde nail for periprosthetic fracture.    Subjective / Interval History:     No acute menta status change, no respiratory distress, left thigh and knee pain is controlled.    Review of Systems:     General ROS: No fever spike, no chills or loss of consciousness.  Ophthalmic ROS: no acute visual disturbance.  ENT ROS: No sinus congestion or sore throat.   Respiratory ROS: No shortness of breath.   Cardiovascular ROS: No chest pain or palpitations.  Gastrointestinal ROS: No acute abdominal change. Non-distended.  Genito-Urinary ROS: No reported dysuria or hematuria.  Musculoskeletal ROS: No deep calf pain. No acute focal motor deficit  Neurological ROS: No cyanosis, no new numbness or tingling.  Dermatological ROS: No erythema.  No rash or pruritis.    Vital Signs:    Temp:  [96.9 °F (36.1 °C)-99 °F (37.2 °C)] 98.2 °F (36.8 °C)  Heart Rate:  [67-87] 87  Resp:  [16-18] 18  BP: (122-148)/(65-86) 148/86    Intake and output:    I/O last 3 completed shifts:  In: 2180 [P.O.:180; I.V.:2000]  Out: -   No intake/output data recorded.    Physical Examination:    General Appearance:    Alert and cooperative, no acute distress.   Head:    Atraumatic and normocephalic, without obvious abnormality.   Eyes:    PERRLA.  Extraocular movements are within normal limits.   ENT:   No acute change.   Neck:   Supple, trachea midline.   Lungs:     Normal respirations, unlabored.    Heart:    Regular rate.   Abdomen:     Soft non-tender, non-distended.   Extremities:   No new motor deficit, no calf pain, Brandan sign negative.   Skin:     No rash.   No cyanosis.  No erythema.  No active drainage.  Dressings clean and dry.   Neurologic:   Sensation grossly intact, pulses intact.     Laboratory results:    Lab Results (last 24 hours)     Procedure Component Value Units Date/Time    Renal Function Panel [[id number]]  (Abnormal) Collected:  03/13/18 0953    Specimen:  Blood Updated:  03/13/18 1034     Glucose 130 (H) mg/dL      BUN 3 (L) mg/dL      Creatinine 0.30 (L) mg/dL      Sodium 141 mmol/L      Potassium 3.2 (L) mmol/L      Chloride 108 (H) mmol/L      CO2 21.0 (L) mmol/L      Calcium 8.3 (L) mg/dL      Albumin 2.90 (L) g/dL      Phosphorus 2.4 (L) mg/dL      Anion Gap 15.2 mmol/L      BUN/Creatinine Ratio 10.0     eGFR Non African Amer >150 mL/min/1.73     Narrative:       The MDRD GFR formula is only valid for adults with stable renal function between ages 18 and 70.    CBC & Differential [[id number]] Collected:  03/13/18 0954    Specimen:  Blood Updated:  03/13/18 1018    Narrative:       The following orders were created for panel order CBC & Differential.  Procedure                               Abnormality         Status                     ---------                               -----------         ------                     CBC Auto Differential[[id number]]        Abnormal            Final result                 Please view results for these tests on the individual orders.    CBC Auto Differential [[id number]]  (Abnormal) Collected:  03/13/18 0954    Specimen:  Blood Updated:  03/13/18 1018     WBC 12.09 (H) 10*3/mm3      RBC 3.07 (L) 10*6/mm3      Hemoglobin 9.6 (L) g/dL      Hematocrit 29.2 (L) %      MCV 95.1 fL      MCH 31.3 (H) pg      MCHC 32.9 g/dL      RDW 15.2 (H) %      RDW-SD 52.1 fl      MPV 10.8 fL      Platelets 388 10*3/mm3      Neutrophil % 82.2 (H) %      Lymphocyte % 10.6 %      Monocyte % 5.4 %      Eosinophil % 0.7 %      Basophil % 0.2 %      Immature Grans % 0.9 (H) %      Neutrophils, Absolute 9.93 (H) 10*3/mm3      Lymphocytes,  Absolute 1.28 10*3/mm3      Monocytes, Absolute 0.65 10*3/mm3      Eosinophils, Absolute 0.09 10*3/mm3      Basophils, Absolute 0.03 10*3/mm3      Immature Grans, Absolute 0.11 (H) 10*3/mm3      nRBC 0.0 /100 WBC           I have reviewed the patient's laboratory results.    Radiology results:    Imaging Results (last 24 hours)     ** No results found for the last 24 hours. **          I have reviewed the patient's orthopaedic radiology imaging andreports.         Assessment/Plan    Principal Problem:    Closed fracture of left distal femur  Active Problems:    Status post fall    Osteoarthritis    Essential hypertension    POD #4 Left distal femur retrograde nail for periprosthetic fracture.    Continue current management per the detailed orders and instructions, including skin, safety and fall precautions, respiratory therapy with incentive spirometer, advance diet as tolerated, bowel regimen, multi-modal analgesia, multi-modal DVT prophylaxis, Hospitalist consult, PT/OT following post-surgical rehab protocol, and Case Management for discharge and rehabilitation plans.    Man Lyons MD  03/13/18  10:20 PM

## 2018-03-14 NOTE — DISCHARGE SUMMARY
Date of Discharge:  3/14/2018    Discharge Diagnosis:     Patient Active Problem List   Diagnosis   • Closed fracture of left distal femur   • Status post fall   • Osteoarthritis   • Essential hypertension   • Hypokalemia         Hospital Course  Patient is a 89 y.o. female presented with A fall that she had sustained at the long-term nursing [facility] where she resides 1 week ago when she fell off her bed x-rays at the time did not reveal a fracture.  She continued to complain of pain in the left leg however and subsequent edges revealed a fracture of the distal femur.  He was therefore hospitalized and seen by orthopedic surgery Dr. Lyons.  She operative repair prepped after lesion was found to have hypokalemia which is being corrected she seemed to have continuous moaning in pain which prompted increases in her analgesic regimen some of her morning may be behavioral as well.  I spoke with her daughter regarding her advanced directives and we all agreed that a DO NOT RESUSCITATE status is recommended then that comfort measures for her should be emphasized.     Pertinent Test Results:  X-rays of the femur revealed a comminuted left femoral fracture and previous total knee arthroplasty.  Chest x-ray no acute cardiopulmonary process.  White blood cell count 6.07 hemoglobin 12.5 troponin less than 0.012, glucose 164 B1 14 creatinine is 0.6 sodium 142 potassium 3.5, on March 13 white blood cell count 12.09 hemoglobin 9.6 glucose 1:30 BUN 3 creatinine 0.3 sodium 141 potassium 3.2    Physical Exam:   Head appeared nontraumatic chest clear to auscultation percussion normal chest expansion cardiac regular rhythm without murmurs or Ervin off nontender bowel sounds normal there is tenderness in palpation of the left lower extremity    Discharge Disposition  Skilled Nursing Facility (DC - External)    Discharge Medications   Stefania Kirkpatrick   Home Medication Instructions DUSTIN:[id number]    Printed on:03/14/18 1029    Medication Information                      amLODIPine (NORVASC) 5 MG tablet  Take 5 mg by mouth Daily.             bisacodyl (DULCOLAX) 10 MG suppository  Insert 1 suppository into the rectum Daily As Needed for Constipation.             Cholecalciferol (VITAMIN D) 2000 units tablet  Take 2,000 Units by mouth Daily.             docusate sodium 100 MG capsule  Take 100 mg by mouth 2 (Two) Times a Day As Needed for Constipation.             HYDROcodone-acetaminophen (NORCO) 5-325 MG per tablet  Take 1 tablet by mouth Every 6 (Six) Hours.             magnesium hydroxide (MILK OF MAGNESIA) 400 MG/5ML suspension  Take 30 mL by mouth Daily As Needed for Constipation (no BM in 3 days).             melatonin 3 MG tablet  Take 3 mg by mouth Every Night.             potassium chloride (K-DUR,KLOR-CON) 20 MEQ CR tablet  Take 1 tablet by mouth Daily for 31 days.             sertraline (ZOLOFT) 50 MG tablet  Take 50 mg by mouth Daily.             traMADol (ULTRAM) 50 MG tablet  Take 1 tablet by mouth 4 (Four) Times a Day.             travoprost, BAK free, (TRAVATAN) 0.004 % solution ophthalmic solution  1 drop Every Evening. in affected eye(s) at 2200                 Discharge Diet:   Diet Instructions     Diet: Soft Texture; Thin Liquids, No Restrictions; Ground       Discharge Diet:  Soft Texture    Fluid Consistency:  Thin Liquids, No Restrictions    Soft Options:  Ground          Activity at Discharge:   She is to be assisted to the chair at least 3 times per day additional wound and activity orders per Dr. Lyons.  A DO NOT RESUSCITATE status is recommended she is to have a CBC and renal panel in 2 weeks then resume previous laboratory monitoring.  She should be sitting upright with by mouth intake.  Fall prevention decubitus prevention and aspiration prevention measures should be employed.  She is to have OT PT ST screening and therapy is appropriate.    Follow-up Appointments  No future appointments.      Test Results  Pending at Discharge       Abilio Garcia MD  03/14/18  10:23 AM

## 2018-03-21 DIAGNOSIS — Z09 FOLLOW UP: Primary | ICD-10-CM

## 2018-03-22 ENCOUNTER — HOSPITAL ENCOUNTER (OUTPATIENT)
Dept: ULTRASOUND IMAGING | Facility: HOSPITAL | Age: 83
Discharge: HOME | End: 2018-03-22
Admitting: PHYSICIAN ASSISTANT

## 2018-03-22 ENCOUNTER — OFFICE VISIT (OUTPATIENT)
Dept: ORTHOPEDIC SURGERY | Facility: CLINIC | Age: 83
End: 2018-03-22

## 2018-03-22 VITALS
HEART RATE: 83 BPM | BODY MASS INDEX: 26.57 KG/M2 | OXYGEN SATURATION: 96 % | HEIGHT: 62 IN | WEIGHT: 144.4 LBS | RESPIRATION RATE: 16 BRPM

## 2018-03-22 DIAGNOSIS — I82.402 LEG DVT (DEEP VENOUS THROMBOEMBOLISM), ACUTE, LEFT (HCC): ICD-10-CM

## 2018-03-22 DIAGNOSIS — Z96.649 PERIPROSTHETIC FRACTURE OF FEMUR AT TIP OF PROSTHESIS, INITIAL ENCOUNTER: ICD-10-CM

## 2018-03-22 DIAGNOSIS — Z98.890 S/P ORIF (OPEN REDUCTION INTERNAL FIXATION) FRACTURE: ICD-10-CM

## 2018-03-22 DIAGNOSIS — M97.8XXA PERIPROSTHETIC FRACTURE OF FEMUR AT TIP OF PROSTHESIS, INITIAL ENCOUNTER: ICD-10-CM

## 2018-03-22 DIAGNOSIS — Z87.81 S/P ORIF (OPEN REDUCTION INTERNAL FIXATION) FRACTURE: ICD-10-CM

## 2018-03-22 DIAGNOSIS — M79.662 PAIN OF LEFT CALF: Primary | ICD-10-CM

## 2018-03-22 PROCEDURE — 93971 EXTREMITY STUDY: CPT

## 2018-03-22 PROCEDURE — 99024 POSTOP FOLLOW-UP VISIT: CPT | Performed by: PHYSICIAN ASSISTANT

## 2018-03-22 NOTE — PROGRESS NOTES
Subjective   Patient ID: Stefania Kirkpatrick is a 89 y.o.  female  Follow-up of the Left Femur (Open reduction and internal fixation of Left distal shaft femur periprosthetic fracture with an interlocked retrograde intramedullary nail on 3-9-18)         History of Present Illness  Patient is being seen for her initial postop visit in regards to left distal shaft femur ORIF.  The patient does appear to be baseline for her mental status.  She does persistent moaning.     Pain Location: Leg (left femur)  Pain Orientation: Left     Pain Descriptors: Aching  Pain Frequency: Constant/continuous                               Past Medical History:   Diagnosis Date   • Hypertension    • Hypertensive heart disease without CHF    • Hypoxemia    • Osteoarthritis    • Shoulder joint contracture, right         Past Surgical History:   Procedure Laterality Date   • JOINT REPLACEMENT     • TOTAL KNEE ARTHROPLASTY Bilateral        History reviewed. No pertinent family history.    Social History     Social History   • Marital status:      Spouse name: N/A   • Number of children: N/A   • Years of education: N/A     Occupational History   • Not on file.     Social History Main Topics   • Smoking status: Never Smoker   • Smokeless tobacco: Not on file   • Alcohol use No   • Drug use: No   • Sexual activity: No     Other Topics Concern   • Not on file     Social History Narrative   • No narrative on file       Allergies   Allergen Reactions   • Ampicillin Unknown (See Comments)     PT UNABLE TO RECALL REACTION     • Sulfa Antibiotics Unknown (See Comments)     PT UNABLE TO RECALL REACTION         Review of Systems   Constitutional: Negative for fever.   HENT: Negative for voice change.    Eyes: Negative for visual disturbance.   Respiratory: Negative for shortness of breath.    Cardiovascular: Negative for chest pain.   Gastrointestinal: Negative for abdominal distention and abdominal pain.   Genitourinary: Negative for dysuria.  "  Musculoskeletal: Positive for arthralgias. Negative for gait problem and joint swelling.   Skin: Negative for rash.   Neurological: Negative for speech difficulty.   Hematological: Does not bruise/bleed easily.   Psychiatric/Behavioral: Negative for confusion.   All other systems reviewed and are negative.      Objective   Pulse 83   Resp 16   Ht 157.5 cm (62.01\")   Wt 65.5 kg (144 lb 6.4 oz)   SpO2 96%   BMI 26.40 kg/m²    Physical Exam   Neck: No tracheal deviation present.   Pulmonary/Chest: Effort normal. No respiratory distress.   Musculoskeletal:        Left knee: She exhibits no swelling and no erythema.        Left ankle: She exhibits normal pulse. Achilles tendon exhibits no pain and no defect.   Neurological: She is alert.   Skin: No rash noted.   Psychiatric: Her behavior is normal.   Vitals reviewed.    Ortho Exam   Extremity DVT signs are Positive palpable cords   Neurologic Exam   Ortho Exam     The incision sites are clean and dry.   Staples are intact.         Assessment/Plan   Independent Review of Radiographic Studies:    Indication to evaluate fracture healing, and compared with prior imaging, shows interim fracture healing with good maintained reduction and alignment and intact position of fracture fixation hardware.      Procedures       Stefania was seen today for follow-up.    Diagnoses and all orders for this visit:    Pain of left calf  -     US Venous Doppler Lower Extremity Left (duplex)    S/P ORIF (open reduction internal fixation) fracture    Periprosthetic fracture of femur at tip of prosthesis, initial encounter    Leg DVT (deep venous thromboembolism), acute, left       Discussion of orthopedic goals  Risk, benefits, and merits of treatment alternatives reviewed with the patient and questions answered    Recommendations/Plan:  Patient is encouraged to call or return for any issues or concerns.    Staples removed today. Steri strips applied.     We'll send patient to stat " ultrasound left lower extremity.  If positive I will consult the rehabilitation physician.  Otherwise, we will have the patient return in 4 weeks.  She must continue wearing the knee brace.    ** I was notified by US the patient does have a positive LLE DVT in common femoral vein.   Dr. Garcia's nurse stated she would call in Eliquis and the patient could be sent back to nursing home.    I do not feel the patient needs admitted as she had NO respiratory distress.     Patient agreeable to call or return sooner for any concerns.

## 2018-04-18 DIAGNOSIS — Z09 FOLLOW UP: Primary | ICD-10-CM

## 2018-04-19 ENCOUNTER — OFFICE VISIT (OUTPATIENT)
Dept: ORTHOPEDIC SURGERY | Facility: CLINIC | Age: 83
End: 2018-04-19

## 2018-04-19 VITALS — HEIGHT: 62 IN | RESPIRATION RATE: 16 BRPM | BODY MASS INDEX: 26.57 KG/M2 | WEIGHT: 144.4 LBS

## 2018-04-19 DIAGNOSIS — Z98.890 S/P ORIF (OPEN REDUCTION INTERNAL FIXATION) FRACTURE: Primary | ICD-10-CM

## 2018-04-19 DIAGNOSIS — Z96.649 PERIPROSTHETIC FRACTURE OF FEMUR AT TIP OF PROSTHESIS, INITIAL ENCOUNTER: ICD-10-CM

## 2018-04-19 DIAGNOSIS — M97.8XXA PERIPROSTHETIC FRACTURE OF FEMUR AT TIP OF PROSTHESIS, INITIAL ENCOUNTER: ICD-10-CM

## 2018-04-19 DIAGNOSIS — Z87.81 S/P ORIF (OPEN REDUCTION INTERNAL FIXATION) FRACTURE: Primary | ICD-10-CM

## 2018-04-19 PROCEDURE — 99024 POSTOP FOLLOW-UP VISIT: CPT | Performed by: PHYSICIAN ASSISTANT

## 2018-04-19 NOTE — PROGRESS NOTES
Subjective   Patient ID: Stefania Kirkpatrick is a 89 y.o.  female  Follow-up and Post-op of the Left Femur (S/P ORIF (open reduction internal fixation) fracture/ /Periprosthetic fracture of femur at tip of prosthesis, initial encounter/ /Leg DVT (deep venous thromboembolism), acute, left)         History of Present Illness  Patient is following up for routine visit in regards to left distal femur periprosthetic fracture.  Patient currently denies chest pain or shortness of breath.  She states she has very little to no pain.     Pain Location: Leg (femur)  Pain Orientation: Left                                Result of Injury: Yes  Work-Related Injury: No    Past Medical History:   Diagnosis Date   • Hypertension    • Hypertensive heart disease without CHF    • Hypoxemia    • Osteoarthritis    • Shoulder joint contracture, right         Past Surgical History:   Procedure Laterality Date   • FEMUR IM NAILING RETROGRADE Left 3/9/2018    Procedure: FEMUR LEFT DISTAL PERIPROSTHETIC FRACTURE INTRAMEDULLARY NAILING RETROGRADE;  Surgeon: Man Lyons MD;  Location: [facility];  Service: Orthopedics   • JOINT REPLACEMENT     • TOTAL KNEE ARTHROPLASTY Bilateral        History reviewed. No pertinent family history.    Social History     Social History   • Marital status:      Spouse name: N/A   • Number of children: N/A   • Years of education: N/A     Occupational History   • Not on file.     Social History Main Topics   • Smoking status: Never Smoker   • Smokeless tobacco: Not on file   • Alcohol use No   • Drug use: No   • Sexual activity: No     Other Topics Concern   • Not on file     Social History Narrative   • No narrative on file       Allergies   Allergen Reactions   • Ampicillin Unknown (See Comments)     PT UNABLE TO RECALL REACTION     • Sulfa Antibiotics Unknown (See Comments)     PT UNABLE TO RECALL REACTION         Review of Systems   Constitutional: Negative for fever.   HENT: Negative for voice change.   "  Eyes: Negative for visual disturbance.   Respiratory: Negative for shortness of breath.    Cardiovascular: Negative for chest pain.   Gastrointestinal: Negative for abdominal distention and abdominal pain.   Genitourinary: Negative for dysuria.   Musculoskeletal: Positive for arthralgias. Negative for gait problem and joint swelling.   Skin: Negative for rash.   Neurological: Negative for speech difficulty.   Hematological: Does not bruise/bleed easily.   Psychiatric/Behavioral: Negative for confusion.   All other systems reviewed and are negative.      Objective   Resp 16   Ht 157.5 cm (62.01\")   Wt 65.5 kg (144 lb 6.4 oz)   BMI 26.40 kg/m²    Physical Exam   Constitutional: She is oriented to person, place, and time. She appears well-nourished.   Pulmonary/Chest: Effort normal. No respiratory distress.   Musculoskeletal:        Left knee: She exhibits swelling. She exhibits no effusion, no ecchymosis and no erythema. Tenderness found. Medial joint line tenderness noted.        Left ankle: No tenderness. No lateral malleolus and no medial malleolus tenderness found.   Neurological: She is alert and oriented to person, place, and time.   Skin: Capillary refill takes less than 2 seconds.   Psychiatric: She has a normal mood and affect.   Vitals reviewed.    Left Knee Exam     Other   Effusion: no effusion present           Extremity DVT signs are Negative on physical exam with no calf pain, with no increased pain with passive stretch/extension and with no skin tone change   Neurologic Exam     Mental Status   Oriented to person, place, and time.      Left Ankle Exam     Tenderness   The patient is experiencing tenderness in the no medial malleolus.    Left Knee Exam     Tenderness   The patient is experiencing tenderness in the medial joint line.         Patient is able to lift Left leg approx 30 degrees from stretcher      Good range of motion to bilateral feet.  Assessment/Plan   Independent Review of " Radiographic Studies:    Indication to evaluate fracture healing, and compared with prior imaging, shows interim fracture healing with good maintained reduction and alignment and intact position of fracture fixation hardware.      Procedures       Stefania was seen today for follow-up and post-op.    Diagnoses and all orders for this visit:    S/P ORIF (open reduction internal fixation) fracture    Periprosthetic fracture of femur at tip of prosthesis, initial encounter       Orthopedic activities reviewed and patient expressed appreciation  Discussion of orthopedic goals  Risk, benefits, and merits of treatment alternatives reviewed with the patient and questions answered    Recommendations/Plan:  Patient is encouraged to call or return for any issues or concerns.    Follow-up in 6 weeks and x-ray on arrival.  Please see the scan nursing home soap note for additional details  Patient agreeable to call or return sooner for any concerns.

## 2018-08-23 ENCOUNTER — APPOINTMENT (OUTPATIENT)
Dept: GENERAL RADIOLOGY | Facility: HOSPITAL | Age: 83
End: 2018-08-23

## 2018-08-23 ENCOUNTER — HOSPITAL ENCOUNTER (EMERGENCY)
Facility: HOSPITAL | Age: 83
Discharge: HOME | End: 2018-08-23
Attending: EMERGENCY MEDICINE | Admitting: EMERGENCY MEDICINE

## 2018-08-23 VITALS
BODY MASS INDEX: 26.68 KG/M2 | SYSTOLIC BLOOD PRESSURE: 132 MMHG | HEIGHT: 62 IN | HEART RATE: 57 BPM | RESPIRATION RATE: 18 BRPM | DIASTOLIC BLOOD PRESSURE: 65 MMHG | TEMPERATURE: 97.7 F | WEIGHT: 145 LBS | OXYGEN SATURATION: 91 %

## 2018-08-23 DIAGNOSIS — N30.00 ACUTE CYSTITIS WITHOUT HEMATURIA: Primary | ICD-10-CM

## 2018-08-23 DIAGNOSIS — R10.32 LEFT INGUINAL PAIN: ICD-10-CM

## 2018-08-23 LAB
BACTERIA UR QL AUTO: ABNORMAL /HPF
BILIRUB UR QL STRIP: NEGATIVE
CLARITY UR: ABNORMAL
COLOR UR: YELLOW
GLUCOSE UR STRIP-MCNC: NEGATIVE MG/DL
HGB UR QL STRIP.AUTO: ABNORMAL
HYALINE CASTS UR QL AUTO: ABNORMAL /LPF
KETONES UR QL STRIP: NEGATIVE
LEUKOCYTE ESTERASE UR QL STRIP.AUTO: ABNORMAL
NITRITE UR QL STRIP: NEGATIVE
PH UR STRIP.AUTO: 8.5 [PH] (ref 5–8)
PROT UR QL STRIP: ABNORMAL
RBC # UR: ABNORMAL /HPF
REF LAB TEST METHOD: ABNORMAL
SP GR UR STRIP: 1.02 (ref 1–1.03)
SQUAMOUS #/AREA URNS HPF: ABNORMAL /HPF
UROBILINOGEN UR QL STRIP: ABNORMAL
WBC UR QL AUTO: ABNORMAL /HPF

## 2018-08-23 PROCEDURE — 81001 URINALYSIS AUTO W/SCOPE: CPT | Performed by: PHYSICIAN ASSISTANT

## 2018-08-23 PROCEDURE — 72170 X-RAY EXAM OF PELVIS: CPT

## 2018-08-23 PROCEDURE — 87077 CULTURE AEROBIC IDENTIFY: CPT | Performed by: PHYSICIAN ASSISTANT

## 2018-08-23 PROCEDURE — 99283 EMERGENCY DEPT VISIT LOW MDM: CPT

## 2018-08-23 PROCEDURE — 87186 SC STD MICRODIL/AGAR DIL: CPT | Performed by: PHYSICIAN ASSISTANT

## 2018-08-23 PROCEDURE — 87086 URINE CULTURE/COLONY COUNT: CPT | Performed by: PHYSICIAN ASSISTANT

## 2018-08-23 PROCEDURE — 73552 X-RAY EXAM OF FEMUR 2/>: CPT

## 2018-08-23 RX ORDER — CEPHALEXIN 500 MG/1
500 CAPSULE ORAL 3 TIMES DAILY
Qty: 15 CAPSULE | Refills: 0 | Status: SHIPPED | OUTPATIENT
Start: 2018-08-23 | End: 2018-08-28

## 2018-08-23 NOTE — ED NOTES
Dispatch called at this time for non-emergent transfer back to [facility].      Amna Newman  08/23/18 2980

## 2018-08-23 NOTE — ED PROVIDER NOTES
Subjective   89-year-old female presents with left-sided pelvic pain.  She states that she is hurting in her pelvis and left groin area.  She is a recent history of a fall with femur fracture.  No known injury.  The pain is worse when she moves or touches the left upper leg and groin area.  No rash no redness no swelling.        History provided by:  Patient   used: No        Review of Systems   Musculoskeletal:        Left groin and pelvic pain   All other systems reviewed and are negative.      Past Medical History:   Diagnosis Date   • Hypertension    • Hypertensive heart disease without CHF    • Hypoxemia    • Osteoarthritis    • Shoulder joint contracture, right        Allergies   Allergen Reactions   • Ampicillin Unknown (See Comments)     PT UNABLE TO RECALL REACTION     • Sulfa Antibiotics Unknown (See Comments)     PT UNABLE TO RECALL REACTION         Past Surgical History:   Procedure Laterality Date   • FEMUR IM NAILING RETROGRADE Left 3/9/2018    Procedure: FEMUR LEFT DISTAL PERIPROSTHETIC FRACTURE INTRAMEDULLARY NAILING RETROGRADE;  Surgeon: Man Lyons MD;  Location: [facility];  Service: Orthopedics   • JOINT REPLACEMENT     • TOTAL KNEE ARTHROPLASTY Bilateral        History reviewed. No pertinent family history.    Social History     Social History   • Marital status:      Social History Main Topics   • Smoking status: Never Smoker   • Alcohol use No   • Drug use: No   • Sexual activity: No     Other Topics Concern   • Not on file           Objective   Physical Exam   Constitutional: She is oriented to person, place, and time. She appears well-developed and well-nourished.   Eyes: EOM are normal.   Neck: Normal range of motion. Neck supple.   Cardiovascular: Normal rate and regular rhythm.    Pulmonary/Chest: Effort normal and breath sounds normal.   Abdominal: Soft. Bowel sounds are normal.   Musculoskeletal: She exhibits tenderness.   There is to palpation in the  left groin area.  Decreased range of motion of the left leg with flexion.  No rash no redness no swelling   Neurological: She is alert and oriented to person, place, and time. She has normal reflexes.   Skin: Skin is warm and dry.   Psychiatric: She has a normal mood and affect.   Nursing note and vitals reviewed.      Procedures           ED Course  ED Course as of Aug 23 1509   Thu Aug 23, 2018   1504 Urine has multiple epithelial skin cells, also positive for leukoesterase and bacteria will cover with abx  [CS]      ED Course User Index  [CS] Sharif Neely Jr., ZACH                  [facility]      Final diagnoses:   Acute cystitis without hematuria   Left inguinal pain            Sharif Neely Jr., PA-C  08/23/18 1508

## 2018-08-27 LAB
BACTERIA SPEC AEROBE CULT: ABNORMAL
BACTERIA SPEC AEROBE CULT: ABNORMAL

## 2019-09-07 ENCOUNTER — HOSPITAL ENCOUNTER (EMERGENCY)
Facility: HOSPITAL | Age: 84
Discharge: HOME | End: 2019-09-07
Attending: EMERGENCY MEDICINE | Admitting: EMERGENCY MEDICINE

## 2019-09-07 ENCOUNTER — APPOINTMENT (OUTPATIENT)
Dept: GENERAL RADIOLOGY | Facility: HOSPITAL | Age: 84
End: 2019-09-07

## 2019-09-07 VITALS
OXYGEN SATURATION: 92 % | WEIGHT: 125 LBS | RESPIRATION RATE: 14 BRPM | TEMPERATURE: 98 F | BODY MASS INDEX: 22.86 KG/M2 | DIASTOLIC BLOOD PRESSURE: 84 MMHG | SYSTOLIC BLOOD PRESSURE: 182 MMHG | HEART RATE: 72 BPM

## 2019-09-07 DIAGNOSIS — M25.511 RIGHT SHOULDER PAIN, UNSPECIFIED CHRONICITY: ICD-10-CM
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
## 2019-09-07 PROCEDURE — 73060 X-RAY EXAM OF HUMERUS: CPT

## 2019-09-07 PROCEDURE — 73030 X-RAY EXAM OF SHOULDER: CPT

## 2019-09-07 PROCEDURE — 99283 EMERGENCY DEPT VISIT LOW MDM: CPT

## 2019-09-07 NOTE — ED PROVIDER NOTES
Subjective   89-year-old female presents to the ED with a chief complaint of fall. Pt is demented and extremely hard of hearing. Per care facility the patient is normally bed bound and does not walk but today she attempted to get up and ambulate and had a witness fall. She landed on her right side. Facility was concerned as she complained of some right shoulder pain. She did not hit her head. There was no LOC. Pt is pleasantly demented and resting comfortably but HPI and ROS is limited 2/2 to patients dementia.            Review of Systems   Reason unable to perform ROS: dementia.   Musculoskeletal: Positive for arthralgias.        Right shoulder pain.       Past Medical History:   Diagnosis Date   • Dementia    • Hypertension    • Hypertensive heart disease without CHF    • Hypoxemia    • Osteoarthritis    • Shoulder joint contracture, right        Allergies   Allergen Reactions   • Ampicillin Unknown (See Comments)     PT UNABLE TO RECALL REACTION     • Sulfa Antibiotics Unknown (See Comments)     PT UNABLE TO RECALL REACTION         Past Surgical History:   Procedure Laterality Date   • FEMUR IM NAILING RETROGRADE Left 3/9/2018    Procedure: FEMUR LEFT DISTAL PERIPROSTHETIC FRACTURE INTRAMEDULLARY NAILING RETROGRADE;  Surgeon: Man Lyons MD;  Location: [facility];  Service: Orthopedics   • JOINT REPLACEMENT     • TOTAL KNEE ARTHROPLASTY Bilateral        History reviewed. No pertinent family history.    Social History     Socioeconomic History   • Marital status:      Spouse name: Not on file   • Number of children: Not on file   • Years of education: Not on file   • Highest education level: Not on file   Tobacco Use   • Smoking status: Never Smoker   Substance and Sexual Activity   • Alcohol use: No   • Drug use: No   • Sexual activity: No           Objective   Physical Exam   Constitutional: No distress.   Elderly appearing.  Extremely hard of hearing.   HENT:   Head: Normocephalic and atraumatic.    Nose: Nose normal.   Eyes: Conjunctivae and EOM are normal.   Cardiovascular: Normal rate, regular rhythm and intact distal pulses.   Pulmonary/Chest: Effort normal and breath sounds normal. No respiratory distress.   Abdominal: Soft. She exhibits no distension. There is no tenderness. There is no guarding.   Musculoskeletal: She exhibits no edema or deformity.        Cervical back: She exhibits no tenderness.        Thoracic back: She exhibits no tenderness.        Lumbar back: She exhibits no tenderness.   Tenderness to palpation of right anterior shoulder.  No obvious deformity.  Distal pulses intact.   Neurological: She is alert. No cranial nerve deficit.   Oriented to self and place.  Pleasantly demented.   Skin: She is not diaphoretic.   Nursing note and vitals reviewed.      Procedures           ED Course  ED Course as of Sep 07 2324   Sat Sep 07, 2019   2037 X-ray shows old right humerus fracture and humeral head fractures.  This can be seen on the chest x-ray performed on 3/9/2018.  X-ray negative for further acute process.  Patient will be discharged.  [CG]      ED Course User Index  [CG] Steve Rey,                   [facility]    Final diagnoses:   Fall, initial encounter   Right shoulder pain, unspecified chronicity              Steve Rey,   09/07/19 3005

## 2021-01-03 ENCOUNTER — APPOINTMENT (OUTPATIENT)
Dept: GENERAL RADIOLOGY | Facility: HOSPITAL | Age: 86
End: 2021-01-03

## 2021-01-03 ENCOUNTER — HOSPITAL ENCOUNTER (EMERGENCY)
Facility: HOSPITAL | Age: 86
Discharge: NURSING, REHAB OR LONG-TERM CARE | End: 2021-01-03
Attending: EMERGENCY MEDICINE | Admitting: EMERGENCY MEDICINE

## 2021-01-03 VITALS
OXYGEN SATURATION: 96 % | SYSTOLIC BLOOD PRESSURE: 137 MMHG | BODY MASS INDEX: 28.97 KG/M2 | RESPIRATION RATE: 20 BRPM | HEIGHT: 56 IN | DIASTOLIC BLOOD PRESSURE: 69 MMHG | HEART RATE: 55 BPM | WEIGHT: 128.8 LBS | TEMPERATURE: 98.5 F

## 2021-01-03 DIAGNOSIS — S70.00XA CONTUSION OF HIP, UNSPECIFIED LATERALITY, INITIAL ENCOUNTER: Primary | ICD-10-CM
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
## 2021-01-03 PROCEDURE — 99283 EMERGENCY DEPT VISIT LOW MDM: CPT

## 2021-01-03 PROCEDURE — 71045 X-RAY EXAM CHEST 1 VIEW: CPT

## 2021-01-03 PROCEDURE — 72170 X-RAY EXAM OF PELVIS: CPT

## 2021-01-03 NOTE — ED NOTES
Attempted to call report to [facility]      Barbara Garcia, RN  01/03/21 0943

## 2021-01-03 NOTE — ED PROVIDER NOTES
Subjective   89-year-old demented nonambulatory female presents from her nursing home via EMS for chief complaint of fall.  Patient supposedly has fell out of her bed.  Apparently she has chronic hip pain but is complaining of worsening pain today.  Further history and review of systems is limited secondary to patient's mental status.          Review of Systems   Unable to perform ROS: Dementia       Past Medical History:   Diagnosis Date   • Chronic pain    • Dementia (CMS/HCC)    • Dysuria    • Hematuria    • Hypertension    • Hypertensive heart disease without CHF    • Hypoxemia    • Osteoarthritis    • Shoulder joint contracture, right        Allergies   Allergen Reactions   • Ampicillin Unknown (See Comments)     PT UNABLE TO RECALL REACTION     • Sulfa Antibiotics Unknown (See Comments)     PT UNABLE TO RECALL REACTION         Past Surgical History:   Procedure Laterality Date   • FEMUR IM NAILING RETROGRADE Left 3/9/2018    Procedure: FEMUR LEFT DISTAL PERIPROSTHETIC FRACTURE INTRAMEDULLARY NAILING RETROGRADE;  Surgeon: Man Lyons MD;  Location: [facility];  Service: Orthopedics   • JOINT REPLACEMENT     • TOTAL KNEE ARTHROPLASTY Bilateral        History reviewed. No pertinent family history.    Social History     Socioeconomic History   • Marital status:      Spouse name: Not on file   • Number of children: Not on file   • Years of education: Not on file   • Highest education level: Not on file   Tobacco Use   • Smoking status: Never Smoker   Substance and Sexual Activity   • Alcohol use: No   • Drug use: No   • Sexual activity: Never           Objective   Physical Exam  Vitals signs and nursing note reviewed.   Constitutional:       General: She is not in acute distress.     Appearance: She is well-developed. She is not diaphoretic.      Comments: Chronically ill-appearing.  Cachectic.   HENT:      Head: Normocephalic and atraumatic.      Nose: Nose normal.   Eyes:      Conjunctiva/sclera:  Conjunctivae normal.   Cardiovascular:      Rate and Rhythm: Normal rate and regular rhythm.   Pulmonary:      Effort: Pulmonary effort is normal. No respiratory distress.      Breath sounds: Normal breath sounds.   Abdominal:      General: There is no distension.      Palpations: Abdomen is soft.      Tenderness: There is no abdominal tenderness. There is no guarding.   Musculoskeletal:         General: No deformity.   Neurological:      Cranial Nerves: No cranial nerve deficit.      Comments: Disoriented.  Demented.         Procedures           ED Course                                           MDM  Imaging negative.  Appropriate for discharge.  Final diagnoses:   Contusion of hip, unspecified laterality, initial encounter   Fall, initial encounter            Steve Rey, DO  01/03/21 0941

## 2021-01-04 ENCOUNTER — APPOINTMENT (OUTPATIENT)
Dept: GENERAL RADIOLOGY | Facility: HOSPITAL | Age: 86
End: 2021-01-04

## 2021-01-04 ENCOUNTER — HOSPITAL ENCOUNTER (INPATIENT)
Facility: HOSPITAL | Age: 86
LOS: 10 days | Discharge: NURSING, REHAB OR LONG-TERM CARE | End: 2021-01-14
Attending: EMERGENCY MEDICINE | Admitting: INTERNAL MEDICINE

## 2021-01-04 DIAGNOSIS — S72.351A CLOSED DISPLACED COMMINUTED FRACTURE OF SHAFT OF RIGHT FEMUR, INITIAL ENCOUNTER (HCC): ICD-10-CM

## 2021-01-04 DIAGNOSIS — S72.491A OTHER CLOSED FRACTURE OF DISTAL END OF RIGHT FEMUR, INITIAL ENCOUNTER (HCC): ICD-10-CM

## 2021-01-04 DIAGNOSIS — G89.4 CHRONIC PAIN SYNDROME: ICD-10-CM

## 2021-01-04 DIAGNOSIS — Z91.81 STATUS POST FALL: ICD-10-CM

## 2021-01-04 DIAGNOSIS — F02.80 DEMENTIA IN ALZHEIMER'S DISEASE WITH EARLY ONSET (HCC): ICD-10-CM

## 2021-01-04 DIAGNOSIS — S72.401A CLOSED FRACTURE OF DISTAL END OF RIGHT FEMUR, UNSPECIFIED FRACTURE MORPHOLOGY, INITIAL ENCOUNTER (HCC): Primary | ICD-10-CM

## 2021-01-04 DIAGNOSIS — R62.7 ADULT FAILURE TO THRIVE SYNDROME: ICD-10-CM

## 2021-01-04 DIAGNOSIS — G30.0 DEMENTIA IN ALZHEIMER'S DISEASE WITH EARLY ONSET (HCC): ICD-10-CM

## 2021-01-04 DIAGNOSIS — R09.02 HYPOXIA: ICD-10-CM
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
## 2021-01-04 PROBLEM — R06.02 SOB (SHORTNESS OF BREATH): Status: ACTIVE | Noted: 2021-01-04

## 2021-01-04 PROBLEM — S72.301A FRACTURE OF SHAFT OF RIGHT FEMUR (HCC): Status: ACTIVE | Noted: 2021-01-04

## 2021-01-04 LAB
ALBUMIN SERPL-MCNC: 3.9 G/DL (ref 3.5–5.2)
ALBUMIN/GLOB SERPL: 1.4 G/DL
ALP SERPL-CCNC: 57 U/L (ref 39–117)
ALT SERPL W P-5'-P-CCNC: 11 U/L (ref 1–33)
ANION GAP SERPL CALCULATED.3IONS-SCNC: 10.8 MMOL/L (ref 5–15)
AST SERPL-CCNC: 18 U/L (ref 1–32)
BASOPHILS # BLD AUTO: 0.03 10*3/MM3 (ref 0–0.2)
BASOPHILS NFR BLD AUTO: 0.3 % (ref 0–1.5)
BILIRUB SERPL-MCNC: 1 MG/DL (ref 0–1.2)
BUN SERPL-MCNC: 22 MG/DL (ref 8–23)
BUN/CREAT SERPL: 33.3 (ref 7–25)
CALCIUM SPEC-SCNC: 9.1 MG/DL (ref 8.2–9.6)
CHLORIDE SERPL-SCNC: 101 MMOL/L (ref 98–107)
CO2 SERPL-SCNC: 24.2 MMOL/L (ref 22–29)
CREAT SERPL-MCNC: 0.66 MG/DL (ref 0.57–1)
D DIMER PPP FEU-MCNC: 3.22 MCGFEU/ML (ref 0–0.57)
DEPRECATED RDW RBC AUTO: 51 FL (ref 37–54)
EOSINOPHIL # BLD AUTO: 0 10*3/MM3 (ref 0–0.4)
EOSINOPHIL NFR BLD AUTO: 0 % (ref 0.3–6.2)
ERYTHROCYTE [DISTWIDTH] IN BLOOD BY AUTOMATED COUNT: 14.8 % (ref 12.3–15.4)
GFR SERPL CREATININE-BSD FRML MDRD: 83 ML/MIN/1.73
GLOBULIN UR ELPH-MCNC: 2.8 GM/DL
GLUCOSE SERPL-MCNC: 210 MG/DL (ref 65–99)
HCT VFR BLD AUTO: 33 % (ref 34–46.6)
HGB BLD-MCNC: 10.7 G/DL (ref 12–15.9)
HOLD SPECIMEN: NORMAL
HOLD SPECIMEN: NORMAL
IMM GRANULOCYTES # BLD AUTO: 0.06 10*3/MM3 (ref 0–0.05)
IMM GRANULOCYTES NFR BLD AUTO: 0.5 % (ref 0–0.5)
INR PPP: 1.19 (ref 0.9–1.1)
LYMPHOCYTES # BLD AUTO: 0.45 10*3/MM3 (ref 0.7–3.1)
LYMPHOCYTES NFR BLD AUTO: 4.1 % (ref 19.6–45.3)
MAGNESIUM SERPL-MCNC: 2.3 MG/DL (ref 1.7–2.3)
MCH RBC QN AUTO: 30.1 PG (ref 26.6–33)
MCHC RBC AUTO-ENTMCNC: 32.4 G/DL (ref 31.5–35.7)
MCV RBC AUTO: 93 FL (ref 79–97)
MONOCYTES # BLD AUTO: 0.59 10*3/MM3 (ref 0.1–0.9)
MONOCYTES NFR BLD AUTO: 5.4 % (ref 5–12)
NEUTROPHILS NFR BLD AUTO: 89.7 % (ref 42.7–76)
NEUTROPHILS NFR BLD AUTO: 9.81 10*3/MM3 (ref 1.7–7)
NRBC BLD AUTO-RTO: 0 /100 WBC (ref 0–0.2)
NT-PROBNP SERPL-MCNC: 579.3 PG/ML (ref 0–1800)
PHOSPHATE SERPL-MCNC: 2.9 MG/DL (ref 2.5–4.5)
PLATELET # BLD AUTO: 220 10*3/MM3 (ref 140–450)
PMV BLD AUTO: 10.2 FL (ref 6–12)
POTASSIUM SERPL-SCNC: 4 MMOL/L (ref 3.5–5.2)
PROT SERPL-MCNC: 6.7 G/DL (ref 6–8.5)
PROTHROMBIN TIME: 15.6 SECONDS (ref 12–15.1)
RBC # BLD AUTO: 3.55 10*6/MM3 (ref 3.77–5.28)
SARS-COV-2 RNA PNL SPEC NAA+PROBE: NOT DETECTED
SODIUM SERPL-SCNC: 136 MMOL/L (ref 136–145)
TROPONIN T SERPL-MCNC: <0.01 NG/ML (ref 0–0.03)
WBC # BLD AUTO: 10.94 10*3/MM3 (ref 3.4–10.8)
WHOLE BLOOD HOLD SPECIMEN: NORMAL
WHOLE BLOOD HOLD SPECIMEN: NORMAL

## 2021-01-04 PROCEDURE — 84484 ASSAY OF TROPONIN QUANT: CPT | Performed by: EMERGENCY MEDICINE

## 2021-01-04 PROCEDURE — 73552 X-RAY EXAM OF FEMUR 2/>: CPT

## 2021-01-04 PROCEDURE — 83880 ASSAY OF NATRIURETIC PEPTIDE: CPT | Performed by: EMERGENCY MEDICINE

## 2021-01-04 PROCEDURE — 87635 SARS-COV-2 COVID-19 AMP PRB: CPT | Performed by: EMERGENCY MEDICINE

## 2021-01-04 PROCEDURE — 93005 ELECTROCARDIOGRAM TRACING: CPT | Performed by: EMERGENCY MEDICINE

## 2021-01-04 PROCEDURE — 99222 1ST HOSP IP/OBS MODERATE 55: CPT | Performed by: INTERNAL MEDICINE

## 2021-01-04 PROCEDURE — 85610 PROTHROMBIN TIME: CPT | Performed by: EMERGENCY MEDICINE

## 2021-01-04 PROCEDURE — 80053 COMPREHEN METABOLIC PANEL: CPT | Performed by: EMERGENCY MEDICINE

## 2021-01-04 PROCEDURE — 83735 ASSAY OF MAGNESIUM: CPT | Performed by: INTERNAL MEDICINE

## 2021-01-04 PROCEDURE — 85025 COMPLETE CBC W/AUTO DIFF WBC: CPT | Performed by: EMERGENCY MEDICINE

## 2021-01-04 PROCEDURE — 99284 EMERGENCY DEPT VISIT MOD MDM: CPT

## 2021-01-04 PROCEDURE — 36415 COLL VENOUS BLD VENIPUNCTURE: CPT

## 2021-01-04 PROCEDURE — 84100 ASSAY OF PHOSPHORUS: CPT | Performed by: INTERNAL MEDICINE

## 2021-01-04 PROCEDURE — 85379 FIBRIN DEGRADATION QUANT: CPT | Performed by: INTERNAL MEDICINE

## 2021-01-04 PROCEDURE — 71045 X-RAY EXAM CHEST 1 VIEW: CPT

## 2021-01-04 RX ORDER — ACETAMINOPHEN 500 MG
500 TABLET ORAL EVERY 6 HOURS PRN
COMMUNITY

## 2021-01-04 RX ORDER — DOCUSATE SODIUM 100 MG/1
100 CAPSULE, LIQUID FILLED ORAL 2 TIMES DAILY PRN
Status: DISCONTINUED | OUTPATIENT
Start: 2021-01-04 | End: 2021-01-14 | Stop reason: HOSPADM

## 2021-01-04 RX ORDER — HYDROCODONE BITARTRATE AND ACETAMINOPHEN 5; 325 MG/1; MG/1
1 TABLET ORAL EVERY 6 HOURS
Status: DISCONTINUED | OUTPATIENT
Start: 2021-01-04 | End: 2021-01-05

## 2021-01-04 RX ORDER — ACETAMINOPHEN 325 MG/1
650 TABLET ORAL EVERY 6 HOURS PRN
Status: DISCONTINUED | OUTPATIENT
Start: 2021-01-04 | End: 2021-01-06

## 2021-01-04 RX ORDER — SODIUM CHLORIDE 0.9 % (FLUSH) 0.9 %
10 SYRINGE (ML) INJECTION AS NEEDED
Status: DISCONTINUED | OUTPATIENT
Start: 2021-01-04 | End: 2021-01-06

## 2021-01-04 RX ORDER — ONDANSETRON 2 MG/ML
4 INJECTION INTRAMUSCULAR; INTRAVENOUS EVERY 6 HOURS PRN
Status: DISCONTINUED | OUTPATIENT
Start: 2021-01-04 | End: 2021-01-06

## 2021-01-04 RX ORDER — LIDOCAINE 4 G/G
4 PATCH TOPICAL
COMMUNITY

## 2021-01-04 RX ORDER — LABETALOL HYDROCHLORIDE 5 MG/ML
10 INJECTION, SOLUTION INTRAVENOUS EVERY 6 HOURS PRN
Status: DISCONTINUED | OUTPATIENT
Start: 2021-01-04 | End: 2021-01-08

## 2021-01-04 RX ORDER — TRAMADOL HYDROCHLORIDE 50 MG/1
50 TABLET ORAL EVERY 6 HOURS PRN
Status: DISCONTINUED | OUTPATIENT
Start: 2021-01-04 | End: 2021-01-14 | Stop reason: HOSPADM

## 2021-01-04 RX ORDER — SODIUM CHLORIDE 0.9 % (FLUSH) 0.9 %
10 SYRINGE (ML) INJECTION EVERY 12 HOURS SCHEDULED
Status: DISCONTINUED | OUTPATIENT
Start: 2021-01-04 | End: 2021-01-14 | Stop reason: HOSPADM

## 2021-01-04 RX ORDER — BISACODYL 10 MG
10 SUPPOSITORY, RECTAL RECTAL DAILY PRN
Status: DISCONTINUED | OUTPATIENT
Start: 2021-01-04 | End: 2021-01-14 | Stop reason: HOSPADM

## 2021-01-04 RX ORDER — AMLODIPINE BESYLATE 5 MG/1
5 TABLET ORAL DAILY
Status: DISCONTINUED | OUTPATIENT
Start: 2021-01-05 | End: 2021-01-14 | Stop reason: HOSPADM

## 2021-01-04 RX ORDER — IPRATROPIUM BROMIDE AND ALBUTEROL SULFATE 2.5; .5 MG/3ML; MG/3ML
3 SOLUTION RESPIRATORY (INHALATION)
Status: DISCONTINUED | OUTPATIENT
Start: 2021-01-04 | End: 2021-01-05

## 2021-01-04 RX ADMIN — HYDROCODONE BITARTRATE AND ACETAMINOPHEN 1 TABLET: 5; 325 TABLET ORAL at 22:32

## 2021-01-04 RX ADMIN — SODIUM CHLORIDE, PRESERVATIVE FREE 10 ML: 5 INJECTION INTRAVENOUS at 22:33

## 2021-01-05 ENCOUNTER — APPOINTMENT (OUTPATIENT)
Dept: CT IMAGING | Facility: HOSPITAL | Age: 86
End: 2021-01-05

## 2021-01-05 LAB
ABO GROUP BLD: NORMAL
ANION GAP SERPL CALCULATED.3IONS-SCNC: 10.1 MMOL/L (ref 5–15)
BASOPHILS # BLD AUTO: 0.04 10*3/MM3 (ref 0–0.2)
BASOPHILS NFR BLD AUTO: 0.4 % (ref 0–1.5)
BLD GP AB SCN SERPL QL: NEGATIVE
BUN SERPL-MCNC: 23 MG/DL (ref 8–23)
BUN/CREAT SERPL: 36.5 (ref 7–25)
CALCIUM SPEC-SCNC: 9 MG/DL (ref 8.2–9.6)
CHLORIDE SERPL-SCNC: 105 MMOL/L (ref 98–107)
CO2 SERPL-SCNC: 24.9 MMOL/L (ref 22–29)
CREAT SERPL-MCNC: 0.63 MG/DL (ref 0.57–1)
DEPRECATED RDW RBC AUTO: 51.3 FL (ref 37–54)
EOSINOPHIL # BLD AUTO: 0.03 10*3/MM3 (ref 0–0.4)
EOSINOPHIL NFR BLD AUTO: 0.3 % (ref 0.3–6.2)
ERYTHROCYTE [DISTWIDTH] IN BLOOD BY AUTOMATED COUNT: 14.8 % (ref 12.3–15.4)
GFR SERPL CREATININE-BSD FRML MDRD: 88 ML/MIN/1.73
GLUCOSE SERPL-MCNC: 146 MG/DL (ref 65–99)
HCT VFR BLD AUTO: 32.2 % (ref 34–46.6)
HGB BLD-MCNC: 10.2 G/DL (ref 12–15.9)
IMM GRANULOCYTES # BLD AUTO: 0.05 10*3/MM3 (ref 0–0.05)
IMM GRANULOCYTES NFR BLD AUTO: 0.5 % (ref 0–0.5)
LYMPHOCYTES # BLD AUTO: 0.6 10*3/MM3 (ref 0.7–3.1)
LYMPHOCYTES NFR BLD AUTO: 6.1 % (ref 19.6–45.3)
MCH RBC QN AUTO: 29.8 PG (ref 26.6–33)
MCHC RBC AUTO-ENTMCNC: 31.7 G/DL (ref 31.5–35.7)
MCV RBC AUTO: 94.2 FL (ref 79–97)
MONOCYTES # BLD AUTO: 0.76 10*3/MM3 (ref 0.1–0.9)
MONOCYTES NFR BLD AUTO: 7.7 % (ref 5–12)
NEUTROPHILS NFR BLD AUTO: 8.39 10*3/MM3 (ref 1.7–7)
NEUTROPHILS NFR BLD AUTO: 85 % (ref 42.7–76)
NRBC BLD AUTO-RTO: 0 /100 WBC (ref 0–0.2)
PLATELET # BLD AUTO: 209 10*3/MM3 (ref 140–450)
PMV BLD AUTO: 10.9 FL (ref 6–12)
POTASSIUM SERPL-SCNC: 3.7 MMOL/L (ref 3.5–5.2)
RBC # BLD AUTO: 3.42 10*6/MM3 (ref 3.77–5.28)
RH BLD: POSITIVE
SODIUM SERPL-SCNC: 140 MMOL/L (ref 136–145)
T&S EXPIRATION DATE: NORMAL
WBC # BLD AUTO: 9.87 10*3/MM3 (ref 3.4–10.8)

## 2021-01-05 PROCEDURE — 94640 AIRWAY INHALATION TREATMENT: CPT

## 2021-01-05 PROCEDURE — 25010000002 IOPAMIDOL 61 % SOLUTION: Performed by: FAMILY MEDICINE

## 2021-01-05 PROCEDURE — S0260 H&P FOR SURGERY: HCPCS | Performed by: ORTHOPAEDIC SURGERY

## 2021-01-05 PROCEDURE — 80048 BASIC METABOLIC PNL TOTAL CA: CPT | Performed by: INTERNAL MEDICINE

## 2021-01-05 PROCEDURE — 71275 CT ANGIOGRAPHY CHEST: CPT

## 2021-01-05 PROCEDURE — 94799 UNLISTED PULMONARY SVC/PX: CPT

## 2021-01-05 PROCEDURE — 86900 BLOOD TYPING SEROLOGIC ABO: CPT | Performed by: ORTHOPAEDIC SURGERY

## 2021-01-05 PROCEDURE — 86901 BLOOD TYPING SEROLOGIC RH(D): CPT | Performed by: ORTHOPAEDIC SURGERY

## 2021-01-05 PROCEDURE — 99232 SBSQ HOSP IP/OBS MODERATE 35: CPT | Performed by: FAMILY MEDICINE

## 2021-01-05 PROCEDURE — 86850 RBC ANTIBODY SCREEN: CPT | Performed by: ORTHOPAEDIC SURGERY

## 2021-01-05 PROCEDURE — 85025 COMPLETE CBC W/AUTO DIFF WBC: CPT | Performed by: INTERNAL MEDICINE

## 2021-01-05 RX ORDER — HYDROCODONE BITARTRATE AND ACETAMINOPHEN 5; 325 MG/1; MG/1
1 TABLET ORAL EVERY 6 HOURS PRN
Status: DISCONTINUED | OUTPATIENT
Start: 2021-01-05 | End: 2021-01-14 | Stop reason: HOSPADM

## 2021-01-05 RX ORDER — CLINDAMYCIN PHOSPHATE 900 MG/50ML
900 INJECTION, SOLUTION INTRAVENOUS ONCE
Status: COMPLETED | OUTPATIENT
Start: 2021-01-05 | End: 2021-01-05

## 2021-01-05 RX ORDER — HYDROCODONE BITARTRATE AND ACETAMINOPHEN 10; 325 MG/1; MG/1
1 TABLET ORAL EVERY 6 HOURS PRN
COMMUNITY
End: 2021-01-14 | Stop reason: HOSPADM

## 2021-01-05 RX ADMIN — IPRATROPIUM BROMIDE AND ALBUTEROL SULFATE 3 ML: .5; 3 SOLUTION RESPIRATORY (INHALATION) at 06:51

## 2021-01-05 RX ADMIN — SERTRALINE HYDROCHLORIDE 50 MG: 50 TABLET ORAL at 08:42

## 2021-01-05 RX ADMIN — SODIUM CHLORIDE, PRESERVATIVE FREE 10 ML: 5 INJECTION INTRAVENOUS at 21:38

## 2021-01-05 RX ADMIN — IOPAMIDOL 100 ML: 612 INJECTION, SOLUTION INTRAVENOUS at 12:15

## 2021-01-05 RX ADMIN — AMLODIPINE BESYLATE 5 MG: 5 TABLET ORAL at 08:42

## 2021-01-05 RX ADMIN — CLINDAMYCIN PHOSPHATE 900 MG: 900 INJECTION, SOLUTION INTRAVENOUS at 16:24

## 2021-01-05 RX ADMIN — SODIUM CHLORIDE, PRESERVATIVE FREE 10 ML: 5 INJECTION INTRAVENOUS at 08:42

## 2021-01-05 NOTE — ED PROVIDER NOTES
Subjective   89-year-old female presenting with reported femur fracture.  Patient has dementia and is essentially nonverbal so cannot provide any history.  Per report she fell a couple days ago and was seen here in the ED and had negative x-rays.  Apparently she continued to complain of pain so repeat x-rays were obtained at the nursing home and she was diagnosed with a distal right femur fracture.  No other history able to be obtained or provided.          Review of Systems   Unable to perform ROS: Dementia       Past Medical History:   Diagnosis Date   • Chronic pain    • Dementia (CMS/HCC)    • Dysuria    • Hematuria    • Hypertension    • Hypertensive heart disease without CHF    • Hypoxemia    • Osteoarthritis    • Shoulder joint contracture, right        Allergies   Allergen Reactions   • Ampicillin Unknown (See Comments)     PT UNABLE TO RECALL REACTION     • Sulfa Antibiotics Unknown (See Comments)     PT UNABLE TO RECALL REACTION         Past Surgical History:   Procedure Laterality Date   • FEMUR IM NAILING RETROGRADE Left 3/9/2018    Procedure: FEMUR LEFT DISTAL PERIPROSTHETIC FRACTURE INTRAMEDULLARY NAILING RETROGRADE;  Surgeon: Man Lyons MD;  Location: [facility];  Service: Orthopedics   • JOINT REPLACEMENT     • TOTAL KNEE ARTHROPLASTY Bilateral        History reviewed. No pertinent family history.    Social History     Socioeconomic History   • Marital status:      Spouse name: Not on file   • Number of children: Not on file   • Years of education: Not on file   • Highest education level: Not on file   Tobacco Use   • Smoking status: Never Smoker   Substance and Sexual Activity   • Alcohol use: No   • Drug use: No   • Sexual activity: Never           Objective   Physical Exam  Constitutional:       General: She is not in acute distress.     Appearance: She is not toxic-appearing or diaphoretic.      Comments: Chronically ill-appearing   HENT:      Head: Normocephalic and atraumatic.       Right Ear: External ear normal.      Left Ear: External ear normal.      Nose: Nose normal.      Mouth/Throat:      Mouth: Mucous membranes are moist.      Pharynx: Oropharynx is clear.   Eyes:      Extraocular Movements: Extraocular movements intact.      Conjunctiva/sclera: Conjunctivae normal.      Pupils: Pupils are equal, round, and reactive to light.   Neck:      Musculoskeletal: Normal range of motion.   Cardiovascular:      Rate and Rhythm: Normal rate and regular rhythm.      Pulses: Normal pulses.      Heart sounds: Normal heart sounds.   Pulmonary:      Effort: Pulmonary effort is normal. No respiratory distress.      Breath sounds: Normal breath sounds.   Abdominal:      General: Bowel sounds are normal. There is no distension.      Tenderness: There is no abdominal tenderness.   Musculoskeletal:      Comments: Swelling and tenderness to the right distal thigh, all other extremities and joints are stable without tenderness   Skin:     General: Skin is warm and dry.      Capillary Refill: Capillary refill takes less than 2 seconds.      Findings: No rash.   Neurological:      General: No focal deficit present.      Mental Status: She is alert.      Comments: Essentially nonverbal, grossly normal strength and sensation   Psychiatric:      Comments: Unable to assess         Procedures           ED Course                                           MDM  Number of Diagnoses or Management Options  Closed fracture of distal end of right femur, unspecified fracture morphology, initial encounter (CMS/[facility]):   Hypoxia:   Diagnosis management comments: 89-year-old female with reported femur fracture.  Chronically ill-appearing elderly lady in no distress with exam as above.  Notably she was hypoxic on arrival here, unsure if this is due to her taking pain medication prior to arrival or some other etiology.  Will obtain x-ray of the leg as well as basic labs and chest x-ray.  We will continue to monitor.  Disposition  pending anticipate admission.    DDx: Fracture, pneumonia, electrolyte abnormality    EKG interpreted by me: Sinus rhythm, first-degree AVB, occasional CBC, RBBB, LAFB, no obvious acute ST changes, this is an abnormal EKG    Xray does confirm distal right femur fracture. Otherwise work up fairly unremarkable. She does note  Discussed with Dr Lyons, will consult. Discussed with Dr Mauricio who graciously accepts for admission.       Amount and/or Complexity of Data Reviewed  Decide to obtain previous medical records or to obtain history from someone other than the patient: yes        Final diagnoses:   Closed fracture of distal end of right femur, unspecified fracture morphology, initial encounter (CMS/[facility])   Hypoxia            Miguel Fontenot MD  01/04/21 2053

## 2021-01-05 NOTE — NURSING NOTE
Spoke with daughter Alis Kirkpatrick consent for mother to have a CTA done this morning was obtained with 2nd nurse verification.

## 2021-01-05 NOTE — ED NOTES
Called Dr. Lyons and left a voicemail for him to call back per Dr. Fontenot.          Kayla Pichardo  01/04/21 2030

## 2021-01-05 NOTE — ED NOTES
Pt sleeping. 85% on RA. Pt placed on 6LNC. Pt shaken and to voice. Did not stir. Pt moved up in bed. Pt yelled out c/o leg pain. Pointed to left. Could not specify. SPO2 decreased to 4LNC. Pt 98%.      Iris Moyer, RN  01/04/21 1925

## 2021-01-05 NOTE — THERAPY EVALUATION
Hold PT eval pending ortho consult, r/o PE and possible surgical intervention. PT will follow up with patient tomorrow and proceed with eval as tolerated and appropriate.

## 2021-01-05 NOTE — PLAN OF CARE
Goal Outcome Evaluation:  Plan of Care Reviewed With: patient  Progress: no change   Pt has slept most of day. Combative at times and does not like to be bothered. No acute changes to report. Going to surgery tomorrow. Will continue to monitor.

## 2021-01-05 NOTE — PROGRESS NOTES
[facility]    Name:  Stefania Kirkpatrick   Age:  89 y.o.  Sex:  female  :  1924  MRN:  [id number]   Visit Number:  [id number]  Admission Date:  2021  Date Of Service:  21  Primary Care Physician:  Abilio Garcia MD     LOS: 1 day :  Patient Care Team:  Abilio Garcia MD as PCP - General:    Chief Complaint:      Follow-up on hip fracture    Subjective / Interval History:     Patient seen and examined at bedside this morning.  Resting overall, did get agitated with palpation of her hip.  Speech is somewhat difficult to understand.  No family was at bedside.  Definitely has dementia with disorientation.    Review of Systems:     Complete review of systems unobtainable secondary to current condition    Vital Signs:    Temp:  [97.9 °F (36.6 °C)-99.5 °F (37.5 °C)] 97.9 °F (36.6 °C)  Heart Rate:  [60-86] 64  Resp:  [14-20] 18  BP: (106-140)/(59-78) 114/62    Intake and output:    No intake/output data recorded.  I/O this shift:  In: 40 [P.O.:40]  Out: -     Physical Examination:    General Appearance:   Frail appearing elderly female.  Confused.   Head:  Atraumatic and normocephalic, without obvious abnormality.   Eyes:          PERRLA, conjunctivae and sclerae normal, no Icterus. No pallor.    Neck: Supple, trachea midline, no thyromegaly.   Lungs:   Breath sounds heard bilaterally equally.  No crackles or wheezing. No Pleural rub or bronchial breathing.   Heart:  Normal S1 and S2, no murmur, no gallop, no rub. No JVD   Abdomen:   Normal bowel sounds, no masses, no organomegaly. Soft, non-tender, non-distended, no guarding, no rebound tenderness.   Extremities:  Tenderness to palpation anterior laterally of the right thigh/hip region.  No bruising.  No edema, no cyanosis, no clubbing.   Skin: No bleeding, bruising or rash.   Neurologic:  Is arousable.  Disoriented.     Laboratory results:    Results from last 7 days   Lab Units 21  0659  01/04/21 2007   SODIUM mmol/L 140 136   POTASSIUM mmol/L 3.7 4.0   CHLORIDE mmol/L 105 101   CO2 mmol/L 24.9 24.2   BUN mg/dL 23 22   CREATININE mg/dL 0.63 0.66   CALCIUM mg/dL 9.0 9.1   BILIRUBIN mg/dL  --  1.0   ALK PHOS U/L  --  57   ALT (SGPT) U/L  --  11   AST (SGOT) U/L  --  18   GLUCOSE mg/dL 146* 210*     Results from last 7 days   Lab Units 01/05/21  0616 01/04/21 2007   WBC 10*3/mm3 9.87 10.94*   HEMOGLOBIN g/dL 10.2* 10.7*   HEMATOCRIT % 32.2* 33.0*   PLATELETS 10*3/mm3 209 220     Results from last 7 days   Lab Units 01/04/21 2007   INR  1.19*     Results from last 7 days   Lab Units 01/04/21 2007   TROPONIN T ng/mL <0.010               I have reviewed the patient's laboratory results.    Radiology results:    Imaging Results (Last 24 Hours)     Procedure Component Value Units Date/Time    XR Chest 1 View [[id number]] Collected: 01/05/21 0958     Updated: 01/05/21 1215    Narrative:      PROCEDURE: XR CHEST 1 VW-        HISTORY: fall, hypoxia; S72.401A-Unspecified fracture of lower end of  right femur, initial encounter for closed fracture; R09.02-Hypoxemia     COMPARISON: January 3, 2021.     FINDINGS: The heart is normal in size. The mediastinum is unremarkable.  There are chronic lung changes. There is no pneumothorax. There are no  acute osseous abnormalities. There are diffuse calcifications.       Impression:      No acute cardiopulmonary process.                       Images were reviewed, interpreted, and dictated by Dr. Ross Bueno M.D.  Transcribed by Iona Gonzalez PA-C.     This report was finalized on 1/5/2021 12:12 PM by Ross Bueno M.D..    XR Femur 2 View Right [[id number]] Collected: 01/05/21 0959     Updated: 01/05/21 1215    Narrative:      PROCEDURE: XR FEMUR 2 VW RIGHT-     HISTORY: reported distal femur fracture     COMPARISON: None.     FINDINGS:  A 2 view exam demonstrates right hip hemiarthroplasty. There  are postsurgical changes of total right knee  arthroplasty. There is a  comminuted fracture of the distal femoral diaphysis with dorsal  angulation.       Impression:      Comminuted fracture of the distal femoral diaphysis with  dorsal angulation.            Images were reviewed, interpreted, and dictated by Dr. Ross Bueno M.D.  Transcribed by Iona Gonzalez PA-C.     This report was finalized on 1/5/2021 12:12 PM by Ross Bueno M.D..    CT Chest Pulmonary Embolism [[id number]] Collected: 01/05/21 1135     Updated: 01/05/21 1139    Narrative:      PROCEDURE: CT CHEST PULMONARY EMBOLISM-     HISTORY: PE suspected, low/intermediate prob, positive D-dimer;  S72.401A-Unspecified fracture of lower end of right femur, initial  encounter for closed fracture; R09.02-Hypoxemia; Z91.81-History of  falling; G30.0-Alzheimer's disease with early onset; F02.80-Dementia in  other diseases classified elsewhere without behavioral disturbance;  S72.491A-Other fracture of lower end of right femur, initial encounter     COMPARISON: None .     TECHNIQUE: Multiple axial CT images were obtained from the thoracic  inlet through the upper abdomen following the administration of Isovue  300 per the CT PE protocol. Coronal and oblique 3D MIP images were  reconstructed from the original axial data set.      FINDINGS: There are no filling defects within the pulmonary arteries to  suggest an embolus. The thoracic aorta is normal in caliber with no  evidence of dissection. The heart is normal in size. There are no  pleural or pericardial effusions. There is no adenopathy. Lung windows  reveal atelectasis in the left greater than right lung bases. Within the  visualized upper abdomen there is the suggestion of gallstones. The  upper abdomen is otherwise unremarkable. Bone windows reveal no acute  osseous abnormalities.       Impression:      1. No evidence of pulmonary embolus or dissection.  2. Bibasilar atelectasis.  3. Findings suggesting gallstones.           367.26  mGy.cm        This study was performed with techniques to keep radiation doses as low  as reasonably achievable (ALARA). Individualized dose reduction  techniques using automated exposure control or adjustment of mA and/or  kV according to the patient size were employed.      This report was finalized on 1/5/2021 11:37 AM by Ross Bueno M.D..          I have reviewed the patient's radiology reports.    Medication Review:     I have reviewed the patient's active and prn medications.       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  Closed fracture of distal end of right femur  2.  Status post fall  3.  Essential hypertension  4  Alzheimer's dementia  5.  Atelectasis    Plan:    Patient's vitals reviewed and overall stable.  CT PE protocol with no evidence of PE, some bibasilar atelectasis otherwise unremarkable.  A.m. labs are overall stable with a hemoglobin of 10 creatinine of 0.63.  Orthopedics consulted.  Likely plan on operative intervention tomorrow.  Continue with as needed pain control and antiemetics.  N.p.o. after midnight.  VTE prophylaxis postoperatively.  Patient is a DNR/DNI.    Anticipate patient will be able to be discharged back to prior living arrangements in nursing home facility upon completion of hospitalization.    Patient is a patient of Dr. Garcia.  He is out until Thursday.  I did notify his office of her admission.  We will continue to see the patient.    Stefani Astudillo DO  01/05/21  14:27 EST    Dictated utilizing Dragon dictation.

## 2021-01-05 NOTE — ED NOTES
Dr. Lyons called back at this time. Call transferred to Dr. Fontenot.      Kayla Pichardo  01/04/21 2031

## 2021-01-05 NOTE — H&P
[facility]   HISTORY AND PHYSICAL    Patient Name: Stefania Kirkpatrick  : 1924  MRN: [id number]  Primary Care Physician: Abilio Garcia MD  Date of admission: 2021      Subjective   Subjective     Chief Complaint:  Fall leg pain     HPI:  Stefania Kirkpatrick is a 89 y.o. female with PMh of femur fracture left, HTN, OA, fall history who presented from NH to the ED  After a fall. She was also recently seen for a fall several days ago. She is a poor historian. She has a   distal right femur fracture. NO fever. She did have episode of hypoxia in the ED. No cp. Ortho was called and made aware and they will see her in AM. She was also screened for covid. No cough. No cp. Chest xray did not appear pneumonia.     Location: right femur  quality: severity: moderate  duration: timing:acute  associated: fall confusion  Mod.factor:none     Patient denied fever, chills, sore throat, ear ache, HA, sneezing/tearing from eyes, cough, CP, back pain, abdominal pain, V/D, rash, or bruising.      No alleviating or aggravating Factors.  Data and labs personally  reviewed. Patient is acutely ill and needs hospitalization. Patient  will require at least  one to two midnight stays due to illness.  Patient needs Further work up and acute inpatient monitoring.       Review of Systems  All other systems were reviewed and were negative. Except above mentioned in HPI. Cardio: no palpitations GI: No heart Burn, no constipation  HEME: no bruising Urinary: no frequency, no incontinence  END: No heat intolerance, no sweating      Personal History     Past Medical History:   Diagnosis Date   • Chronic pain    • Dementia (CMS/HCC)    • Dysuria    • Hematuria    • Hypertension    • Hypertensive heart disease without CHF    • Hypoxemia    • Osteoarthritis    • Shoulder joint contracture, right        Past Surgical History:   Procedure Laterality Date   • FEMUR IM NAILING RETROGRADE Left 3/9/2018    Procedure: FEMUR LEFT DISTAL PERIPROSTHETIC  FRACTURE INTRAMEDULLARY NAILING RETROGRADE;  Surgeon: Man Lyons MD;  Location: [facility];  Service: Orthopedics   • JOINT REPLACEMENT     • TOTAL KNEE ARTHROPLASTY Bilateral        Family History: family history is not on file. Otherwise pertinent FHx was reviewed and not pertinent to current issue.    Social History:  reports that she has never smoked. She does not have any smokeless tobacco history on file. She reports that she does not drink alcohol or use drugs.    Home Medications:  HYDROcodone-acetaminophen, Vitamin D, amLODIPine, bisacodyl, docusate sodium, magnesium hydroxide, melatonin, sertraline, traMADol, and travoprost (KIRAN free)      Allergies:  Allergies   Allergen Reactions   • Ampicillin Unknown (See Comments)     PT UNABLE TO RECALL REACTION     • Sulfa Antibiotics Unknown (See Comments)     PT UNABLE TO RECALL REACTION         Objective   Objective     Vitals:  Temp:  [99.5 °F (37.5 °C)] 99.5 °F (37.5 °C)  Heart Rate:  [77-85] 80  Resp:  [14] 14  BP: (106-128)/(60-78) 106/78  Physical Exam:        Constitutional: Awake, alert, frail    Eyes: PERRLA, sclerae anicteric, no conjunctival injection   HENT: NCAT, mucous membranes moist   Neck: Supple, no thyromegaly, no lymphadenopathy, trachea midline   Respiratory: Clear to auscultation bilaterally, nonlabored respirations    Cardiovascular: RRR, no murmurs, rubs, or gallops, palpable pedal pulses bilaterally   Gastrointestinal: Positive bowel sounds, soft, nontender, nondistended   Musculoskeletal: +Swelling and tenderness to the right distal               thigh    Psychiatric: Appropriate affect, cooperative   Neurologic: Oriented x 1 dementia , strength symmetric in all extremities, Cranial Nerves grossly intact to confrontation, speech clear   Skin: No rashes     Result Review    Result Review:  I have personally reviewed the results from the time of admission and agree with these findings:  [x]  Laboratory  [x]  Radiology  [x]   EKG/Telemetry   [x]  Pathology  [x]  Old records  []  Other:  Most notable findings include:  R femur FX      Assessment/Plan   Assessment / Plan     Brief Patient Summary:  Stefania Kirkpatrick is a 89 y.o. 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  -Pulse ox check  titrate oxygenation as needed    -cxr: negative for pneumonia  -covid: negative  -continue current management  -tele  monitoring continued    -titrate pain control  -supportive managemnt continued   -will continue to follow and monitor closely   -continue checking vitals as per protocol   -continue to monitor electrolyes and renal function daily   - monitor for any fever or chills   - repeat cbc with diff and BMp in AM   - continue DVT and GI px: dvt px as per ortho     Hypertension  -Will Titrate control   -continue amlodipine 5mg po daily   -Monitor BP  -Added Iv hydralazine Prn if needed  -Repeat vitals  -Adjust meds accordingly.  -If BP becomes uncontrolled please call MD  -place on tele if needed    Fall   CT head w/o contrast : negative   Monitor for any focal deficits  Placed on neuro checks  Fall precautions ordered  PT assessment    Risk assessment High due to multiple comorbid conditions      Risk  assessment High due to multiple comorbid conditions    DVT prophylaxis:  SCD /TEDS/ As per ortho   CODE STATUS:    Code Status and Medical Interventions:   Ordered at: 01/04/21 2059     Code Status:    No CPR     Medical Interventions (Level of Support Prior to Arrest):    Full     Comments:    DNR       Admission Status:  I believe this patient meets 1-2  Midnight status.     Electronically signed by Kev Mauricio MD, 01/04/21, 9:00 PM EST.

## 2021-01-05 NOTE — PROGRESS NOTES
Discharge Planning Assessment  [facility]     Patient Name: Stefania Kirkpatrick  MRN: [id number]  Today's Date: 1/5/2021    Admit Date: 1/4/2021    Discharge Needs Assessment     Row Name 01/05/21 1035       Discharge Needs Assessment    Readmission Within the Last 30 Days  no previous admission in last 30 days    Equipment Currently Used at Home  other (see comments) All needs met by LTC    Concerns to be Addressed  discharge planning    Discharge Facility/Level of Care Needs  nursing facility, skilled    Provided Post Acute Provider List?  N/A    Provided Post Acute Provider Quality & Resource List?  N/A        Discharge Plan     Row Name 01/05/21 1045       Plan    Plan  Pt is long term care at [facility] and is LTC she may return there .She is not on oxygen there She is total care .Spoke to pt daughter Alis Kirkpatrick she reports that she wants her mother to return there that she has received excellent care there will need a COVID test no more than 72 hours priori to DC         Continued Care and Services - Admitted Since 1/4/2021    Coordination has not been started for this encounter.         Demographic Summary     Row Name 01/05/21 1012       General Information    Admission Type  inpatient    Referral Source  admission list        Functional Status     Row Name 01/05/21 1034       Functional Status    Usual Activity Tolerance  poor       Functional Status, IADL    Medications  completely dependent    Meal Preparation  completely dependent    Housekeeping  completely dependent    Laundry  completely dependent    Shopping  completely dependent       Mental Status    General Appearance WDL  WDL        Psychosocial    No documentation.       Abuse/Neglect    No documentation.       Legal    No documentation.       Substance Abuse    No documentation.       Patient Forms    No documentation.           Alis Burns RN

## 2021-01-05 NOTE — H&P
[facility]   HISTORY AND PHYSICAL      Name:  Stefania Kirkpatrick   Age:  89 y.o.  Sex:  female  :  1924  MRN:  [id number]   Visit Number:  [id number]  Admission Date:  2021  Date Of Service:  21  Primary Care Physician:  Abilio Garcia MD    Chief Complaint:     Acute right thigh and knee pain after mechanical fall.    History Of Presenting Illness:      89 year old woman with past medical history including CAD, HTN, chronic dementia, generalized osteoarthritis including advanced right hip osteoarthritis, right shoulder contracture, prior right hip replacement for fracture, prior bilateral total knee replacements.  She is known to me as I treated her three years ago when she fell and sustained a left distal femur fracture just above her left total knee replacement, treated with an interlocked intramedullary retrograde distal femur nail construct.  That fracture subsequently healed well.  She now presents via EMS to [facility] ER and admitted to the hospitalist team after a mechanical fall out of her bed.  Exam and imaging reveal an acute comminuted displaced unstable right distal femur fracture.  Orthopaedic Surgery consulted for evaluation and management of the acute right distal femur periprosthetic fracture.  The fracture is in some respects (location, severe comminution, both just above a total knee replacement) is similar to the contralateral left distal femur fracture I treated three years ago.  As for fracture treatment on this new right side, there is the added complexity that the patient has a cemented proximal femur stem on the right that would limit placement of any retrograde nail as was done on the left side.  The patient has baseline limited cognitive capacity, and so the patient's daughter and granddaughter both serve as health proxy and were contacted and we discussed risks, benefits and merits of non-surgical and surgical management for the new acute comminuted right distal  femur fracture.  Informed consent for possible closed reduction and cylinder cast, versus open reduction and internal fixation for the right distal femur fracture was obtained together with the nurse.    Review Of Systems:    General ROS: No fever spike, tmax 99.5 F, no reported loss of consciousness.  Psychological ROS:  Alert, responsive, confused, chronic cognitive decline.  Ophthalmic ROS: No visual disturbance  ENT ROS: No acute sore throat, nasal congestion.   Allergy and Immunology ROS: No rash.  Hematological and Lymphatic ROS: No tendency to bleed.  Endocrine ROS: No recent reported weight gain or loss.  Respiratory ROS: No acute shortness of breath.  Cardiovascular ROS: No reported acute chest pain.  Gastrointestinal ROS: No reported acute abdominal pain..  Genito-Urinary ROS: No reported dysuria.  Musculoskeletal ROS: Chronic back, hip and osteoarthritis pain. No calf pain.  Neurological ROS: No acute focal motor deficit, generalized weakness.  Dermatological ROS: No acute rash.     Past Medical History:    Past Medical History:   Diagnosis Date   • Chronic pain    • Dementia (CMS/HCC)    • Dysuria    • Hematuria    • Hypertension    • Hypertensive heart disease without CHF    • Hypoxemia    • Osteoarthritis    • Shoulder joint contracture, right        Past Surgical history:    Past Surgical History:   Procedure Laterality Date   • FEMUR IM NAILING RETROGRADE Left 3/9/2018    Procedure: FEMUR LEFT DISTAL PERIPROSTHETIC FRACTURE INTRAMEDULLARY NAILING RETROGRADE;  Surgeon: Man Lyons MD;  Location: [facility];  Service: Orthopedics   • HIP BIPOLAR REPLACEMENT Right    • TOTAL KNEE ARTHROPLASTY Bilateral        Social History:    Social History     Socioeconomic History   • Marital status:      Spouse name: Not on file   • Number of children: Not on file   • Years of education: Not on file   • Highest education level: Not on file   Tobacco Use   • Smoking status: Never Smoker   Substance and  Sexual Activity   • Alcohol use: No   • Drug use: No   • Sexual activity: Never       Family History:    History reviewed. No pertinent family history.    Allergies:      Ampicillin and Sulfa antibiotics    Home Medications:    Prior to Admission Medications     Prescriptions Last Dose Informant Patient Reported? Taking?    apixaban (ELIQUIS) 5 MG tablet tablet 1/4/2021  Yes Yes    Take 5 mg by mouth 2 (Two) Times a Day.    HYDROcodone-acetaminophen (NORCO)  MG per tablet 1/4/2021  Yes Yes    Take 1 tablet by mouth Every 6 (Six) Hours As Needed for Moderate Pain .    acetaminophen (TYLENOL) 500 MG tablet Unknown  Yes No    Take 500 mg by mouth Every 6 (Six) Hours As Needed for Mild Pain .    amLODIPine (NORVASC) 5 MG tablet 1/4/2021  Yes No    Take 5 mg by mouth Daily.    bisacodyl (DULCOLAX) 10 MG suppository Not Taking  No No    Insert 1 suppository into the rectum Daily As Needed for Constipation.    Patient not taking:  Reported on 1/5/2021    Cholecalciferol (VITAMIN D) 2000 units tablet 1/4/2021  Yes No    Take 2,000 Units by mouth Daily.    docusate sodium 100 MG capsule 1/4/2021  No No    Take 1 capsule by mouth 2 (Two) Times a Day As Needed for Constipation.    HYDROcodone-acetaminophen (NORCO) 5-325 MG per tablet Not Taking  No No    Take 1 tablet by mouth Every 6 (Six) Hours.    Patient not taking:  Reported on 1/5/2021    Lidocaine 4 % patch Unknown  Yes No    Apply 4 % topically. Apply to left knee daily PRN    magnesium hydroxide (MILK OF MAGNESIA) 2400 MG/10ML suspension suspension Unknown  Yes No    Take 30 mL by mouth Daily As Needed.    melatonin 3 MG tablet 1/3/2021  Yes No    Take 3 mg by mouth Every Night.    sertraline (ZOLOFT) 50 MG tablet 1/4/2021  Yes No    Take 50 mg by mouth Daily.    traMADol (ULTRAM) 50 MG tablet Not Taking  No No    Take 1 tablet by mouth 4 (Four) Times a Day.    Patient not taking:  Reported on 1/5/2021    travoprost, BAK free, (TRAVATAN) 0.004 % solution  ophthalmic solution 1/3/2021  Yes No    1 drop Every Evening. in both eyes at 2200 (per MAR)             ED Medications:    Medications   acetaminophen (TYLENOL) tablet 650 mg (has no administration in time range)   labetalol (NORMODYNE,TRANDATE) injection 10 mg (has no administration in time range)   ondansetron (ZOFRAN) injection 4 mg (has no administration in time range)   traMADol (ULTRAM) tablet 50 mg (has no administration in time range)   sodium chloride 0.9 % flush 10 mL (10 mL Intravenous Given 1/5/21 0842)   sodium chloride 0.9 % flush 10 mL (has no administration in time range)   amLODIPine (NORVASC) tablet 5 mg (5 mg Oral Given 1/5/21 0842)   sertraline (ZOLOFT) tablet 50 mg (50 mg Oral Given 1/5/21 0842)   docusate sodium (COLACE) capsule 100 mg (has no administration in time range)   bisacodyl (DULCOLAX) suppository 10 mg (has no administration in time range)   HYDROcodone-acetaminophen (NORCO) 5-325 MG per tablet 1 tablet (has no administration in time range)   clindamycin (CLEOCIN) 900 mg in sodium chloride 0.9% 50 mL IVPB (premix) (has no administration in time range)   iopamidol (ISOVUE-300) 61 % injection  - ADS Override Pull (has no administration in time range)   Pharmacy to Dose enoxaparin (LOVENOX) (has no administration in time range)   apixaban (ELIQUIS) tablet 5 mg (has no administration in time range)   iopamidol (ISOVUE-300) 61 % injection 100 mL (100 mL Intravenous Given 1/5/21 1215)       Vital Signs:    Temp:  [97.9 °F (36.6 °C)-99.5 °F (37.5 °C)] 98.2 °F (36.8 °C)  Heart Rate:  [60-86] 76  Resp:  [14-20] 17  BP: (106-140)/(59-78) 128/60    Vitals:    01/05/21 0659 01/05/21 0700 01/05/21 0715 01/05/21 1613   BP:   114/62 128/60   BP Location:   Right arm Left arm   Patient Position:   Lying Lying   Pulse: 72 73 64 76   Resp: 16  18 17   Temp:   97.9 °F (36.6 °C) 98.2 °F (36.8 °C)   TempSrc:   Axillary Axillary   SpO2: 93% 93% 93% 95%   Weight:       Height:               01/04/21 1910  01/04/21 2152   Weight: 58.1 kg (128 lb) 56.8 kg (125 lb 3.5 oz)       Body mass index is 28.07 kg/m².      Intake/Output Summary (Last 24 hours) at 1/5/2021 1618  Last data filed at 1/5/2021 0800  Gross per 24 hour   Intake 40 ml   Output --   Net 40 ml       Physical Exam:    General Appearance:    Alert, disoriented, no acute distress.   Head:    Atraumatic and normocephalic, without obvious abnormality.   Eyes:            Extra-ocular movements are within normal limits.   Ears:    Ears appear intact with no abnormalities noted.   Throat:   Oral mucosa moist.   Neck:   Supple, trachea midline.   Back:     Kyphoscoliosis present.   Lungs:     Breath sounds heard bilaterally.  No active wheezing.      Heart:    Regular rate and rhythm.   Abdomen:     Soft, non-distended.   Extremities:   Moves all extremities, right shoulder and arm chronic stiffness, right knee flexed and leg rotated.   Pulses:   Pulses palpable, no cyanosis.   Skin:   No acute rash, no open wound.   Neurologic:   Motor and sensation grossly intact.       Labs:    Lab Results (last 24 hours)     Procedure Component Value Units Date/Time    Basic Metabolic Panel [[id number]]  (Abnormal) Collected: 01/05/21 0617    Specimen: Blood Updated: 01/05/21 0707     Glucose 146 mg/dL      BUN 23 mg/dL      Creatinine 0.63 mg/dL      Sodium 140 mmol/L      Potassium 3.7 mmol/L      Chloride 105 mmol/L      CO2 24.9 mmol/L      Calcium 9.0 mg/dL      eGFR Non African Amer 88 mL/min/1.73      BUN/Creatinine Ratio 36.5     Anion Gap 10.1 mmol/L     Narrative:      GFR Normal >60  Chronic Kidney Disease <60  Kidney Failure <15      CBC & Differential [[id number]]  (Abnormal) Collected: 01/05/21 0616    Specimen: Blood Updated: 01/05/21 0648    Narrative:      The following orders were created for panel order CBC & Differential.  Procedure                               Abnormality         Status                     ---------                                -----------         ------                     CBC Auto Differential[[id number]]        Abnormal            Final result                 Please view results for these tests on the individual orders.    CBC Auto Differential [[id number]]  (Abnormal) Collected: 01/05/21 0616    Specimen: Blood Updated: 01/05/21 0648     WBC 9.87 10*3/mm3      RBC 3.42 10*6/mm3      Hemoglobin 10.2 g/dL      Hematocrit 32.2 %      MCV 94.2 fL      MCH 29.8 pg      MCHC 31.7 g/dL      RDW 14.8 %      RDW-SD 51.3 fl      MPV 10.9 fL      Platelets 209 10*3/mm3      Neutrophil % 85.0 %      Lymphocyte % 6.1 %      Monocyte % 7.7 %      Eosinophil % 0.3 %      Basophil % 0.4 %      Immature Grans % 0.5 %      Neutrophils, Absolute 8.39 10*3/mm3      Lymphocytes, Absolute 0.60 10*3/mm3      Monocytes, Absolute 0.76 10*3/mm3      Eosinophils, Absolute 0.03 10*3/mm3      Basophils, Absolute 0.04 10*3/mm3      Immature Grans, Absolute 0.05 10*3/mm3      nRBC 0.0 /100 WBC     D-dimer, Quantitative [[id number]]  (Abnormal) Collected: 01/04/21 2007    Specimen: Blood Updated: 01/04/21 2315     D-Dimer, Quantitative 3.22 MCGFEU/mL     [place] Draw [[id number]] Collected: 01/04/21 2007    Specimen: Blood Updated: 01/04/21 2116    Narrative:      The following orders were created for panel order [place] Draw.  Procedure                               Abnormality         Status                     ---------                               -----------         ------                     Light Blue Top[[id number]]                                   Final result               Green Top (Gel)[[id number]]                                  Final result               Lavender Top[[id number]]                                     Final result               Gold Top - SST[[id number]]                                   Final result               Green Top (No Gel)[[id number]]                               In process                   Please view results for these tests on the  individual orders.    Light Blue Top [[id number]] Collected: 01/04/21 2007    Specimen: Blood Updated: 01/04/21 2116     Extra Tube hold for add-on     Comment: Auto resulted       Green Top (Gel) [[id number]] Collected: 01/04/21 2007    Specimen: Blood Updated: 01/04/21 2116     Extra Tube Hold for add-ons.     Comment: Auto resulted.       Lavender Top [[id number]] Collected: 01/04/21 2007    Specimen: Blood Updated: 01/04/21 2116     Extra Tube hold for add-on     Comment: Auto resulted       Gold Top - SST [[id number]] Collected: 01/04/21 2007    Specimen: Blood Updated: 01/04/21 2116     Extra Tube Hold for add-ons.     Comment: Auto resulted.       Magnesium [[id number]]  (Normal) Collected: 01/04/21 2007    Specimen: Blood Updated: 01/04/21 2110     Magnesium 2.3 mg/dL     Phosphorus [[id number]]  (Normal) Collected: 01/04/21 2007    Specimen: Blood Updated: 01/04/21 2110     Phosphorus 2.9 mg/dL     COVID-19,[facility] IN-HOUSE,Nasal Swab No Transport Media 3-4 HR TAT - Swab, Nasal Cavity [[id number]]  (Normal) Collected: 01/04/21 2003    Specimen: Swab from Nasal Cavity Updated: 01/04/21 2053     COVID19 Not Detected    Narrative:      Fact sheet for providers: https://www.fda.gov/media/549196/download     Fact sheet for patients: https://www.fda.gov/media/964889/download    Test performed by PCR.    Protime-INR [[id number]]  (Abnormal) Collected: 01/04/21 2007    Specimen: Blood Updated: 01/04/21 2046     Protime 15.6 Seconds      INR 1.19    Narrative:      Suggested INR therapeutic range for stable oral anticoagulant therapy:    Low Intensity therapy:   1.5-2.0  Moderate Intensity therapy:   2.0-3.0  High Intensity therapy:   2.5-4.0    Troponin [[id number]]  (Normal) Collected: 01/04/21 2007    Specimen: Blood Updated: 01/04/21 2036     Troponin T <0.010 ng/mL     Narrative:      Troponin T Reference Range:  <= 0.03 ng/mL-   Negative for AMI  >0.03 ng/mL-     Abnormal for myocardial necrosis.  Clinicians would  have to utilize clinical acumen, EKG, Troponin and serial changes to determine if it is an Acute Myocardial Infarction or myocardial injury due to an underlying chronic condition.       Results may be falsely decreased if patient taking Biotin.      BNP [[id number]]  (Normal) Collected: 01/04/21 2007    Specimen: Blood Updated: 01/04/21 2036     proBNP 579.3 pg/mL     Narrative:      Among patients with dyspnea, NT-proBNP is highly sensitive for the detection of acute congestive heart failure. In addition NT-proBNP of <300 pg/ml effectively rules out acute congestive heart failure with 99% negative predictive value.    Results may be falsely decreased if patient taking Biotin.      Comprehensive Metabolic Panel [[id number]]  (Abnormal) Collected: 01/04/21 2007    Specimen: Blood Updated: 01/04/21 2034     Glucose 210 mg/dL      Comment: Glucose >180, Hemoglobin A1C recommended.        BUN 22 mg/dL      Creatinine 0.66 mg/dL      Sodium 136 mmol/L      Potassium 4.0 mmol/L      Chloride 101 mmol/L      CO2 24.2 mmol/L      Calcium 9.1 mg/dL      Total Protein 6.7 g/dL      Albumin 3.90 g/dL      ALT (SGPT) 11 U/L      AST (SGOT) 18 U/L      Alkaline Phosphatase 57 U/L      Total Bilirubin 1.0 mg/dL      eGFR Non African Amer 83 mL/min/1.73      Globulin 2.8 gm/dL      A/G Ratio 1.4 g/dL      BUN/Creatinine Ratio 33.3     Anion Gap 10.8 mmol/L     Narrative:      GFR Normal >60  Chronic Kidney Disease <60  Kidney Failure <15      CBC & Differential [[id number]]  (Abnormal) Collected: 01/04/21 2007    Specimen: Blood Updated: 01/04/21 2015    Narrative:      The following orders were created for panel order CBC & Differential.  Procedure                               Abnormality         Status                     ---------                               -----------         ------                     CBC Auto Differential[[id number]]        Abnormal            Final result                 Please view results for these tests  on the individual orders.    CBC Auto Differential [[id number]]  (Abnormal) Collected: 01/04/21 2007    Specimen: Blood Updated: 01/04/21 2015     WBC 10.94 10*3/mm3      RBC 3.55 10*6/mm3      Hemoglobin 10.7 g/dL      Hematocrit 33.0 %      MCV 93.0 fL      MCH 30.1 pg      MCHC 32.4 g/dL      RDW 14.8 %      RDW-SD 51.0 fl      MPV 10.2 fL      Platelets 220 10*3/mm3      Neutrophil % 89.7 %      Lymphocyte % 4.1 %      Monocyte % 5.4 %      Eosinophil % 0.0 %      Basophil % 0.3 %      Immature Grans % 0.5 %      Neutrophils, Absolute 9.81 10*3/mm3      Lymphocytes, Absolute 0.45 10*3/mm3      Monocytes, Absolute 0.59 10*3/mm3      Eosinophils, Absolute 0.00 10*3/mm3      Basophils, Absolute 0.03 10*3/mm3      Immature Grans, Absolute 0.06 10*3/mm3      nRBC 0.0 /100 WBC     Green Top (No Gel) [[id number]] Collected: 01/04/21 2007    Specimen: Blood Updated: 01/04/21 2012          Radiology:    Imaging Results (Last 72 Hours)     Procedure Component Value Units Date/Time    XR Chest 1 View [[id number]] Collected: 01/05/21 0958     Updated: 01/05/21 1215    Narrative:      PROCEDURE: XR CHEST 1 VW-        HISTORY: fall, hypoxia; S72.401A-Unspecified fracture of lower end of  right femur, initial encounter for closed fracture; R09.02-Hypoxemia     COMPARISON: January 3, 2021.     FINDINGS: The heart is normal in size. The mediastinum is unremarkable.  There are chronic lung changes. There is no pneumothorax. There are no  acute osseous abnormalities. There are diffuse calcifications.       Impression:      No acute cardiopulmonary process.                       Images were reviewed, interpreted, and dictated by Dr. Ross Bueno M.D.  Transcribed by Iona Gonzalez PA-C.     This report was finalized on 1/5/2021 12:12 PM by Ross Bueno M.D..    XR Femur 2 View Right [[id number]] Collected: 01/05/21 0959     Updated: 01/05/21 1215    Narrative:      PROCEDURE: XR FEMUR 2 VW RIGHT-     HISTORY: reported  distal femur fracture     COMPARISON: None.     FINDINGS:  A 2 view exam demonstrates right hip hemiarthroplasty. There  are postsurgical changes of total right knee arthroplasty. There is a  comminuted fracture of the distal femoral diaphysis with dorsal  angulation.       Impression:      Comminuted fracture of the distal femoral diaphysis with  dorsal angulation.            Images were reviewed, interpreted, and dictated by Dr. Ross Bueno M.D.  Transcribed by Iona Gonzalez PA-C.     This report was finalized on 1/5/2021 12:12 PM by Ross Bueno M.D..    CT Chest Pulmonary Embolism [[id number]] Collected: 01/05/21 1135     Updated: 01/05/21 1139    Narrative:      PROCEDURE: CT CHEST PULMONARY EMBOLISM-     HISTORY: PE suspected, low/intermediate prob, positive D-dimer;  S72.401A-Unspecified fracture of lower end of right femur, initial  encounter for closed fracture; R09.02-Hypoxemia; Z91.81-History of  falling; G30.0-Alzheimer's disease with early onset; F02.80-Dementia in  other diseases classified elsewhere without behavioral disturbance;  S72.491A-Other fracture of lower end of right femur, initial encounter     COMPARISON: None .     TECHNIQUE: Multiple axial CT images were obtained from the thoracic  inlet through the upper abdomen following the administration of Isovue  300 per the CT PE protocol. Coronal and oblique 3D MIP images were  reconstructed from the original axial data set.      FINDINGS: There are no filling defects within the pulmonary arteries to  suggest an embolus. The thoracic aorta is normal in caliber with no  evidence of dissection. The heart is normal in size. There are no  pleural or pericardial effusions. There is no adenopathy. Lung windows  reveal atelectasis in the left greater than right lung bases. Within the  visualized upper abdomen there is the suggestion of gallstones. The  upper abdomen is otherwise unremarkable. Bone windows reveal no acute  osseous  abnormalities.       Impression:      1. No evidence of pulmonary embolus or dissection.  2. Bibasilar atelectasis.  3. Findings suggesting gallstones.           367.74 mGy.cm        This study was performed with techniques to keep radiation doses as low  as reasonably achievable (ALARA). Individualized dose reduction  techniques using automated exposure control or adjustment of mA and/or  kV according to the patient size were employed.      This report was finalized on 1/5/2021 11:37 AM by Ross Bueno M.D..          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    Plan:     Risks, benefits, and alternative treatments discussed with the patient: [x] Yes [] No    Risk benefits and merits of the proposed surgery were discussed and the patient's questions were answered risks discussed including and not limited to:  Anesthesia reactions  Blood loss and possible transfusion  Infection  Deep venous thrombosis and pulmonary embolus  Nerve, vascular or tendon injury  Fracture  Deformity  Stiffness  Weakness  Prosthesis and implant issues such as loosening, material wear or dislocation  Skin necrosis  Revision surgery or further treatment  Recurrence of problem and condition     Informed consent: [] Signed  [x] To be obtained at hospital  [] Both    PLANNED PROCEDURE:  Closed reduction and cylinder cast versus open reduction and internal fixation of right distal femur fracture.    Note:  Should open fracture reduction and stabilization be warranted, the fracture condition is not amenable to retrograde nail fixation due to the ipsilateral right partial hip replacement and presence of a cemented  proximal femur stem.  If closed reduction and cast not sufficient, surgical plan for limited open reduction of fracture and minimally invasive limited contact locking plate and hybrid cortical and locking screws fixation with possible unicortical screws or cable fixation proximally to span the hip replacement stem (Using the Bill NCB periprosthetic femur plate system).    Man Lyons MD  01/05/21  16:18 EST

## 2021-01-05 NOTE — PHARMACY RECOMMENDATION
"Pharmacy Consult for Pharmacokinetics and Stewardship of Anticoagulation Drugs     Stefania Kirkpatrick is a  89 y.o. female.  Height - 142.2 cm (56\")  Weight - 56.8 kg (125 lb 3.5 oz)    BMI (Body Mass Index) = 28.07 kg/m²    Pharmacy consulted for dosing of Enoxaparin.  Indication for use: Post-surgical (hip); VTE prophylaxis.    Labs:  Estimated Creatinine Clearance: 31.9 mL/min (by C-G formula based on SCr of 0.63 mg/dL).  Results from last 7 days   Lab Units 01/05/21  0617 01/05/21  0616 01/04/21 2007   HEMOGLOBIN g/dL  --  10.2* 10.7*   HEMATOCRIT %  --  32.2* 33.0*   PLATELETS 10*3/mm3  --  209 220   CREATININE mg/dL 0.63  --  0.66       Assessment/Plan:  Initiated Enoxaparin 50 mg subq q 24 hrs, starting 1/6 0900 due to s/p surgery.  Discontinued Apixaban as this would be duplicate therapy; newer order for Enoxaparin superseding Apixaban order.    Pharmacy will continue to follow BMP/CMP and CBC results and monitor for signs and symptoms of bleeding or thrombosis.    Thank you for the opportunity to consult on this patient.    Shankar Glynn, Pharm.D.  01/05/21  16:31 EST    "

## 2021-01-06 ENCOUNTER — APPOINTMENT (OUTPATIENT)
Dept: ULTRASOUND IMAGING | Facility: HOSPITAL | Age: 86
End: 2021-01-06

## 2021-01-06 ENCOUNTER — ANESTHESIA EVENT (OUTPATIENT)
Dept: PERIOP | Facility: HOSPITAL | Age: 86
End: 2021-01-06

## 2021-01-06 ENCOUNTER — ANESTHESIA (OUTPATIENT)
Dept: PERIOP | Facility: HOSPITAL | Age: 86
End: 2021-01-06

## 2021-01-06 ENCOUNTER — APPOINTMENT (OUTPATIENT)
Dept: GENERAL RADIOLOGY | Facility: HOSPITAL | Age: 86
End: 2021-01-06

## 2021-01-06 PROBLEM — R06.02 SOB (SHORTNESS OF BREATH): Status: RESOLVED | Noted: 2021-01-04 | Resolved: 2021-01-06
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
## 2021-01-06 LAB
ALBUMIN SERPL-MCNC: 3.6 G/DL (ref 3.5–5.2)
ALBUMIN/GLOB SERPL: 1.3 G/DL
ALP SERPL-CCNC: 53 U/L (ref 39–117)
ALT SERPL W P-5'-P-CCNC: 9 U/L (ref 1–33)
ANION GAP SERPL CALCULATED.3IONS-SCNC: 11.9 MMOL/L (ref 5–15)
AST SERPL-CCNC: 15 U/L (ref 1–32)
BACTERIA UR QL AUTO: ABNORMAL /HPF
BASOPHILS # BLD AUTO: 0.04 10*3/MM3 (ref 0–0.2)
BASOPHILS NFR BLD AUTO: 0.4 % (ref 0–1.5)
BILIRUB SERPL-MCNC: 1 MG/DL (ref 0–1.2)
BILIRUB UR QL STRIP: NEGATIVE
BUN SERPL-MCNC: 19 MG/DL (ref 8–23)
BUN/CREAT SERPL: 39.6 (ref 7–25)
CALCIUM SPEC-SCNC: 8.7 MG/DL (ref 8.2–9.6)
CHLORIDE SERPL-SCNC: 104 MMOL/L (ref 98–107)
CLARITY UR: ABNORMAL
CO2 SERPL-SCNC: 25.1 MMOL/L (ref 22–29)
COLOR UR: ABNORMAL
CREAT SERPL-MCNC: 0.48 MG/DL (ref 0.57–1)
DEPRECATED RDW RBC AUTO: 51.4 FL (ref 37–54)
EOSINOPHIL # BLD AUTO: 0.05 10*3/MM3 (ref 0–0.4)
EOSINOPHIL NFR BLD AUTO: 0.5 % (ref 0.3–6.2)
ERYTHROCYTE [DISTWIDTH] IN BLOOD BY AUTOMATED COUNT: 14.9 % (ref 12.3–15.4)
GFR SERPL CREATININE-BSD FRML MDRD: 120 ML/MIN/1.73
GLOBULIN UR ELPH-MCNC: 2.8 GM/DL
GLUCOSE SERPL-MCNC: 135 MG/DL (ref 65–99)
GLUCOSE UR STRIP-MCNC: NEGATIVE MG/DL
HCT VFR BLD AUTO: 30.4 % (ref 34–46.6)
HGB BLD-MCNC: 9.7 G/DL (ref 12–15.9)
HGB UR QL STRIP.AUTO: ABNORMAL
HYALINE CASTS UR QL AUTO: ABNORMAL /LPF
IMM GRANULOCYTES # BLD AUTO: 0.04 10*3/MM3 (ref 0–0.05)
IMM GRANULOCYTES NFR BLD AUTO: 0.4 % (ref 0–0.5)
KETONES UR QL STRIP: ABNORMAL
LEUKOCYTE ESTERASE UR QL STRIP.AUTO: ABNORMAL
LYMPHOCYTES # BLD AUTO: 0.45 10*3/MM3 (ref 0.7–3.1)
LYMPHOCYTES NFR BLD AUTO: 4.5 % (ref 19.6–45.3)
MCH RBC QN AUTO: 29.8 PG (ref 26.6–33)
MCHC RBC AUTO-ENTMCNC: 31.9 G/DL (ref 31.5–35.7)
MCV RBC AUTO: 93.5 FL (ref 79–97)
MONOCYTES # BLD AUTO: 0.51 10*3/MM3 (ref 0.1–0.9)
MONOCYTES NFR BLD AUTO: 5.1 % (ref 5–12)
NEUTROPHILS NFR BLD AUTO: 8.83 10*3/MM3 (ref 1.7–7)
NEUTROPHILS NFR BLD AUTO: 89.1 % (ref 42.7–76)
NITRITE UR QL STRIP: NEGATIVE
NRBC BLD AUTO-RTO: 0 /100 WBC (ref 0–0.2)
PH UR STRIP.AUTO: <=5 [PH] (ref 5–8)
PLATELET # BLD AUTO: 231 10*3/MM3 (ref 140–450)
PMV BLD AUTO: 10.9 FL (ref 6–12)
POTASSIUM SERPL-SCNC: 3.6 MMOL/L (ref 3.5–5.2)
PROT SERPL-MCNC: 6.4 G/DL (ref 6–8.5)
PROT UR QL STRIP: ABNORMAL
RBC # BLD AUTO: 3.25 10*6/MM3 (ref 3.77–5.28)
RBC # UR: ABNORMAL /HPF
REF LAB TEST METHOD: ABNORMAL
SODIUM SERPL-SCNC: 141 MMOL/L (ref 136–145)
SP GR UR STRIP: >=1.03 (ref 1–1.03)
SQUAMOUS #/AREA URNS HPF: ABNORMAL /HPF
UROBILINOGEN UR QL STRIP: ABNORMAL
WBC # BLD AUTO: 9.92 10*3/MM3 (ref 3.4–10.8)
WBC UR QL AUTO: ABNORMAL /HPF

## 2021-01-06 PROCEDURE — 25010000002 PROPOFOL 200 MG/20ML EMULSION: Performed by: NURSE ANESTHETIST, CERTIFIED REGISTERED

## 2021-01-06 PROCEDURE — 76942 ECHO GUIDE FOR BIOPSY: CPT

## 2021-01-06 PROCEDURE — 0QSBXZZ REPOSITION RIGHT LOWER FEMUR, EXTERNAL APPROACH: ICD-10-PCS | Performed by: ORTHOPAEDIC SURGERY

## 2021-01-06 PROCEDURE — 87086 URINE CULTURE/COLONY COUNT: CPT | Performed by: ORTHOPAEDIC SURGERY

## 2021-01-06 PROCEDURE — 81001 URINALYSIS AUTO W/SCOPE: CPT | Performed by: ORTHOPAEDIC SURGERY

## 2021-01-06 PROCEDURE — 85025 COMPLETE CBC W/AUTO DIFF WBC: CPT | Performed by: FAMILY MEDICINE

## 2021-01-06 PROCEDURE — 87186 SC STD MICRODIL/AGAR DIL: CPT | Performed by: ORTHOPAEDIC SURGERY

## 2021-01-06 PROCEDURE — 27510 TREATMENT OF THIGH FRACTURE: CPT | Performed by: ORTHOPAEDIC SURGERY

## 2021-01-06 PROCEDURE — 80053 COMPREHEN METABOLIC PANEL: CPT | Performed by: FAMILY MEDICINE

## 2021-01-06 PROCEDURE — 99232 SBSQ HOSP IP/OBS MODERATE 35: CPT | Performed by: FAMILY MEDICINE

## 2021-01-06 PROCEDURE — 76000 FLUOROSCOPY <1 HR PHYS/QHP: CPT

## 2021-01-06 PROCEDURE — 87088 URINE BACTERIA CULTURE: CPT | Performed by: ORTHOPAEDIC SURGERY

## 2021-01-06 PROCEDURE — 25010000002 FENTANYL CITRATE (PF) 100 MCG/2ML SOLUTION: Performed by: NURSE ANESTHETIST, CERTIFIED REGISTERED

## 2021-01-06 RX ORDER — ONDANSETRON 4 MG/1
4 TABLET, FILM COATED ORAL EVERY 6 HOURS PRN
Status: DISCONTINUED | OUTPATIENT
Start: 2021-01-06 | End: 2021-01-08

## 2021-01-06 RX ORDER — BUPIVACAINE HYDROCHLORIDE 5 MG/ML
INJECTION, SOLUTION EPIDURAL; INTRACAUDAL
Status: COMPLETED | OUTPATIENT
Start: 2021-01-06 | End: 2021-01-06

## 2021-01-06 RX ORDER — KETAMINE HYDROCHLORIDE 50 MG/ML
INJECTION, SOLUTION, CONCENTRATE INTRAMUSCULAR; INTRAVENOUS AS NEEDED
Status: DISCONTINUED | OUTPATIENT
Start: 2021-01-06 | End: 2021-01-06 | Stop reason: SURG

## 2021-01-06 RX ORDER — SODIUM CHLORIDE, SODIUM LACTATE, POTASSIUM CHLORIDE, CALCIUM CHLORIDE 600; 310; 30; 20 MG/100ML; MG/100ML; MG/100ML; MG/100ML
INJECTION, SOLUTION INTRAVENOUS CONTINUOUS PRN
Status: DISCONTINUED | OUTPATIENT
Start: 2021-01-06 | End: 2021-01-06 | Stop reason: SURG

## 2021-01-06 RX ORDER — NALOXONE HCL 0.4 MG/ML
0.4 VIAL (ML) INJECTION
Status: DISCONTINUED | OUTPATIENT
Start: 2021-01-06 | End: 2021-01-08

## 2021-01-06 RX ORDER — SODIUM CHLORIDE, SODIUM LACTATE, POTASSIUM CHLORIDE, CALCIUM CHLORIDE 600; 310; 30; 20 MG/100ML; MG/100ML; MG/100ML; MG/100ML
30 INJECTION, SOLUTION INTRAVENOUS CONTINUOUS
Status: DISCONTINUED | OUTPATIENT
Start: 2021-01-06 | End: 2021-01-14 | Stop reason: HOSPADM

## 2021-01-06 RX ORDER — SODIUM CHLORIDE 0.9 % (FLUSH) 0.9 %
1-10 SYRINGE (ML) INJECTION AS NEEDED
Status: DISCONTINUED | OUTPATIENT
Start: 2021-01-06 | End: 2021-01-14 | Stop reason: HOSPADM

## 2021-01-06 RX ORDER — CLINDAMYCIN PHOSPHATE 900 MG/50ML
900 INJECTION, SOLUTION INTRAVENOUS ONCE
Status: COMPLETED | OUTPATIENT
Start: 2021-01-06 | End: 2021-01-06

## 2021-01-06 RX ORDER — CLINDAMYCIN PHOSPHATE 600 MG/50ML
600 INJECTION, SOLUTION INTRAVENOUS EVERY 8 HOURS
Status: COMPLETED | OUTPATIENT
Start: 2021-01-06 | End: 2021-01-07

## 2021-01-06 RX ORDER — FENTANYL CITRATE 50 UG/ML
INJECTION, SOLUTION INTRAMUSCULAR; INTRAVENOUS AS NEEDED
Status: DISCONTINUED | OUTPATIENT
Start: 2021-01-06 | End: 2021-01-06 | Stop reason: SURG

## 2021-01-06 RX ORDER — ONDANSETRON 2 MG/ML
4 INJECTION INTRAMUSCULAR; INTRAVENOUS EVERY 6 HOURS PRN
Status: DISCONTINUED | OUTPATIENT
Start: 2021-01-06 | End: 2021-01-08

## 2021-01-06 RX ORDER — PROPOFOL 10 MG/ML
INJECTION, EMULSION INTRAVENOUS AS NEEDED
Status: DISCONTINUED | OUTPATIENT
Start: 2021-01-06 | End: 2021-01-06 | Stop reason: SURG

## 2021-01-06 RX ORDER — SODIUM CHLORIDE 0.9 % (FLUSH) 0.9 %
3 SYRINGE (ML) INJECTION EVERY 12 HOURS SCHEDULED
Status: DISCONTINUED | OUTPATIENT
Start: 2021-01-06 | End: 2021-01-14 | Stop reason: HOSPADM

## 2021-01-06 RX ORDER — MORPHINE SULFATE 2 MG/ML
2 INJECTION, SOLUTION INTRAMUSCULAR; INTRAVENOUS
Status: DISCONTINUED | OUTPATIENT
Start: 2021-01-06 | End: 2021-01-08

## 2021-01-06 RX ORDER — VASOPRESSIN 20 U/ML
INJECTION PARENTERAL AS NEEDED
Status: DISCONTINUED | OUTPATIENT
Start: 2021-01-06 | End: 2021-01-06 | Stop reason: SURG

## 2021-01-06 RX ORDER — LORAZEPAM 2 MG/ML
1 INJECTION INTRAMUSCULAR
Status: DISCONTINUED | OUTPATIENT
Start: 2021-01-06 | End: 2021-01-06 | Stop reason: HOSPADM

## 2021-01-06 RX ADMIN — FENTANYL CITRATE 50 MCG: 50 INJECTION INTRAMUSCULAR; INTRAVENOUS at 12:09

## 2021-01-06 RX ADMIN — FENTANYL CITRATE 50 MCG: 50 INJECTION INTRAMUSCULAR; INTRAVENOUS at 12:03

## 2021-01-06 RX ADMIN — PROPOFOL 50 MG: 10 INJECTION, EMULSION INTRAVENOUS at 11:48

## 2021-01-06 RX ADMIN — KETAMINE HYDROCHLORIDE 25 MG: 50 INJECTION, SOLUTION INTRAMUSCULAR; INTRAVENOUS at 11:46

## 2021-01-06 RX ADMIN — APIXABAN 5 MG: 5 TABLET, FILM COATED ORAL at 20:39

## 2021-01-06 RX ADMIN — BUPIVACAINE HYDROCHLORIDE 7 ML: 5 INJECTION, SOLUTION EPIDURAL; INTRACAUDAL; PERINEURAL at 12:42

## 2021-01-06 RX ADMIN — HYDROCODONE BITARTRATE AND ACETAMINOPHEN 1 TABLET: 5; 325 TABLET ORAL at 20:39

## 2021-01-06 RX ADMIN — SODIUM CHLORIDE, POTASSIUM CHLORIDE, SODIUM LACTATE AND CALCIUM CHLORIDE 100 ML/HR: 600; 310; 30; 20 INJECTION, SOLUTION INTRAVENOUS at 14:30

## 2021-01-06 RX ADMIN — SODIUM CHLORIDE, POTASSIUM CHLORIDE, SODIUM LACTATE AND CALCIUM CHLORIDE: 600; 310; 30; 20 INJECTION, SOLUTION INTRAVENOUS at 11:46

## 2021-01-06 RX ADMIN — SODIUM CHLORIDE, PRESERVATIVE FREE 10 ML: 5 INJECTION INTRAVENOUS at 14:30

## 2021-01-06 RX ADMIN — CLINDAMYCIN PHOSPHATE 600 MG: 600 INJECTION, SOLUTION INTRAVENOUS at 19:00

## 2021-01-06 RX ADMIN — EPHEDRINE SULFATE 10 MG: 50 INJECTION INTRAMUSCULAR; INTRAVENOUS; SUBCUTANEOUS at 11:52

## 2021-01-06 RX ADMIN — SODIUM CHLORIDE, PRESERVATIVE FREE 3 ML: 5 INJECTION INTRAVENOUS at 20:40

## 2021-01-06 RX ADMIN — CLINDAMYCIN PHOSPHATE 900 MG: 900 INJECTION, SOLUTION INTRAVENOUS at 11:51

## 2021-01-06 RX ADMIN — VASOPRESSIN 4 UNITS: 20 INJECTION INTRAVENOUS at 11:53

## 2021-01-06 NOTE — ANESTHESIA PROCEDURE NOTES
Peripheral Block      Patient reassessed immediately prior to procedure    Patient location during procedure: OR  Start time: 1/6/2021 12:41 PM  Stop time: 1/6/2021 12:42 PM  Performed by  CRNA: Mingo Martinez CRNA  Preanesthetic Checklist  Completed: patient identified, site marked, surgical consent, pre-op evaluation, timeout performed, IV checked, risks and benefits discussed and monitors and equipment checked  Prep:  Pt Position: supine  Sterile barriers:gloves, sterile barriers and cap  Prep: ChloraPrep  Patient monitoring: continuous pulse oximetry, EKG and blood pressure monitoring  Procedure  Sedation:yes    Guidance:ultrasound guided  ULTRASOUND INTERPRETATION.  Using ultrasound guidance a 20 G gauge needle was placed in close proximity to the femoral nerve, at which point, under ultrasound guidance anesthetic was injected in the area of the nerve and spread of the anesthesia was seen on ultrasound in close proximity thereto.  There were no abnormalities seen on ultrasound; a digital image was taken; and the patient tolerated the procedure with no complications. Images:still images obtained    Laterality:right  Block Type:femoral  Injection Technique:single-shot  Needle Type:echogenic  Needle Gauge:20 G  Resistance on Injection: none    Medications Used: bupivacaine PF (MARCAINE) injection 0.5%, 7 mL  Med admintered at 1/6/2021 12:42 PM      Medications  Preservative Free Saline:7ml  Comment:Adjuncts per total volume of LA:    Decadron 4 mg PSF    If required, intravenous sedation was given -- see meds on anesthesia record.    Post Assessment  Injection Assessment: negative aspiration for heme, no paresthesia on injection and incremental injection  Patient Tolerance:comfortable throughout block  Complications:no  Additional Notes  Procedure:                   FEMORAL NERVE BLOCK                                        Patient analgesia was achieved with General Anesthesia       The pt was placed in a supine  position.  The Insertion site was  prepped and draped in sterile fashion.  The femoral artery was identified with the ultrasound transducer placed along the inguinal crease. A pajunk echogenic needle was then  inserted approximately 1-2 cm lateral to the ultasound transducer and advanced in-plane using a lateral to medial direction until the needle tip passes through the fascia iliaca and is adjacent to the nerve. LA injection spread was visualized; injection was incremental at 1-5 ml; injection pressure was normal or little and no intraneural injection, no vascular injection.

## 2021-01-06 NOTE — ANESTHESIA PROCEDURE NOTES
Airway  Urgency: elective    Date/Time: 1/6/2021 11:49 AM  Airway not difficult    General Information and Staff    Patient location during procedure: OR  CRNA: Mingo Martinez CRNA    Indications and Patient Condition  Indications for airway management: airway protection    Preoxygenated: yes  Mask difficulty assessment: 0 - not attempted    Final Airway Details  Final airway type: supraglottic airway      Successful airway: classic  Size 4    Number of attempts at approach: 1

## 2021-01-06 NOTE — INTERVAL H&P NOTE
H&P reviewed. The patient was examined and there are no changes to the H&P.      Vitals:    01/06/21 0821   BP: 124/52   Pulse: 86   Resp: 19   Temp: 98.6 °F (37 °C)   SpO2: 100%     Man Lyons MD  1/6/2021  10:53 EST

## 2021-01-06 NOTE — OP NOTE
[address],  [address]  [place] [address]  [phone]        OPERATIVE REPORT         PATIENT NAME:   Stefania Kirkpatrick                            YOB: 1924        PREOP DIAGNOSIS:     Right closed comminuted displaced and angulated periprosthetic distal femur fracture.     POSTOP DIAGNOSIS:     Same.     PROCEDURE:       Right femur closed reduction, long leg cylinder cast for fracture.     SURGEON:         Rashel Lyons MD    OPERATIVE TEAM:   Circulator: Mable Baker RN  Radiology Technologist: Luba Gomez  Scrub Person: John Carr CSA; Marlene Thompson    ANESTHETIST:   CRNA: Mingo Martinez CRNA    ANESTHESIA:   General plus right fascia-iliacus block.    ESTIM BLOOD LOSS:  None.     COMPLICATIONS:      None.     DISPOSITION:       Stable to recovery.     INDICATIONS:       Displaced comminuted angulated right distal femur fracture.     NARRATIVE:       The patient presents for planned fracture treatment. Risks, benefits and alternatives were discussed and informed consent for surgical procedure obtained with health surrogate. Risks were discussed including but not limited to anesthesia, infection, fracture displacement, malunion, nonunion and further treatment. Goals include restoration of position of the bone and immobilization to improve fracture healing potential and functional outcome.     Antibiotic prophylaxis was given. Surgeon site marking and a time out were performed prior to the procedure.  After effective anesthesia, the right leg was gently manipulated and the fracture reasonably reduced and well apposed and aligned on the AP view and well apposed with mild residual apex posterior angulation on the lateral view, and as seen with the C-arm. A well padded and molded fiberglass long leg cylinder cast was applied.  There was an overall good reduction, alignment and clinical position considering the severity of the fracture.  Consideration  was given to open reduction and internal fixation, as detailed in the history and physical report.  However, given the very limited physical functional level of the patient (max assist transfers) and right shoulder contracture and limitations of left hip end-stage osteoarthritis, and her chronic medical and cardiac comorbidity conditions, non-surgical management of the acute right distal femur fracture as initial management was preferred.  The closed reduction and fracture position achieved in the cylinder cast also favored initial non-surgical management.  C-arm images were saved, including several images of the left hip and lower extremity that showed no acute fracture.  Anesthesia was effective and well tolerated. There were no complications of the procedure. The patient was transferred stable to recovery.

## 2021-01-06 NOTE — PROGRESS NOTES
[facility]    Name:  Stefania Kirkpatrick   Age:  89 y.o.  Sex:  female  :  1924  MRN:  [id number]   Visit Number:  [id number]  Admission Date:  2021  Date Of Service:  21  Primary Care Physician:  Abilio Garcia MD     LOS: 2 days :  Patient Care Team:  Abilio Garcia MD as PCP - General:    Chief Complaint:      Follow-up on hip fracture    Subjective / Interval History:     Patient seen and examined this morning.  No acute changes noted overnight per nursing staff.  Patient is unable to follow my commands.  She can answer some questions, however she is significantly disoriented.  She does note pain of her leg.  Orthopedics has been consulted and discussed plan of care with family.  We will proceed with operation today.    Review of Systems:     Complete review of systems unobtainable secondary to current condition    Vital Signs:    Temp:  [97.9 °F (36.6 °C)-99.2 °F (37.3 °C)] 98.6 °F (37 °C)  Heart Rate:  [75-86] 86  Resp:  [17-20] 19  BP: (115-128)/(52-95) 124/52    Intake and output:    I/O last 3 completed shifts:  In: 240 [P.O.:190; IV Piggyback:50]  Out: -   I/O this shift:  In: 750 [I.V.:750]  Out: 0     Physical Examination:    General Appearance:   Frail appearing elderly female.  Confused.   Head:  Atraumatic and normocephalic, without obvious abnormality.   Eyes:          PERRLA, conjunctivae and sclerae normal, no Icterus. No pallor.    Neck: Supple, trachea midline, no thyromegaly.   Lungs:   Breath sounds heard bilaterally equally.  No crackles or wheezing. No Pleural rub or bronchial breathing.   Heart:  Normal S1 and S2, no murmur, no gallop, no rub. No JVD   Abdomen:   Normal bowel sounds, no masses, no organomegaly. Soft, non-tender, non-distended, no guarding, no rebound tenderness.   Extremities:  Tenderness to palpation anterior laterally of the right thigh/hip region.  No bruising.  No edema, no cyanosis, no clubbing.    Skin: No bleeding, bruising or rash.   Neurologic:  Is arousable.  Disoriented.     Laboratory results:    Results from last 7 days   Lab Units 01/06/21  0547 01/05/21  0617 01/04/21 2007   SODIUM mmol/L 141 140 136   POTASSIUM mmol/L 3.6 3.7 4.0   CHLORIDE mmol/L 104 105 101   CO2 mmol/L 25.1 24.9 24.2   BUN mg/dL 19 23 22   CREATININE mg/dL 0.48* 0.63 0.66   CALCIUM mg/dL 8.7 9.0 9.1   BILIRUBIN mg/dL 1.0  --  1.0   ALK PHOS U/L 53  --  57   ALT (SGPT) U/L 9  --  11   AST (SGOT) U/L 15  --  18   GLUCOSE mg/dL 135* 146* 210*     Results from last 7 days   Lab Units 01/06/21  0547 01/05/21  0616 01/04/21 2007   WBC 10*3/mm3 9.92 9.87 10.94*   HEMOGLOBIN g/dL 9.7* 10.2* 10.7*   HEMATOCRIT % 30.4* 32.2* 33.0*   PLATELETS 10*3/mm3 231 209 220     Results from last 7 days   Lab Units 01/04/21 2007   INR  1.19*     Results from last 7 days   Lab Units 01/04/21 2007   TROPONIN T ng/mL <0.010               I have reviewed the patient's laboratory results.    Radiology results:    Imaging Results (Last 24 Hours)     Procedure Component Value Units Date/Time    FL C Arm During Surgery [[id number]] Resulted: 01/06/21 1256     Updated: 01/06/21 1256    Narrative:      This procedure was auto-finalized with no dictation required.    US Perez OR Procedure [[id number]] Resulted: 01/06/21 1216     Updated: 01/06/21 1216          I have reviewed the patient's radiology reports.    Medication Review:     I have reviewed the patient's active and prn medications.       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  Closed fracture of distal end of right femur  2.  Status post fall  3.  Essential hypertension  4  Alzheimer's dementia  5.  Atelectasis    Plan:    Patient's vital signs reviewed and overall stable.  Continue with as needed pain control and  antiemetics.  Continue with home anticoagulation after surgery.  Follow-up on patient after surgery.    Patient will need a Covid test prior to discharge back to [place].  Anticipate she can go back to nursing facility in the next 24 to 48 hours depending upon how she does with surgery.  Patient's PCP is Dr. Garcia.    Stefani Astudillo,   01/06/21  13:23 EST    Dictated utilizing Dragon dictation.

## 2021-01-06 NOTE — ANESTHESIA POSTPROCEDURE EVALUATION
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        Post Anesthesia Care and Evaluation    Patient location during evaluation: PACU  Patient participation: complete - patient participated  Level of consciousness: awake and alert  Pain management: satisfactory to patient  Airway patency: patent  Anesthetic complications: No anesthetic complications    Cardiovascular status: acceptable and hemodynamically stable  Respiratory status: acceptable  Hydration status: acceptable

## 2021-01-06 NOTE — ANESTHESIA PREPROCEDURE EVALUATION
Anesthesia Evaluation     Patient summary reviewed and Nursing notes reviewed   NPO Solid Status: > 8 hours  NPO Liquid Status: > 8 hours           Airway   Dental      Pulmonary    (+) shortness of breath,   Cardiovascular     (+) hypertension,       Neuro/Psych  (+) psychiatric history, dementia,     GI/Hepatic/Renal/Endo - negative ROS     Musculoskeletal     Abdominal    Substance History - negative use     OB/GYN negative ob/gyn ROS         Other   arthritis,      ROS/Med Hx Other: CT-  IMPRESSION:  1. No evidence of pulmonary embolus or dissection.  2. Bibasilar atelectasis.  3. Findings suggesting gallstones.                     Anesthesia Plan    ASA 3     general with block     intravenous induction     Anesthetic plan, all risks, benefits, and alternatives have been provided, discussed and informed consent has been obtained with: patient.    Plan discussed with attending.

## 2021-01-06 NOTE — THERAPY EVALUATION
Hold PT eval this date d/t patient having surgery to repair hip fx. PT will follow up with patient tomorrow and proceed with eval as tolerated and appropriate.

## 2021-01-07 ENCOUNTER — APPOINTMENT (OUTPATIENT)
Dept: GENERAL RADIOLOGY | Facility: HOSPITAL | Age: 86
End: 2021-01-07

## 2021-01-07 LAB
ANION GAP SERPL CALCULATED.3IONS-SCNC: 10.9 MMOL/L (ref 5–15)
BASOPHILS # BLD AUTO: 0.01 10*3/MM3 (ref 0–0.2)
BASOPHILS NFR BLD AUTO: 0.1 % (ref 0–1.5)
BUN SERPL-MCNC: 18 MG/DL (ref 8–23)
BUN/CREAT SERPL: 39.1 (ref 7–25)
CALCIUM SPEC-SCNC: 8.6 MG/DL (ref 8.2–9.6)
CHLORIDE SERPL-SCNC: 109 MMOL/L (ref 98–107)
CO2 SERPL-SCNC: 24.1 MMOL/L (ref 22–29)
CREAT SERPL-MCNC: 0.46 MG/DL (ref 0.57–1)
DEPRECATED RDW RBC AUTO: 50.2 FL (ref 37–54)
EOSINOPHIL # BLD AUTO: 0 10*3/MM3 (ref 0–0.4)
EOSINOPHIL NFR BLD AUTO: 0 % (ref 0.3–6.2)
ERYTHROCYTE [DISTWIDTH] IN BLOOD BY AUTOMATED COUNT: 14.8 % (ref 12.3–15.4)
GFR SERPL CREATININE-BSD FRML MDRD: 126 ML/MIN/1.73
GLUCOSE SERPL-MCNC: 127 MG/DL (ref 65–99)
HCT VFR BLD AUTO: 26.4 % (ref 34–46.6)
HGB BLD-MCNC: 8.6 G/DL (ref 12–15.9)
IMM GRANULOCYTES # BLD AUTO: 0.07 10*3/MM3 (ref 0–0.05)
IMM GRANULOCYTES NFR BLD AUTO: 0.9 % (ref 0–0.5)
LYMPHOCYTES # BLD AUTO: 0.55 10*3/MM3 (ref 0.7–3.1)
LYMPHOCYTES NFR BLD AUTO: 7.3 % (ref 19.6–45.3)
MCH RBC QN AUTO: 30.2 PG (ref 26.6–33)
MCHC RBC AUTO-ENTMCNC: 32.6 G/DL (ref 31.5–35.7)
MCV RBC AUTO: 92.6 FL (ref 79–97)
MONOCYTES # BLD AUTO: 0.47 10*3/MM3 (ref 0.1–0.9)
MONOCYTES NFR BLD AUTO: 6.2 % (ref 5–12)
NEUTROPHILS NFR BLD AUTO: 6.46 10*3/MM3 (ref 1.7–7)
NEUTROPHILS NFR BLD AUTO: 85.5 % (ref 42.7–76)
NRBC BLD AUTO-RTO: 0 /100 WBC (ref 0–0.2)
PLATELET # BLD AUTO: 240 10*3/MM3 (ref 140–450)
PMV BLD AUTO: 10.8 FL (ref 6–12)
POTASSIUM SERPL-SCNC: 4 MMOL/L (ref 3.5–5.2)
RBC # BLD AUTO: 2.85 10*6/MM3 (ref 3.77–5.28)
SODIUM SERPL-SCNC: 144 MMOL/L (ref 136–145)
WBC # BLD AUTO: 7.56 10*3/MM3 (ref 3.4–10.8)

## 2021-01-07 PROCEDURE — 73552 X-RAY EXAM OF FEMUR 2/>: CPT

## 2021-01-07 PROCEDURE — 80048 BASIC METABOLIC PNL TOTAL CA: CPT | Performed by: ORTHOPAEDIC SURGERY

## 2021-01-07 PROCEDURE — 85025 COMPLETE CBC W/AUTO DIFF WBC: CPT | Performed by: ORTHOPAEDIC SURGERY

## 2021-01-07 PROCEDURE — 99024 POSTOP FOLLOW-UP VISIT: CPT | Performed by: PHYSICIAN ASSISTANT

## 2021-01-07 PROCEDURE — 99232 SBSQ HOSP IP/OBS MODERATE 35: CPT | Performed by: INTERNAL MEDICINE

## 2021-01-07 PROCEDURE — 25010000002 MORPHINE SULFATE (PF) 2 MG/ML SOLUTION: Performed by: ORTHOPAEDIC SURGERY

## 2021-01-07 RX ORDER — CEFUROXIME AXETIL 250 MG/1
250 TABLET ORAL EVERY 12 HOURS SCHEDULED
Status: COMPLETED | OUTPATIENT
Start: 2021-01-07 | End: 2021-01-10

## 2021-01-07 RX ADMIN — APIXABAN 5 MG: 5 TABLET, FILM COATED ORAL at 20:00

## 2021-01-07 RX ADMIN — SERTRALINE HYDROCHLORIDE 50 MG: 50 TABLET ORAL at 09:25

## 2021-01-07 RX ADMIN — APIXABAN 5 MG: 5 TABLET, FILM COATED ORAL at 09:25

## 2021-01-07 RX ADMIN — SODIUM CHLORIDE, PRESERVATIVE FREE 3 ML: 5 INJECTION INTRAVENOUS at 13:20

## 2021-01-07 RX ADMIN — HYDROCODONE BITARTRATE AND ACETAMINOPHEN 1 TABLET: 5; 325 TABLET ORAL at 21:05

## 2021-01-07 RX ADMIN — SODIUM CHLORIDE, POTASSIUM CHLORIDE, SODIUM LACTATE AND CALCIUM CHLORIDE 100 ML/HR: 600; 310; 30; 20 INJECTION, SOLUTION INTRAVENOUS at 13:40

## 2021-01-07 RX ADMIN — SODIUM CHLORIDE, PRESERVATIVE FREE 3 ML: 5 INJECTION INTRAVENOUS at 20:01

## 2021-01-07 RX ADMIN — SODIUM CHLORIDE, PRESERVATIVE FREE 10 ML: 5 INJECTION INTRAVENOUS at 20:01

## 2021-01-07 RX ADMIN — HYDROCODONE BITARTRATE AND ACETAMINOPHEN 1 TABLET: 5; 325 TABLET ORAL at 09:25

## 2021-01-07 RX ADMIN — CEFUROXIME AXETIL 250 MG: 250 TABLET ORAL at 20:01

## 2021-01-07 RX ADMIN — MORPHINE SULFATE 2 MG: 2 INJECTION, SOLUTION INTRAMUSCULAR; INTRAVENOUS at 13:20

## 2021-01-07 RX ADMIN — AMLODIPINE BESYLATE 5 MG: 5 TABLET ORAL at 09:25

## 2021-01-07 RX ADMIN — HYDROCODONE BITARTRATE AND ACETAMINOPHEN 1 TABLET: 5; 325 TABLET ORAL at 15:05

## 2021-01-07 RX ADMIN — HYDROCODONE BITARTRATE AND ACETAMINOPHEN 1 TABLET: 5; 325 TABLET ORAL at 06:19

## 2021-01-07 RX ADMIN — MORPHINE SULFATE 2 MG: 2 INJECTION, SOLUTION INTRAMUSCULAR; INTRAVENOUS at 22:13

## 2021-01-07 RX ADMIN — CLINDAMYCIN PHOSPHATE 600 MG: 600 INJECTION, SOLUTION INTRAVENOUS at 04:12

## 2021-01-07 RX ADMIN — MORPHINE SULFATE 2 MG: 2 INJECTION, SOLUTION INTRAMUSCULAR; INTRAVENOUS at 19:59

## 2021-01-07 NOTE — THERAPY EVALUATION
Hold PT eval d/t patient receiving xray to r/o L femur fx d/t consistent reports of pain. PT will follow up with patient tomorrow and proceed with eval as tolerated and appropriate.

## 2021-01-07 NOTE — PROGRESS NOTES
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  Currently she is lying in bed nonverbal does not appear to be particularly comfortable but does not appear to be in respiratory distress.      Vital Signs  Temp:  [97 °F (36.1 °C)-98 °F (36.7 °C)] 97.7 °F (36.5 °C)  Heart Rate:  [65-83] 65  Resp:  [18] 18  BP: (114-144)/(59-74) 144/67    Physical Exam:   General Appearance:       Head:    Normocephalic, without obvious abnormality, atraumatic       Throat:      Neck:     Back:        Lungs:    She has kind of a stuttering respiratory pattern but lungs sound clear without rales rhonchi or wheeze    Heart:   There is a regular rhythm without murmur or S3   Chest Wall:       Abdomen:    Abdomen soft with no tenderness or mass appreciated bowel sounds are present   Extremities:  Cylindrical cast noted there is no lower extremity edema on the right   Pulses:      Skin:      Lymph nodes:      Neurologic:            Results Review:     I reviewed the patient's new clinical results.    Lab Results (last 24 hours)     Procedure Component Value Units Date/Time    Urine Culture - Urine, Urine, Catheter In/Out [[id number]]  (Abnormal) Collected: 01/06/21 1117    Specimen: Urine, Catheter In/Out Updated: 01/07/21 0912     Urine Culture >100,000 CFU/mL Escherichia coli      >100,000 CFU/mL Gram Positive Cocci    Basic Metabolic Panel [[id number]]  (Abnormal) Collected: 01/07/21 0624    Specimen: Blood Updated: 01/07/21 0650     Glucose 127 mg/dL      BUN 18 mg/dL      Creatinine 0.46 mg/dL      Sodium 144 mmol/L      Potassium 4.0 mmol/L      Chloride 109 mmol/L      CO2  24.1 mmol/L      Calcium 8.6 mg/dL      eGFR Non African Amer 126 mL/min/1.73      BUN/Creatinine Ratio 39.1     Anion Gap 10.9 mmol/L     Narrative:      GFR Normal >60  Chronic Kidney Disease <60  Kidney Failure <15      CBC & Differential [[id number]]  (Abnormal) Collected: 01/07/21 0624    Specimen: Blood Updated: 01/07/21 0632    Narrative:      The following orders were created for panel order CBC & Differential.  Procedure                               Abnormality         Status                     ---------                               -----------         ------                     CBC Auto Differential[[id number]]        Abnormal            Final result                 Please view results for these tests on the individual orders.    CBC Auto Differential [[id number]]  (Abnormal) Collected: 01/07/21 0624    Specimen: Blood Updated: 01/07/21 0632     WBC 7.56 10*3/mm3      RBC 2.85 10*6/mm3      Hemoglobin 8.6 g/dL      Hematocrit 26.4 %      MCV 92.6 fL      MCH 30.2 pg      MCHC 32.6 g/dL      RDW 14.8 %      RDW-SD 50.2 fl      MPV 10.8 fL      Platelets 240 10*3/mm3      Neutrophil % 85.5 %      Lymphocyte % 7.3 %      Monocyte % 6.2 %      Eosinophil % 0.0 %      Basophil % 0.1 %      Immature Grans % 0.9 %      Neutrophils, Absolute 6.46 10*3/mm3      Lymphocytes, Absolute 0.55 10*3/mm3      Monocytes, Absolute 0.47 10*3/mm3      Eosinophils, Absolute 0.00 10*3/mm3      Basophils, Absolute 0.01 10*3/mm3      Immature Grans, Absolute 0.07 10*3/mm3      nRBC 0.0 /100 WBC         Imaging Results (Last 24 Hours)     Procedure Component Value Units Date/Time    XR Femur 2 View Left [[id number]] Collected: 01/07/21 1308     Updated: 01/07/21 1310    Narrative:      PROCEDURE: XR FEMUR 2 VW LEFT-     HISTORY: previous fall with left thigh pain; S72.401A-Unspecified  fracture of lower end of right femur, initial encounter for closed  fracture; R09.02-Hypoxemia; Z91.81-History of falling; G30.0-Alzheimer's  disease  with early onset; F02.80-Dementia in other diseases classified  elsewhere without behavioral disturbance; S72.491A-Other fracture of  lower end of right femur, initial encounter for closed fractur     COMPARISON: January 3, 2021.     FINDINGS:  A 2 view exam demonstrates no acute fracture or dislocation.   The patient is status post right hip hemiarthroplasty. An intramedullary  debbi is seen in the left femur. The patient is also status post total  left knee arthroplasty.  No soft tissue abnormality is seen.       Impression:      No acute fracture. If clinical symptoms persist consider a  CT of the left hip.              This report was finalized on 1/7/2021 1:08 PM by Ross Bueno M.D..            Abilio Garcia MD  01/07/21  16:35 EST

## 2021-01-07 NOTE — PROGRESS NOTES
"      [facility]  PROGRESS NOTE    Name:  Stefania Kirkpatrick   Age:  89 y.o.  Sex:  female  :  1924  MRN:  [id number]   Visit Number:  [id number]  Admission Date:  2021  Date Of Service:  21  Primary Care Physician:  Abilio Garcia MD     LOS: 3 days :  Patient Care Team:  Abilio Garcia MD as PCP - General:    Chief Complaint:    Patient is being reevaluated status post cast placement for right femur fracture    Subjective / Interval History:     Patient is lying in bed in no apparent distress.  She does state \"both of her legs hurt\"  The right leg cylinder cast is in good position.     Review of Systems:     General ROS: No fever spike, no acute cognitive change.  Ophthalmic ROS: no acute visual disturbance.  ENT ROS: No acute sinus congestion.   Respiratory ROS: No new shortness of breath.   Cardiovascular ROS: No new chest pain.  Gastrointestinal ROS: No acute abdominal change. Non-distended.  Genito-Urinary ROS: No reported dysuria.  Musculoskeletal ROS: No deep calf pain. No acute focal motor deficit  Neurological ROS: No new numbness or tingling.  Dermatological ROS: No erythema.  No cyanosis.  No rash or pruritis.    Vital Signs:    Temp:  [97 °F (36.1 °C)-98.9 °F (37.2 °C)] 98 °F (36.7 °C)  Heart Rate:  [72-83] 78  Resp:  [17-24] 18  BP: ()/(49-93) 137/74    Intake and output:    I/O last 3 completed shifts:  In: 800 [I.V.:750; IV Piggyback:50]  Out: 0   I/O this shift:  In: 240 [P.O.:240]  Out: -     Physical Examination:    General Appearance:    Alert, cooperative, and no acute distress.   Head:    Atraumatic and normocephalic, without obvious abnormality.   Eyes:    Extraocular movements are within normal limits.   ENT:   No acute change.   Neck:   Supple, trachea midline.   Lungs:     Normal respirations, unlabored.    Heart:    Regular rate.   Abdomen:     Soft, nondistended.   Extremities:   + ttp and the patient moans with palpation to the left mid femur. " No ttp to the left knee, lower leg or ankle.No new motor deficit,  Left leg Homans sign neg.   Skin:     No rash.  No cyanosis.  No erythema. .      The right leg cylinder cast is in good position, patient is NVI.   Neurologic:   Sensation intact, pulses intact.     Laboratory results:    Lab Results (last 24 hours)     Procedure Component Value Units Date/Time    Urine Culture - Urine, Urine, Catheter In/Out [[id number]]  (Abnormal) Collected: 01/06/21 1117    Specimen: Urine, Catheter In/Out Updated: 01/07/21 0912     Urine Culture >100,000 CFU/mL Escherichia coli      >100,000 CFU/mL Gram Positive Cocci    Basic Metabolic Panel [[id number]]  (Abnormal) Collected: 01/07/21 0624    Specimen: Blood Updated: 01/07/21 0650     Glucose 127 mg/dL      BUN 18 mg/dL      Creatinine 0.46 mg/dL      Sodium 144 mmol/L      Potassium 4.0 mmol/L      Chloride 109 mmol/L      CO2 24.1 mmol/L      Calcium 8.6 mg/dL      eGFR Non African Amer 126 mL/min/1.73      BUN/Creatinine Ratio 39.1     Anion Gap 10.9 mmol/L     Narrative:      GFR Normal >60  Chronic Kidney Disease <60  Kidney Failure <15      CBC & Differential [[id number]]  (Abnormal) Collected: 01/07/21 0624    Specimen: Blood Updated: 01/07/21 0632    Narrative:      The following orders were created for panel order CBC & Differential.  Procedure                               Abnormality         Status                     ---------                               -----------         ------                     CBC Auto Differential[[id number]]        Abnormal            Final result                 Please view results for these tests on the individual orders.    CBC Auto Differential [[id number]]  (Abnormal) Collected: 01/07/21 0624    Specimen: Blood Updated: 01/07/21 0632     WBC 7.56 10*3/mm3      RBC 2.85 10*6/mm3      Hemoglobin 8.6 g/dL      Hematocrit 26.4 %      MCV 92.6 fL      MCH 30.2 pg      MCHC 32.6 g/dL      RDW 14.8 %      RDW-SD 50.2 fl      MPV 10.8 fL       Platelets 240 10*3/mm3      Neutrophil % 85.5 %      Lymphocyte % 7.3 %      Monocyte % 6.2 %      Eosinophil % 0.0 %      Basophil % 0.1 %      Immature Grans % 0.9 %      Neutrophils, Absolute 6.46 10*3/mm3      Lymphocytes, Absolute 0.55 10*3/mm3      Monocytes, Absolute 0.47 10*3/mm3      Eosinophils, Absolute 0.00 10*3/mm3      Basophils, Absolute 0.01 10*3/mm3      Immature Grans, Absolute 0.07 10*3/mm3      nRBC 0.0 /100 WBC           I have reviewed the patient's laboratory results.    Radiology results:    Imaging Results (Last 24 Hours)     Procedure Component Value Units Date/Time    FL C Arm During Surgery [[id number]] Resulted: 01/06/21 1256     Updated: 01/06/21 1256    Narrative:      This procedure was auto-finalized with no dictation required.     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    Patient will need a 2-week follow-up with Dr. rasheed's office for re-evaluation.     Activity level:  Non-weightbearing of the right leg, and partial weightbearing on the left (if the left femur repeated xray is negative for acute fracture) and max assistance as necessary for transferring,    We will order a repeat stat x-ray of the left femur due to her continued complaint of left  thigh pain.. Of note-Dr. Lyons was very thorough and did order C arm images during the OR time of the entire left lower leg.  There was no acute fracture noted at that time    Martin Wolfe PA-C  01/07/21  12:08 EST

## 2021-01-07 NOTE — THERAPY EVALUATION
OT eval held pending xray left femur to check for fracture; will follow up tomorrow as appropriate.

## 2021-01-07 NOTE — NURSING NOTE
@ 1400 report received from PACU KRISH Rosales  @ 3970 patient brought back to room #311 in patient's bed per SDS PCT Lashaun Rosales RN-patient tolerated transfer-will continue to monitor

## 2021-01-07 NOTE — PROGRESS NOTES
Continued Stay Note   Chris     Patient Name: Stefania Kirkpatrick  MRN: [id number]  Today's Date: 1/7/2021    Admit Date: 1/4/2021    Discharge Plan     Row Name 01/07/21 1134       Plan    Plan Pt can return to [facility] [place].  Will need to notify daughter and have Covid within 72 hours.  13:49 EST  Called to daughter Alis and verified does want pt to go back to [facility].  Please notify her at discharge.  Imm explained and verbalized understanding.          Discharge Codes    No documentation.       Expected Discharge Date and Time     Expected Discharge Date Expected Discharge Time    Jan 8, 2021             Radha Santiago RN

## 2021-01-07 NOTE — PLAN OF CARE
Goal Outcome Evaluation:  Plan of Care Reviewed With: patient  Progress: no change-confused patient with right femur fracture-Dr. Lyons has patient scheduled for surgery at this time-monitor labs and continue to monitor patient

## 2021-01-08 PROBLEM — F02.80 LATE ONSET ALZHEIMER'S DISEASE WITHOUT BEHAVIORAL DISTURBANCE (HCC): Status: ACTIVE | Noted: 2021-01-08

## 2021-01-08 PROBLEM — G89.4 CHRONIC PAIN SYNDROME: Status: ACTIVE | Noted: 2021-01-08

## 2021-01-08 PROBLEM — D50.0 ANEMIA DUE TO BLOOD LOSS: Status: ACTIVE | Noted: 2021-01-08

## 2021-01-08 PROBLEM — N30.00 ACUTE CYSTITIS WITHOUT HEMATURIA: Status: ACTIVE | Noted: 2021-01-08

## 2021-01-08 PROBLEM — R62.7 ADULT FAILURE TO THRIVE SYNDROME: Status: ACTIVE | Noted: 2021-01-08

## 2021-01-08 PROBLEM — G30.1 LATE ONSET ALZHEIMER'S DISEASE WITHOUT BEHAVIORAL DISTURBANCE (HCC): Status: ACTIVE | Noted: 2021-01-08

## 2021-01-08 LAB
BASOPHILS # BLD AUTO: 0.02 10*3/MM3 (ref 0–0.2)
BASOPHILS NFR BLD AUTO: 0.3 % (ref 0–1.5)
DEPRECATED RDW RBC AUTO: 51.2 FL (ref 37–54)
EOSINOPHIL # BLD AUTO: 0.34 10*3/MM3 (ref 0–0.4)
EOSINOPHIL NFR BLD AUTO: 4.9 % (ref 0.3–6.2)
ERYTHROCYTE [DISTWIDTH] IN BLOOD BY AUTOMATED COUNT: 14.7 % (ref 12.3–15.4)
HCT VFR BLD AUTO: 26.3 % (ref 34–46.6)
HGB BLD-MCNC: 8.3 G/DL (ref 12–15.9)
IMM GRANULOCYTES # BLD AUTO: 0.1 10*3/MM3 (ref 0–0.05)
IMM GRANULOCYTES NFR BLD AUTO: 1.4 % (ref 0–0.5)
LYMPHOCYTES # BLD AUTO: 0.68 10*3/MM3 (ref 0.7–3.1)
LYMPHOCYTES NFR BLD AUTO: 9.7 % (ref 19.6–45.3)
MCH RBC QN AUTO: 29.9 PG (ref 26.6–33)
MCHC RBC AUTO-ENTMCNC: 31.6 G/DL (ref 31.5–35.7)
MCV RBC AUTO: 94.6 FL (ref 79–97)
MONOCYTES # BLD AUTO: 0.44 10*3/MM3 (ref 0.1–0.9)
MONOCYTES NFR BLD AUTO: 6.3 % (ref 5–12)
NEUTROPHILS NFR BLD AUTO: 5.43 10*3/MM3 (ref 1.7–7)
NEUTROPHILS NFR BLD AUTO: 77.4 % (ref 42.7–76)
NRBC BLD AUTO-RTO: 0 /100 WBC (ref 0–0.2)
PLATELET # BLD AUTO: 265 10*3/MM3 (ref 140–450)
PMV BLD AUTO: 10.8 FL (ref 6–12)
RBC # BLD AUTO: 2.78 10*6/MM3 (ref 3.77–5.28)
WBC # BLD AUTO: 7.01 10*3/MM3 (ref 3.4–10.8)

## 2021-01-08 PROCEDURE — 85025 COMPLETE CBC W/AUTO DIFF WBC: CPT | Performed by: INTERNAL MEDICINE

## 2021-01-08 PROCEDURE — 99232 SBSQ HOSP IP/OBS MODERATE 35: CPT | Performed by: INTERNAL MEDICINE

## 2021-01-08 PROCEDURE — 25010000002 MORPHINE SULFATE (PF) 2 MG/ML SOLUTION: Performed by: INTERNAL MEDICINE

## 2021-01-08 PROCEDURE — 25010000002 MORPHINE SULFATE (PF) 2 MG/ML SOLUTION: Performed by: ORTHOPAEDIC SURGERY

## 2021-01-08 RX ORDER — CELECOXIB 100 MG/1
100 CAPSULE ORAL DAILY
Status: DISCONTINUED | OUTPATIENT
Start: 2021-01-08 | End: 2021-01-14 | Stop reason: HOSPADM

## 2021-01-08 RX ORDER — MORPHINE SULFATE 2 MG/ML
2 INJECTION, SOLUTION INTRAMUSCULAR; INTRAVENOUS
Status: DISCONTINUED | OUTPATIENT
Start: 2021-01-08 | End: 2021-01-10

## 2021-01-08 RX ADMIN — MORPHINE SULFATE 2 MG: 2 INJECTION, SOLUTION INTRAMUSCULAR; INTRAVENOUS at 07:56

## 2021-01-08 RX ADMIN — SERTRALINE HYDROCHLORIDE 50 MG: 50 TABLET ORAL at 08:01

## 2021-01-08 RX ADMIN — CEFUROXIME AXETIL 250 MG: 250 TABLET ORAL at 21:00

## 2021-01-08 RX ADMIN — CEFUROXIME AXETIL 250 MG: 250 TABLET ORAL at 08:00

## 2021-01-08 RX ADMIN — CELECOXIB 100 MG: 100 CAPSULE ORAL at 16:30

## 2021-01-08 RX ADMIN — SODIUM CHLORIDE, PRESERVATIVE FREE 10 ML: 5 INJECTION INTRAVENOUS at 21:01

## 2021-01-08 RX ADMIN — SODIUM CHLORIDE, PRESERVATIVE FREE 3 ML: 5 INJECTION INTRAVENOUS at 08:01

## 2021-01-08 RX ADMIN — HYDROCODONE BITARTRATE AND ACETAMINOPHEN 1 TABLET: 5; 325 TABLET ORAL at 03:17

## 2021-01-08 RX ADMIN — MORPHINE SULFATE 2 MG: 2 INJECTION, SOLUTION INTRAMUSCULAR; INTRAVENOUS at 23:47

## 2021-01-08 RX ADMIN — SODIUM CHLORIDE, PRESERVATIVE FREE 3 ML: 5 INJECTION INTRAVENOUS at 21:01

## 2021-01-08 RX ADMIN — HYDROCODONE BITARTRATE AND ACETAMINOPHEN 1 TABLET: 5; 325 TABLET ORAL at 21:00

## 2021-01-08 RX ADMIN — APIXABAN 5 MG: 5 TABLET, FILM COATED ORAL at 08:00

## 2021-01-08 RX ADMIN — SODIUM CHLORIDE, POTASSIUM CHLORIDE, SODIUM LACTATE AND CALCIUM CHLORIDE 100 ML/HR: 600; 310; 30; 20 INJECTION, SOLUTION INTRAVENOUS at 00:15

## 2021-01-08 RX ADMIN — HYDROCODONE BITARTRATE AND ACETAMINOPHEN 1 TABLET: 5; 325 TABLET ORAL at 11:54

## 2021-01-08 RX ADMIN — AMLODIPINE BESYLATE 5 MG: 5 TABLET ORAL at 08:00

## 2021-01-08 RX ADMIN — SODIUM CHLORIDE, PRESERVATIVE FREE 10 ML: 5 INJECTION INTRAVENOUS at 08:01

## 2021-01-08 RX ADMIN — APIXABAN 5 MG: 5 TABLET, FILM COATED ORAL at 21:00

## 2021-01-08 RX ADMIN — MORPHINE SULFATE 2 MG: 2 INJECTION, SOLUTION INTRAMUSCULAR; INTRAVENOUS at 05:34

## 2021-01-08 RX ADMIN — SODIUM CHLORIDE, POTASSIUM CHLORIDE, SODIUM LACTATE AND CALCIUM CHLORIDE 100 ML/HR: 600; 310; 30; 20 INJECTION, SOLUTION INTRAVENOUS at 12:12

## 2021-01-08 RX ADMIN — MORPHINE SULFATE 2 MG: 2 INJECTION, SOLUTION INTRAMUSCULAR; INTRAVENOUS at 02:17

## 2021-01-08 NOTE — THERAPY EVALUATION
Attempted PT eval with patient. Upon entrance to patient's room, she is crying out in pain. Repositioned patient for comfort but she continues to cry out and is unable to follow any commands. Pt is NWB R LE and PWB L LE and so will be dependent for transfers. Due to patient's pain, cognitive status, WB restrictions, minimal prior functional level, and new failure to thrive diagnosis (with possible transition to hospice care), patient is not currently appropriate for skilled PT intervention. Recommend RN staff reposition patient for comfort and to prevent skin breakdown. D/C skilled PT order.

## 2021-01-08 NOTE — CONSULTS
Patient: Stefania Kirkpatrick  Procedure(s):  CLOSED REDUCTION AND CYLINDER CAST  DISTAL FEMUR RIGHT  Anesthesia type: MAC    Patient location: [facility] Surgical Floor  Last vitals:   Vitals:    01/08/21 0800   BP: 156/50   Pulse: 68   Resp: 18   Temp:    SpO2:      Level of consciousness: awake, alert and oriented    Post-anesthesia pain: adequate analgesia  Airway patency: patent  Respiratory: unassisted  Cardiovascular: stable  Hydration: euvolemic    Anesthetic complications: no

## 2021-01-08 NOTE — PROGRESS NOTES
Fracture of shaft of right femur (CMS/HCC) as a result it appears that she is now developed adult failure to thrive syndrome and will speak to family members about proceeding with hospice care unless her p.o. 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  She did reach for my face and pulled out my face mask.  Spoken with her nurse reports that there is been no fever sweats chills cough shortness of breath skin breakdowns falls behaviors pain or other.  P.o. intake though has been 0 and she tends to push away her food.      Vital Signs  Temp:  [97.7 °F (36.5 °C)-98.4 °F (36.9 °C)] 98.4 °F (36.9 °C)  Heart Rate:  [65-78] 68  Resp:  [18-20] 18  BP: (137-156)/(50-75) 156/50    Physical Exam:   General Appearance:   None verbal, in no acute distress   Head:    Normocephalic, without obvious abnormality, atraumatic       Throat:      Neck:     Back:        Lungs:    Few scattered rhonchi noted    Heart:   There is a regular rhythm without murmur S3   Chest Wall:       Abdomen:    Abdomen soft nontender no mass appreciated   Extremities:      Pulses:      Skin:      Lymph nodes:      Neurologic:            Results Review:     I reviewed the patient's new clinical results.    Lab Results (last 24 hours)     Procedure Component Value Units Date/Time    Urine Culture - Urine, Urine, Catheter In/Out [[id number]]  (Abnormal)  (Susceptibility) Collected: 01/06/21 1117    Specimen: Urine, Catheter In/Out Updated: 01/08/21 0905     Urine Culture >100,000 CFU/mL Escherichia coli      >100,000 CFU/mL Gram Positive Cocci    Susceptibility      Escherichia coli     BOB     Ampicillin Susceptible     Ampicillin + Sulbactam Susceptible     Cefazolin Susceptible     Cefepime Susceptible     Ceftazidime Susceptible      Ceftriaxone Susceptible     Gentamicin Susceptible     Levofloxacin Susceptible     Nitrofurantoin Susceptible     Piperacillin + Tazobactam Susceptible     Tetracycline Susceptible     Trimethoprim + Sulfamethoxazole Susceptible                    CBC & Differential [[id number]]  (Abnormal) Collected: 01/08/21 0645    Specimen: Blood Updated: 01/08/21 0730    Narrative:      The following orders were created for panel order CBC & Differential.  Procedure                               Abnormality         Status                     ---------                               -----------         ------                     CBC Auto Differential[[id number]]        Abnormal            Final result                 Please view results for these tests on the individual orders.    CBC Auto Differential [[id number]]  (Abnormal) Collected: 01/08/21 0645    Specimen: Blood Updated: 01/08/21 0730     WBC 7.01 10*3/mm3      RBC 2.78 10*6/mm3      Hemoglobin 8.3 g/dL      Hematocrit 26.3 %      MCV 94.6 fL      MCH 29.9 pg      MCHC 31.6 g/dL      RDW 14.7 %      RDW-SD 51.2 fl      MPV 10.8 fL      Platelets 265 10*3/mm3      Neutrophil % 77.4 %      Lymphocyte % 9.7 %      Monocyte % 6.3 %      Eosinophil % 4.9 %      Basophil % 0.3 %      Immature Grans % 1.4 %      Neutrophils, Absolute 5.43 10*3/mm3      Lymphocytes, Absolute 0.68 10*3/mm3      Monocytes, Absolute 0.44 10*3/mm3      Eosinophils, Absolute 0.34 10*3/mm3      Basophils, Absolute 0.02 10*3/mm3      Immature Grans, Absolute 0.10 10*3/mm3      nRBC 0.0 /100 WBC         Imaging Results (Last 24 Hours)     Procedure Component Value Units Date/Time    XR Femur 2 View Left [[id number]] Collected: 01/07/21 1308     Updated: 01/07/21 1310    Narrative:      PROCEDURE: XR FEMUR 2 VW LEFT-     HISTORY: previous fall with left thigh pain; S72.401A-Unspecified  fracture of lower end of right femur, initial encounter for closed  fracture; R09.02-Hypoxemia; Z91.81-History of falling;  G30.0-Alzheimer's  disease with early onset; F02.80-Dementia in other diseases classified  elsewhere without behavioral disturbance; S72.491A-Other fracture of  lower end of right femur, initial encounter for closed fractur     COMPARISON: January 3, 2021.     FINDINGS:  A 2 view exam demonstrates no acute fracture or dislocation.   The patient is status post right hip hemiarthroplasty. An intramedullary  debbi is seen in the left femur. The patient is also status post total  left knee arthroplasty.  No soft tissue abnormality is seen.       Impression:      No acute fracture. If clinical symptoms persist consider a  CT of the left hip.              This report was finalized on 1/7/2021 1:08 PM by Ross Bueno M.D..            Abilio Garcia MD  01/08/21  09:35 EST

## 2021-01-08 NOTE — PLAN OF CARE
Goal Outcome Evaluation:  Plan of Care Reviewed With: patient  Progress: no change  Outcome Summary: VSS att. Pt was a little more alert today but still called out frequently in pain. PRN meds given. Pt rested and was able to eat dinner. Will continue to monitor.

## 2021-01-08 NOTE — PLAN OF CARE
Goal Outcome Evaluation:  Plan of Care Reviewed With: patient  Progress: no change  Outcome Summary: VSS.  Pt continued to be confused and called out frequently in pain.  PRN meds were administered but did not stop pt from moaning and calling out.  Pt finally rested towards end of shift.  No acute changes in pt condition to report.  Will continue to monitor.

## 2021-01-08 NOTE — PLAN OF CARE
Goal Outcome Evaluation:  Plan of Care Reviewed With: patient  Progress: no change  Outcome Summary: pleasantly confused elderly patient from NH with bed alarm on at all times-IV fluids infusing-medications given per Dr. Astudillo's orders-monitor pain and reposition every two hours/PRN-monitor labs and continue to monitor patient

## 2021-01-08 NOTE — THERAPY EVALUATION
Patient not appropriate for OT eval. Patient is TD at SNF for all self care and mobility; patient with increased pain, unable to follow commands given advanced dementia and agitated. Patient to be positioned and turned in bed by nursing, no further OT needs at this time.

## 2021-01-09 LAB
ALBUMIN SERPL-MCNC: 3.1 G/DL (ref 3.5–5.2)
ANION GAP SERPL CALCULATED.3IONS-SCNC: 9.4 MMOL/L (ref 5–15)
BACTERIA SPEC AEROBE CULT: ABNORMAL
BACTERIA SPEC AEROBE CULT: ABNORMAL
BASOPHILS # BLD AUTO: 0.03 10*3/MM3 (ref 0–0.2)
BASOPHILS NFR BLD AUTO: 0.5 % (ref 0–1.5)
BUN SERPL-MCNC: 8 MG/DL (ref 8–23)
BUN/CREAT SERPL: 22.9 (ref 7–25)
CALCIUM SPEC-SCNC: 8.6 MG/DL (ref 8.2–9.6)
CHLORIDE SERPL-SCNC: 103 MMOL/L (ref 98–107)
CO2 SERPL-SCNC: 27.6 MMOL/L (ref 22–29)
CREAT SERPL-MCNC: 0.35 MG/DL (ref 0.57–1)
DEPRECATED RDW RBC AUTO: 49.6 FL (ref 37–54)
EOSINOPHIL # BLD AUTO: 0.41 10*3/MM3 (ref 0–0.4)
EOSINOPHIL NFR BLD AUTO: 6.3 % (ref 0.3–6.2)
ERYTHROCYTE [DISTWIDTH] IN BLOOD BY AUTOMATED COUNT: 14.6 % (ref 12.3–15.4)
GFR SERPL CREATININE-BSD FRML MDRD: >150 ML/MIN/1.73
GLUCOSE SERPL-MCNC: 110 MG/DL (ref 65–99)
HCT VFR BLD AUTO: 27.8 % (ref 34–46.6)
HGB BLD-MCNC: 8.9 G/DL (ref 12–15.9)
IMM GRANULOCYTES # BLD AUTO: 0.14 10*3/MM3 (ref 0–0.05)
IMM GRANULOCYTES NFR BLD AUTO: 2.2 % (ref 0–0.5)
LYMPHOCYTES # BLD AUTO: 0.57 10*3/MM3 (ref 0.7–3.1)
LYMPHOCYTES NFR BLD AUTO: 8.8 % (ref 19.6–45.3)
MCH RBC QN AUTO: 29.8 PG (ref 26.6–33)
MCHC RBC AUTO-ENTMCNC: 32 G/DL (ref 31.5–35.7)
MCV RBC AUTO: 93 FL (ref 79–97)
MONOCYTES # BLD AUTO: 0.42 10*3/MM3 (ref 0.1–0.9)
MONOCYTES NFR BLD AUTO: 6.5 % (ref 5–12)
NEUTROPHILS NFR BLD AUTO: 4.91 10*3/MM3 (ref 1.7–7)
NEUTROPHILS NFR BLD AUTO: 75.7 % (ref 42.7–76)
NRBC BLD AUTO-RTO: 0 /100 WBC (ref 0–0.2)
PHOSPHATE SERPL-MCNC: 2.6 MG/DL (ref 2.5–4.5)
PLATELET # BLD AUTO: 254 10*3/MM3 (ref 140–450)
PMV BLD AUTO: 9.7 FL (ref 6–12)
POTASSIUM SERPL-SCNC: 3.2 MMOL/L (ref 3.5–5.2)
RBC # BLD AUTO: 2.99 10*6/MM3 (ref 3.77–5.28)
SARS-COV-2 RNA PNL SPEC NAA+PROBE: NOT DETECTED
SODIUM SERPL-SCNC: 140 MMOL/L (ref 136–145)
WBC # BLD AUTO: 6.48 10*3/MM3 (ref 3.4–10.8)

## 2021-01-09 PROCEDURE — 99232 SBSQ HOSP IP/OBS MODERATE 35: CPT | Performed by: FAMILY MEDICINE

## 2021-01-09 PROCEDURE — 25010000002 MORPHINE SULFATE (PF) 2 MG/ML SOLUTION: Performed by: INTERNAL MEDICINE

## 2021-01-09 PROCEDURE — 85025 COMPLETE CBC W/AUTO DIFF WBC: CPT | Performed by: INTERNAL MEDICINE

## 2021-01-09 PROCEDURE — 80069 RENAL FUNCTION PANEL: CPT | Performed by: INTERNAL MEDICINE

## 2021-01-09 PROCEDURE — 87635 SARS-COV-2 COVID-19 AMP PRB: CPT | Performed by: FAMILY MEDICINE

## 2021-01-09 RX ADMIN — MORPHINE SULFATE 2 MG: 2 INJECTION, SOLUTION INTRAMUSCULAR; INTRAVENOUS at 06:50

## 2021-01-09 RX ADMIN — HYDROCODONE BITARTRATE AND ACETAMINOPHEN 1 TABLET: 5; 325 TABLET ORAL at 04:58

## 2021-01-09 RX ADMIN — DOCUSATE SODIUM 100 MG: 100 CAPSULE, LIQUID FILLED ORAL at 20:59

## 2021-01-09 RX ADMIN — SODIUM CHLORIDE, PRESERVATIVE FREE 3 ML: 5 INJECTION INTRAVENOUS at 09:51

## 2021-01-09 RX ADMIN — SODIUM CHLORIDE, PRESERVATIVE FREE 10 ML: 5 INJECTION INTRAVENOUS at 09:50

## 2021-01-09 RX ADMIN — SERTRALINE HYDROCHLORIDE 50 MG: 50 TABLET ORAL at 09:48

## 2021-01-09 RX ADMIN — CEFUROXIME AXETIL 250 MG: 250 TABLET ORAL at 09:48

## 2021-01-09 RX ADMIN — MORPHINE SULFATE 2 MG: 2 INJECTION, SOLUTION INTRAMUSCULAR; INTRAVENOUS at 13:13

## 2021-01-09 RX ADMIN — HYDROCODONE BITARTRATE AND ACETAMINOPHEN 1 TABLET: 5; 325 TABLET ORAL at 21:00

## 2021-01-09 RX ADMIN — HYDROCODONE BITARTRATE AND ACETAMINOPHEN 1 TABLET: 5; 325 TABLET ORAL at 09:49

## 2021-01-09 RX ADMIN — APIXABAN 5 MG: 5 TABLET, FILM COATED ORAL at 09:48

## 2021-01-09 RX ADMIN — CEFUROXIME AXETIL 250 MG: 250 TABLET ORAL at 20:59

## 2021-01-09 RX ADMIN — MORPHINE SULFATE 2 MG: 2 INJECTION, SOLUTION INTRAMUSCULAR; INTRAVENOUS at 22:23

## 2021-01-09 RX ADMIN — SODIUM CHLORIDE, POTASSIUM CHLORIDE, SODIUM LACTATE AND CALCIUM CHLORIDE 100 ML/HR: 600; 310; 30; 20 INJECTION, SOLUTION INTRAVENOUS at 21:00

## 2021-01-09 RX ADMIN — CELECOXIB 100 MG: 100 CAPSULE ORAL at 09:48

## 2021-01-09 RX ADMIN — APIXABAN 5 MG: 5 TABLET, FILM COATED ORAL at 20:59

## 2021-01-09 RX ADMIN — AMLODIPINE BESYLATE 5 MG: 5 TABLET ORAL at 09:48

## 2021-01-09 NOTE — PLAN OF CARE
Goal Outcome Evaluation:  Plan of Care Reviewed With: patient  Progress: no change  Outcome Summary: VSS.  Pt c/o pain that was controlled with PRN meds.  No acute changes in pt conditiont to report.  Will continue to monitor.

## 2021-01-09 NOTE — PROGRESS NOTES
[facility]    Name:  Stefania Kirkpatrick   Age:  89 y.o.  Sex:  female  :  1924  MRN:  [id number]   Visit Number:  [id number]  Admission Date:  2021  Date Of Service:  21  Primary Care Physician:  Abilio Garcia MD     LOS: 5 days :  Patient Care Team:  Abilio Garcia MD as PCP - General:    Chief Complaint:      Follow-up hip fracture    Subjective / Interval History:     Patient has had some bites of food including dinner yesterday, does not seem very interested today.  Pain seems to be well controlled however.    Review of Systems:     Complete review of systems currently unobtainable secondary to dementia    Vital Signs:    Temp:  [97.5 °F (36.4 °C)-98.6 °F (37 °C)] 97.6 °F (36.4 °C)  Heart Rate:  [65-72] 65  Resp:  [20] 20  BP: (122-158)/(51-96) 158/76    Intake and output:    I/O last 3 completed shifts:  In: 120 [P.O.:120]  Out: -   I/O this shift:  In: 480 [P.O.:480]  Out: -     Physical Examination:    General Appearance:  Alert and cooperative, not in any acute distress.  Frail appearing elderly female   Head:  Atraumatic and normocephalic, without obvious abnormality.   Eyes:          PERRLA, conjunctivae and sclerae normal, no Icterus. No pallor. Extraocular movements are within normal limits.   Neck: Supple, trachea midline, no thyromegaly.   Lungs:   Breath sounds heard bilaterally equally.  No crackles or wheezing. No Pleural rub or bronchial breathing.   Heart:  Normal S1 and S2, no murmur, no gallop, no rub. No JVD   Abdomen:   Normal bowel sounds, no masses, no organomegaly. Soft, non-tender, non-distended, no guarding, no rebound tenderness.   Extremities:  Right leg cast noted no edema, no cyanosis, no clubbing.   Skin: No bleeding, bruising or rash.   Neurologic:  Awake, arousable, confused.     Laboratory results:    Results from last 7 days   Lab Units 21  0734 21  0624 21  0547  21  2007   SODIUM  mmol/L 140 144 141   < > 136   POTASSIUM mmol/L 3.2* 4.0 3.6   < > 4.0   CHLORIDE mmol/L 103 109* 104   < > 101   CO2 mmol/L 27.6 24.1 25.1   < > 24.2   BUN mg/dL 8 18 19   < > 22   CREATININE mg/dL 0.35* 0.46* 0.48*   < > 0.66   CALCIUM mg/dL 8.6 8.6 8.7   < > 9.1   BILIRUBIN mg/dL  --   --  1.0  --  1.0   ALK PHOS U/L  --   --  53  --  57   ALT (SGPT) U/L  --   --  9  --  11   AST (SGOT) U/L  --   --  15  --  18   GLUCOSE mg/dL 110* 127* 135*   < > 210*    < > = values in this interval not displayed.     Results from last 7 days   Lab Units 01/09/21  0734 01/08/21  0645 01/07/21  0624   WBC 10*3/mm3 6.48 7.01 7.56   HEMOGLOBIN g/dL 8.9* 8.3* 8.6*   HEMATOCRIT % 27.8* 26.3* 26.4*   PLATELETS 10*3/mm3 254 265 240     Results from last 7 days   Lab Units 01/04/21 2007   INR  1.19*     Results from last 7 days   Lab Units 01/04/21 2007   TROPONIN T ng/mL <0.010     Results from last 7 days   Lab Units 01/06/21  1117   URINECX  >100,000 CFU/mL Escherichia coli*  >100,000 CFU/mL Streptococcus, Alpha Hemolytic*           I have reviewed the patient's laboratory results.    Radiology results:    Imaging Results (Last 24 Hours)     ** No results found for the last 24 hours. **          I have reviewed the patient's radiology reports.    Medication Review:     I have reviewed the patient's active and prn medications.       Fracture of shaft of right femur (CMS/HCC)    Status post fall    Essential hypertension    Closed fracture of right distal femur (CMS/HCC)    Acute cystitis without hematuria    Anemia due to blood loss    Adult failure to thrive syndrome    Late onset Alzheimer's disease without behavioral disturbance (CMS/HCC)    Chronic pain syndrome      Assessment:    1.  Closed fracture of distal end of right femur  2.  Status post fall  3.  Essential hypertension  4  Alzheimer's dementia  5.  Atelectasis  Six.  Anemia due to blood loss  Seven.  Acute cystitis without hematuria    Plan:    Patient is overall  hemodynamically stable, mildly hypertensive.  Has been able to take oral pain medications.  Continues on Ceftin for UTI.  Patient's appetite does seem to be overall improving.  She is able to eat and drink better tonight and in the morning, will likely discharge her back to nursing facility.  Appreciate orthopedic recommendations and management.    Stefani Astudillo DO  01/09/21  18:35 EST    Dictated utilizing Dragon dictation.

## 2021-01-10 ENCOUNTER — APPOINTMENT (OUTPATIENT)
Dept: CT IMAGING | Facility: HOSPITAL | Age: 86
End: 2021-01-10

## 2021-01-10 PROCEDURE — 73700 CT LOWER EXTREMITY W/O DYE: CPT

## 2021-01-10 PROCEDURE — 99232 SBSQ HOSP IP/OBS MODERATE 35: CPT | Performed by: FAMILY MEDICINE

## 2021-01-10 PROCEDURE — 25010000002 HYDROMORPHONE 1 MG/ML SOLUTION: Performed by: INTERNAL MEDICINE

## 2021-01-10 RX ORDER — CEFUROXIME AXETIL 250 MG/1
250 TABLET ORAL EVERY 12 HOURS SCHEDULED
Status: DISCONTINUED | OUTPATIENT
Start: 2021-01-10 | End: 2021-01-13

## 2021-01-10 RX ADMIN — SODIUM CHLORIDE, PRESERVATIVE FREE 10 ML: 5 INJECTION INTRAVENOUS at 09:14

## 2021-01-10 RX ADMIN — CELECOXIB 100 MG: 100 CAPSULE ORAL at 09:14

## 2021-01-10 RX ADMIN — CEFUROXIME AXETIL 250 MG: 250 TABLET ORAL at 09:14

## 2021-01-10 RX ADMIN — AMLODIPINE BESYLATE 5 MG: 5 TABLET ORAL at 09:15

## 2021-01-10 RX ADMIN — HYDROMORPHONE HYDROCHLORIDE 1 MG: 10 INJECTION, SOLUTION INTRAMUSCULAR; INTRAVENOUS; SUBCUTANEOUS at 04:09

## 2021-01-10 RX ADMIN — LACTULOSE: 10 SOLUTION ORAL; RECTAL at 14:56

## 2021-01-10 RX ADMIN — HYDROMORPHONE HYDROCHLORIDE 1 MG: 10 INJECTION, SOLUTION INTRAMUSCULAR; INTRAVENOUS; SUBCUTANEOUS at 20:36

## 2021-01-10 RX ADMIN — APIXABAN 5 MG: 5 TABLET, FILM COATED ORAL at 09:14

## 2021-01-10 RX ADMIN — CEFUROXIME AXETIL 250 MG: 250 TABLET ORAL at 20:34

## 2021-01-10 RX ADMIN — HYDROMORPHONE HYDROCHLORIDE 1 MG: 10 INJECTION, SOLUTION INTRAMUSCULAR; INTRAVENOUS; SUBCUTANEOUS at 14:59

## 2021-01-10 RX ADMIN — APIXABAN 5 MG: 5 TABLET, FILM COATED ORAL at 20:34

## 2021-01-10 RX ADMIN — HYDROMORPHONE HYDROCHLORIDE 1 MG: 10 INJECTION, SOLUTION INTRAMUSCULAR; INTRAVENOUS; SUBCUTANEOUS at 00:53

## 2021-01-10 RX ADMIN — SERTRALINE HYDROCHLORIDE 50 MG: 50 TABLET ORAL at 09:16

## 2021-01-10 RX ADMIN — SODIUM CHLORIDE, PRESERVATIVE FREE 3 ML: 5 INJECTION INTRAVENOUS at 09:16

## 2021-01-10 RX ADMIN — HYDROCODONE BITARTRATE AND ACETAMINOPHEN 1 TABLET: 5; 325 TABLET ORAL at 09:14

## 2021-01-10 NOTE — PLAN OF CARE
Goal Outcome Evaluation:  Plan of Care Reviewed With: patient  Progress: no change    Patient screaming and resistant to any movement of BLE, especially the non-fractured left leg. LLE with positional deformity. Patient does not tolerate turning, brief changes or any activity without screaming. IV pain medication given throughout shift. Morphine changed to Dilaudid IV per Dr Mauricio. Dr Mauricio recommends reconsulting orthopedic surgeon to provide further recommendations for LLE pain. It has been noted that the LLE was xrayed and was also evaluated/scanned by Dr Lyons during closed reduction of Right femur fracture several days ago.  IVF continued. Patient not tolerating much oral intake. A&O to self.

## 2021-01-10 NOTE — PROGRESS NOTES
[facility]    Name:  Stefania Kirkpatrick   Age:  89 y.o.  Sex:  female  :  1924  MRN:  [id number]   Visit Number:  [id number]  Admission Date:  2021  Date Of Service:  01/10/21  Primary Care Physician:  Abilio Garcia MD     LOS: 6 days :  Patient Care Team:  Abilio Garcia MD as PCP - General:    Chief Complaint:      Follow-up hip fracture    Subjective / Interval History:     Patient was resting at time of visit this morning.  Primarily nonverbal, denied any acute complaints.  She is apparently still having issues with pain with her lower extremities, primarily her left leg.  Per chart review, she has had issues with the left leg for an extended period of time.  Her appetite remains low.    Review of Systems:     Complete review of systems currently unobtainable secondary to dementia    Vital Signs:    Temp:  [96.9 °F (36.1 °C)-98.5 °F (36.9 °C)] 96.9 °F (36.1 °C)  Heart Rate:  [60-77] 70  Resp:  [18] 18  BP: (131-140)/(62-77) 135/62    Intake and output:    I/O last 3 completed shifts:  In: 1680 [P.O.:480; I.V.:1200]  Out: -   I/O this shift:  In: 60 [P.O.:60]  Out: -     Physical Examination:    General Appearance:  Alert and cooperative, not in any acute distress.  Frail appearing elderly female   Head:  Atraumatic and normocephalic, without obvious abnormality.   Eyes:          PERRLA, conjunctivae and sclerae normal, no Icterus. No pallor. Extraocular movements are within normal limits.   Neck: Supple, trachea midline, no thyromegaly.   Lungs:   Breath sounds heard bilaterally equally.  No crackles or wheezing. No Pleural rub or bronchial breathing.   Heart:  Normal S1 and S2, no murmur, no gallop, no rub. No JVD   Abdomen:   Normal bowel sounds, no masses, no organomegaly. Soft, non-tender, non-distended, no guarding, no rebound tenderness.   Extremities:  Right leg cast noted no edema, no cyanosis, no clubbing.  Pain with palpation of the  left thigh and hip region.   Skin: No bleeding, bruising or rash.   Neurologic:  Awake, arousable, confused.     Laboratory results:    Results from last 7 days   Lab Units 01/09/21  0734 01/07/21  0624 01/06/21  0547  01/04/21 2007   SODIUM mmol/L 140 144 141   < > 136   POTASSIUM mmol/L 3.2* 4.0 3.6   < > 4.0   CHLORIDE mmol/L 103 109* 104   < > 101   CO2 mmol/L 27.6 24.1 25.1   < > 24.2   BUN mg/dL 8 18 19   < > 22   CREATININE mg/dL 0.35* 0.46* 0.48*   < > 0.66   CALCIUM mg/dL 8.6 8.6 8.7   < > 9.1   BILIRUBIN mg/dL  --   --  1.0  --  1.0   ALK PHOS U/L  --   --  53  --  57   ALT (SGPT) U/L  --   --  9  --  11   AST (SGOT) U/L  --   --  15  --  18   GLUCOSE mg/dL 110* 127* 135*   < > 210*    < > = values in this interval not displayed.     Results from last 7 days   Lab Units 01/09/21  0734 01/08/21  0645 01/07/21  0624   WBC 10*3/mm3 6.48 7.01 7.56   HEMOGLOBIN g/dL 8.9* 8.3* 8.6*   HEMATOCRIT % 27.8* 26.3* 26.4*   PLATELETS 10*3/mm3 254 265 240     Results from last 7 days   Lab Units 01/04/21 2007   INR  1.19*     Results from last 7 days   Lab Units 01/04/21 2007   TROPONIN T ng/mL <0.010     Results from last 7 days   Lab Units 01/06/21  1117   URINECX  >100,000 CFU/mL Escherichia coli*  >100,000 CFU/mL Streptococcus, Alpha Hemolytic*           I have reviewed the patient's laboratory results.    Radiology results:    Imaging Results (Last 24 Hours)     Procedure Component Value Units Date/Time    CT Lower Extremity Left Without Contrast [[id number]] Collected: 01/10/21 1130     Updated: 01/10/21 1150    Narrative:      PROCEDURE: CT LOWER EXTREMITY LEFT WO CONTRAST-     HISTORY: Lower extremity vascular trauma     COMPARISON: None.     PROCEDURE: Axial images were obtained through the left femur by computed  tomography.  Sagittal and coronal reconstruction images were performed .  This study was performed with techniques to keep radiation doses as low  as reasonably achievable, (ALARA). Individualized  dose reduction  techniques using automated exposure control or adjustment of mA and/or  kV according to the patient size were employed.      FINDINGS: There is no acute fracture. No loose bodies are identified.  Moderate, diffuse osteopenia identified. Patient is status post  intramedullary debbi and distal fixation screws for a remote distal left  femoral diaphyseal fracture which shows smooth periosteal reaction from  remote healing. Streak artifact from the hardware noted. There is severe  degenerative change of the left hip with significant narrowing and  osteophyte formation. Left hip effusion noted. There is also distention  of the rectum to 9 cm in diameter and mild wall thickening consistent  with stercoral inflammation from fecal impaction. Streak artifact from  right hip arthroplasty noted. Uterus and bladder are displaced  anteriorly by the dilated rectum. There is an 8 cm oval, circumscribed  hypodense lesion anterior and left lateral to the rectum which appears  to be separate from the bladder and may represent an ovarian lesion. CT  of the abdomen and pelvis with IV and oral contrast recommended for  further evaluation on a nonemergent basis. Moderate vascular  calcifications noted.       Impression:      Healed remote distal left femoral fracture status post ORIF;  no acute fracture seen     Probable fecal impaction with stercoral inflammation.     8 cm cystic lesion left side of the pelvis may be ovarian in origin.  Consider CT abdomen and pelvis with IV and oral contrast or pelvic  ultrasound on a nonemergent basis..     This report was finalized on 1/10/2021 11:48 AM by Rizwana Batista MD.          I have reviewed the patient's radiology reports.    Medication Review:     I have reviewed the patient's active and prn medications.       Fracture of shaft of right femur (CMS/HCC)    Status post fall    Essential hypertension    Closed fracture of right distal femur (CMS/HCC)    Acute cystitis without  hematuria    Anemia due to blood loss    Adult failure to thrive syndrome    Late onset Alzheimer's disease without behavioral disturbance (CMS/HCC)    Chronic pain syndrome      Assessment:    1.  Closed fracture of distal end of right femur  2.  Status post fall  3.  Essential hypertension  4  Alzheimer's dementia  5.  Atelectasis  Six.  Anemia due to blood loss  Seven.  Acute cystitis without hematuria    Plan:    Patient is overall hemodynamically stable.  She is on Ceftin for UTI.  Primary issue has been pain control and oral intake.  She has had negative x-ray imaging of her left hip for any acute process.  Orthopedics was reconsulted.  She did have a CT scan of the left lower extremity which demonstrated a healed remote distal left femoral fracture status post ORIF with no acute fracture.  Was mention of left hip effusion.  Fecal impaction.  Nonspecific 8 cm cystic lesion in the left side of the pelvis recommend follow-up on nonemergent basis.  Have added an enema today for the constipation issue.  Would suspect there is nothing surgically to be done for the left hip.  Will follow up on orthopedic recommendations.    Anticipate patient will be able to be discharged back to nursing facility once oral intake and pain control has been adequate.    Stefani Astudillo DO  01/10/21  12:16 EST    Dictated utilizing Dragon dictation.

## 2021-01-11 LAB
ANION GAP SERPL CALCULATED.3IONS-SCNC: 13.2 MMOL/L (ref 5–15)
BUN SERPL-MCNC: 4 MG/DL (ref 8–23)
BUN/CREAT SERPL: 9.5 (ref 7–25)
CALCIUM SPEC-SCNC: 8.9 MG/DL (ref 8.2–9.6)
CHLORIDE SERPL-SCNC: 101 MMOL/L (ref 98–107)
CO2 SERPL-SCNC: 24.8 MMOL/L (ref 22–29)
CREAT SERPL-MCNC: 0.42 MG/DL (ref 0.57–1)
DEPRECATED RDW RBC AUTO: 49.2 FL (ref 37–54)
ERYTHROCYTE [DISTWIDTH] IN BLOOD BY AUTOMATED COUNT: 14.6 % (ref 12.3–15.4)
GFR SERPL CREATININE-BSD FRML MDRD: 140 ML/MIN/1.73
GLUCOSE SERPL-MCNC: 123 MG/DL (ref 65–99)
HCT VFR BLD AUTO: 29.8 % (ref 34–46.6)
HGB BLD-MCNC: 9.5 G/DL (ref 12–15.9)
MCH RBC QN AUTO: 30 PG (ref 26.6–33)
MCHC RBC AUTO-ENTMCNC: 31.9 G/DL (ref 31.5–35.7)
MCV RBC AUTO: 94 FL (ref 79–97)
PLATELET # BLD AUTO: 380 10*3/MM3 (ref 140–450)
PMV BLD AUTO: 9.9 FL (ref 6–12)
POTASSIUM SERPL-SCNC: 3.7 MMOL/L (ref 3.5–5.2)
RBC # BLD AUTO: 3.17 10*6/MM3 (ref 3.77–5.28)
SODIUM SERPL-SCNC: 139 MMOL/L (ref 136–145)
WBC # BLD AUTO: 8.28 10*3/MM3 (ref 3.4–10.8)

## 2021-01-11 PROCEDURE — 99232 SBSQ HOSP IP/OBS MODERATE 35: CPT | Performed by: INTERNAL MEDICINE

## 2021-01-11 PROCEDURE — 99024 POSTOP FOLLOW-UP VISIT: CPT | Performed by: PHYSICIAN ASSISTANT

## 2021-01-11 PROCEDURE — 85027 COMPLETE CBC AUTOMATED: CPT | Performed by: FAMILY MEDICINE

## 2021-01-11 PROCEDURE — 80048 BASIC METABOLIC PNL TOTAL CA: CPT | Performed by: FAMILY MEDICINE

## 2021-01-11 PROCEDURE — 25010000002 HYDROMORPHONE 1 MG/ML SOLUTION: Performed by: INTERNAL MEDICINE

## 2021-01-11 PROCEDURE — 99232 SBSQ HOSP IP/OBS MODERATE 35: CPT | Performed by: PHYSICIAN ASSISTANT

## 2021-01-11 PROCEDURE — 25010000002 MORPHINE SULFATE (PF) 2 MG/ML SOLUTION: Performed by: INTERNAL MEDICINE

## 2021-01-11 RX ORDER — MORPHINE SULFATE 2 MG/ML
2 INJECTION, SOLUTION INTRAMUSCULAR; INTRAVENOUS
Status: DISCONTINUED | OUTPATIENT
Start: 2021-01-11 | End: 2021-01-14 | Stop reason: HOSPADM

## 2021-01-11 RX ADMIN — HYDROMORPHONE HYDROCHLORIDE 1 MG: 10 INJECTION, SOLUTION INTRAMUSCULAR; INTRAVENOUS; SUBCUTANEOUS at 06:49

## 2021-01-11 RX ADMIN — MORPHINE SULFATE 2 MG: 2 INJECTION, SOLUTION INTRAMUSCULAR; INTRAVENOUS at 09:44

## 2021-01-11 RX ADMIN — SODIUM CHLORIDE, PRESERVATIVE FREE 3 ML: 5 INJECTION INTRAVENOUS at 20:55

## 2021-01-11 RX ADMIN — MORPHINE SULFATE 2 MG: 2 INJECTION, SOLUTION INTRAMUSCULAR; INTRAVENOUS at 11:33

## 2021-01-11 RX ADMIN — CEFUROXIME AXETIL 250 MG: 250 TABLET ORAL at 20:53

## 2021-01-11 RX ADMIN — MORPHINE SULFATE 2 MG: 2 INJECTION, SOLUTION INTRAMUSCULAR; INTRAVENOUS at 20:47

## 2021-01-11 RX ADMIN — APIXABAN 5 MG: 5 TABLET, FILM COATED ORAL at 20:53

## 2021-01-11 RX ADMIN — MORPHINE SULFATE 2 MG: 2 INJECTION, SOLUTION INTRAMUSCULAR; INTRAVENOUS at 21:57

## 2021-01-11 RX ADMIN — SODIUM CHLORIDE, PRESERVATIVE FREE 10 ML: 5 INJECTION INTRAVENOUS at 20:55

## 2021-01-11 RX ADMIN — SODIUM CHLORIDE, PRESERVATIVE FREE 3 ML: 5 INJECTION INTRAVENOUS at 09:01

## 2021-01-11 RX ADMIN — MORPHINE SULFATE 2 MG: 2 INJECTION, SOLUTION INTRAMUSCULAR; INTRAVENOUS at 18:04

## 2021-01-11 RX ADMIN — SODIUM CHLORIDE, PRESERVATIVE FREE 10 ML: 5 INJECTION INTRAVENOUS at 09:01

## 2021-01-11 RX ADMIN — HYDROMORPHONE HYDROCHLORIDE 1 MG: 10 INJECTION, SOLUTION INTRAMUSCULAR; INTRAVENOUS; SUBCUTANEOUS at 00:20

## 2021-01-11 RX ADMIN — SODIUM CHLORIDE, POTASSIUM CHLORIDE, SODIUM LACTATE AND CALCIUM CHLORIDE 30 ML/HR: 600; 310; 30; 20 INJECTION, SOLUTION INTRAVENOUS at 18:17

## 2021-01-11 NOTE — PLAN OF CARE
Goal Outcome Evaluation:  Plan of Care Reviewed With: patient  Progress: no change  Outcome Summary: Vitals Signs Stable. Patient complaints of pain controlled with PRN medications given. See MAR. No acute chnages or events noted. Will continue to monitor.

## 2021-01-11 NOTE — PROGRESS NOTES
[facility]  PROGRESS NOTE    Name:  Stefania Kirkpatrick   Age:  89 y.o.  Sex:  female  :  1924  MRN:  [id number]   Visit Number:  [id number]  Admission Date:  2021  Date Of Service:  21  Primary Care Physician:  Abilio Garcia MD     LOS: 7 days :  Patient Care Team:  Abilio Garcia MD as PCP - General:    Chief Complaint:      Orthopedics has been requested to reevaluate the bilateral lower extremity pain.  Patient is currently being treated for a right periprosthetic fracture with a cylinder cast.  Patient has had x-rays of the left femur as well as a CT scan which reveals no acute fracture.  There is severe left hip degenerative changes.  Subjective / Interval History:     Patient is lying in bed resting.  When I externally and internally palpate the left thigh she does grimace in pain.  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  [facility] states she has no Ms. Kirkpatrick for some time and has had the chronic contracture even before the right femur injury.        Review of Systems:   The review of systems are unobtainable due to the patient's clinical condition      Vital Signs:    Temp:  [97 °F (36.1 °C)-98.3 °F (36.8 °C)] 98 °F (36.7 °C)  Heart Rate:  [60-74] 66  Resp:  [18] 18  BP: (111-165)/(53-81) 145/81    Intake and output:    I/O last 3 completed shifts:  In: 1440 [P.O.:240; I.V.:1200]  Out: 0   I/O this shift:  In: 60 [P.O.:60]  Out: -     Physical Examination:    General Appearance:    Alert, cooperative, and no acute distress.   Head:    Atraumatic and normocephalic, without obvious abnormality.   Eyes:    Extraocular movements are within normal limits.   ENT:   No acute change.   Neck:   Supple, trachea midline.   Lungs:     Normal respirations, unlabored.    Heart:    Regular rate.   Abdomen:     Soft, nondistended.   Extremities:   No new motor deficit, no calf pain,  Homans sign negative.  Patient does have a chronic left hip contracture.  The cylinder cast is intact distal pulses are within normal limits   Skin:     No rash.          Neurologic:   Sensation intact, pulses intact.     Laboratory results:    Lab Results (last 24 hours)     Procedure Component Value Units Date/Time    Basic Metabolic Panel [[id number]]  (Abnormal) Collected: 01/11/21 0641    Specimen: Blood Updated: 01/11/21 0733     Glucose 123 mg/dL      BUN 4 mg/dL      Creatinine 0.42 mg/dL      Sodium 139 mmol/L      Potassium 3.7 mmol/L      Chloride 101 mmol/L      CO2 24.8 mmol/L      Calcium 8.9 mg/dL      eGFR Non African Amer 140 mL/min/1.73      BUN/Creatinine Ratio 9.5     Anion Gap 13.2 mmol/L     Narrative:      GFR Normal >60  Chronic Kidney Disease <60  Kidney Failure <15      CBC (No Diff) [[id number]]  (Abnormal) Collected: 01/11/21 0641    Specimen: Blood Updated: 01/11/21 0704     WBC 8.28 10*3/mm3      RBC 3.17 10*6/mm3      Hemoglobin 9.5 g/dL      Hematocrit 29.8 %      MCV 94.0 fL      MCH 30.0 pg      MCHC 31.9 g/dL      RDW 14.6 %      RDW-SD 49.2 fl      MPV 9.9 fL      Platelets 380 10*3/mm3           I have reviewed the patient's laboratory results.    Radiology results:    Imaging Results (Last 24 Hours)     ** No results found for the last 24 hours. **             HISTORY: Lower extremity vascular trauma     COMPARISON: None.     PROCEDURE: Axial images were obtained through the left femur by computed  tomography.  Sagittal and coronal reconstruction images were performed .  This study was performed with techniques to keep radiation doses as low  as reasonably achievable, (ALARA). Individualized dose reduction  techniques using automated exposure control or adjustment of mA and/or  kV according to the patient size were employed.      FINDINGS: There is no acute fracture. No loose bodies are identified.  Moderate, diffuse osteopenia identified. Patient is status post  intramedullary  debbi and distal fixation screws for a remote distal left  femoral diaphyseal fracture which shows smooth periosteal reaction from  remote healing. Streak artifact from the hardware noted. There is severe  degenerative change of the left hip with significant narrowing and  osteophyte formation. Left hip effusion noted. There is also distention  of the rectum to 9 cm in diameter and mild wall thickening consistent  with stercoral inflammation from fecal impaction. Streak artifact from  right hip arthroplasty noted. Uterus and bladder are displaced  anteriorly by the dilated rectum. There is an 8 cm oval, circumscribed  hypodense lesion anterior and left lateral to the rectum which appears  to be separate from the bladder and may represent an ovarian lesion. CT  of the abdomen and pelvis with IV and oral contrast recommended for  further evaluation on a nonemergent basis. Moderate vascular  calcifications noted.     IMPRESSION:  Healed remote distal left femoral fracture status post ORIF;  no acute fracture seen     Probable fecal impaction with stercoral inflammation.     8 cm cystic lesion left side of the pelvis may be ovarian in origin.  Consider CT abdomen and pelvis with IV and oral contrast or pelvic  ultrasound on a nonemergent basis..     This report was finalized on 1/10/2021 11:48 AM by Rizwana Batista MD.    Assessment/Plan      Fracture of shaft of right femur (CMS/HCC)    Status post fall    Essential hypertension    Closed fracture of right distal femur (CMS/HCC)    Acute cystitis without hematuria    Anemia due to blood loss    Adult failure to thrive syndrome    Late onset Alzheimer's disease without behavioral disturbance (CMS/HCC)    Chronic pain syndrome      Left hip pain due to severe OA:    Plan for a fluoroscopy guided hip injection on 1/12/2021.    Because the patient does not have an acute fracture or periprosthetic fracture we can proceed with fluoroscopy guided hip injection in hopes this will  help with her osteoarthritis pain.    Patient will need follow-up repeat x-ray imaging in 2 weeks of the right femur and to check the cast placement.  Continue current management per the detailed orders and instructions, including skin, safety and fall precautions, respiratory therapy with incentive spirometer, advance diet as tolerated, bowel regimen, multi-modal analgesia, DVT prophylaxis, Hospitalist medical management, PT/OT following post-surgical rehab protocol, and Case Management for discharge and rehabilitation plans.    Martin Wolfe PA-C  01/11/21  13:10 EST

## 2021-01-11 NOTE — PROGRESS NOTES
Fracture of shaft of right femur (CMS/HCC)    Status post fall    Essential hypertension    Closed fracture of right distal femur (CMS/HCC)    Acute cystitis without hematuria    Anemia due to blood loss    Adult failure to thrive syndrome there is no oral intake of late she appears to be in pain I spoken with her daughter Alis Kirkpatrick we both agree that if she does not dramatically improve after injection of her hip that we should proceed with hospice care and end-of-life management    Late onset Alzheimer's disease without behavioral disturbance (CMS/HCC)    Chronic pain syndrome          Subjective:  Zero oral intake is been reported by nursing staff patient not been able to take her oral medications either.  There is recurrent hypoxemia but no reports of shortness of breath chest pain or cough.  She is in obvious pain grimacing and reaching out.  Therefore I have recommended to be given parenteral morphine on a as needed basis.  In addition she has been reevaluated by orthopedic surgery there is a plan to inject her right hip tomorrow.      Vital Signs  Temp:  [97 °F (36.1 °C)-98.3 °F (36.8 °C)] 98 °F (36.7 °C)  Heart Rate:  [60-74] 66  Resp:  [18] 18  BP: (111-165)/(53-81) 145/81    Physical Exam:   General Appearance:   He is nonverbal but appears to be in pain with grimacing and attempts to reach out   Head:    Normocephalic, without obvious abnormality, atraumatic       Throat:      Neck:     Back:        Lungs:    The lungs are clear without rales rhonchi or wheeze    Heart:   There is a regular rhythm without murmur S3   Chest Wall:       Abdomen:        Extremities:  Cast is in place in the right lower extremity there is no left lower extremity edema   Pulses:      Skin:      Lymph nodes:      Neurologic:            Results Review:     I reviewed the patient's new clinical results.    Lab Results (last 24 hours)     Procedure Component Value Units Date/Time    Basic Metabolic Panel [[id number]]   (Abnormal) Collected: 01/11/21 0641    Specimen: Blood Updated: 01/11/21 0733     Glucose 123 mg/dL      BUN 4 mg/dL      Creatinine 0.42 mg/dL      Sodium 139 mmol/L      Potassium 3.7 mmol/L      Chloride 101 mmol/L      CO2 24.8 mmol/L      Calcium 8.9 mg/dL      eGFR Non African Amer 140 mL/min/1.73      BUN/Creatinine Ratio 9.5     Anion Gap 13.2 mmol/L     Narrative:      GFR Normal >60  Chronic Kidney Disease <60  Kidney Failure <15      CBC (No Diff) [[id number]]  (Abnormal) Collected: 01/11/21 0641    Specimen: Blood Updated: 01/11/21 0704     WBC 8.28 10*3/mm3      RBC 3.17 10*6/mm3      Hemoglobin 9.5 g/dL      Hematocrit 29.8 %      MCV 94.0 fL      MCH 30.0 pg      MCHC 31.9 g/dL      RDW 14.6 %      RDW-SD 49.2 fl      MPV 9.9 fL      Platelets 380 10*3/mm3         Imaging Results (Last 24 Hours)     ** No results found for the last 24 hours. **            Abilio Garcia MD  01/11/21  15:38 EST

## 2021-01-11 NOTE — PLAN OF CARE
Goal Outcome Evaluation:  Plan of Care Reviewed With: patient  Progress: no change   Patient moaning, pain meds changed per MD to morphine, patient now resting comfortably with this addition, will continue to monitor and notify MD for issues or concerns

## 2021-01-11 NOTE — PROGRESS NOTES
Continued Stay Note   Chris     Patient Name: Stefania Kirkpatrick  MRN: [id number]  Today's Date: 1/11/2021    Admit Date: 1/4/2021    Discharge Plan     Row Name 01/11/21 1113       Plan    Plan  spoke to Alis Kirkpatrick pt daughter plan is to return  to [facility] and rehab when discharged        Discharge Codes    No documentation.       Expected Discharge Date and Time     Expected Discharge Date Expected Discharge Time    Jan 9, 2021             Alis Burns RN

## 2021-01-12 ENCOUNTER — APPOINTMENT (OUTPATIENT)
Dept: GENERAL RADIOLOGY | Facility: HOSPITAL | Age: 86
End: 2021-01-12

## 2021-01-12 LAB — SARS-COV-2 RNA PNL SPEC NAA+PROBE: NOT DETECTED

## 2021-01-12 PROCEDURE — 25010000002 METHYLPREDNISOLONE PER 80 MG: Performed by: INTERNAL MEDICINE

## 2021-01-12 PROCEDURE — 87635 SARS-COV-2 COVID-19 AMP PRB: CPT | Performed by: INTERNAL MEDICINE

## 2021-01-12 PROCEDURE — 77002 NEEDLE LOCALIZATION BY XRAY: CPT | Performed by: ORTHOPAEDIC SURGERY

## 2021-01-12 PROCEDURE — 99232 SBSQ HOSP IP/OBS MODERATE 35: CPT | Performed by: INTERNAL MEDICINE

## 2021-01-12 PROCEDURE — 3E0U3NZ INTRODUCTION OF ANALGESICS, HYPNOTICS, SEDATIVES INTO JOINTS, PERCUTANEOUS APPROACH: ICD-10-PCS | Performed by: INTERNAL MEDICINE

## 2021-01-12 PROCEDURE — 77002 NEEDLE LOCALIZATION BY XRAY: CPT

## 2021-01-12 PROCEDURE — 25010000002 MORPHINE SULFATE (PF) 2 MG/ML SOLUTION: Performed by: INTERNAL MEDICINE

## 2021-01-12 PROCEDURE — 20610 DRAIN/INJ JOINT/BURSA W/O US: CPT | Performed by: ORTHOPAEDIC SURGERY

## 2021-01-12 PROCEDURE — 25010000003 LIDOCAINE 1 % SOLUTION: Performed by: INTERNAL MEDICINE

## 2021-01-12 RX ORDER — LIDOCAINE HYDROCHLORIDE 10 MG/ML
INJECTION, SOLUTION EPIDURAL; INFILTRATION; INTRACAUDAL; PERINEURAL
Status: DISPENSED
Start: 2021-01-12 | End: 2021-01-12

## 2021-01-12 RX ORDER — LIDOCAINE HYDROCHLORIDE 10 MG/ML
9 INJECTION, SOLUTION INFILTRATION; PERINEURAL ONCE
Status: DISCONTINUED | OUTPATIENT
Start: 2021-01-12 | End: 2021-01-12 | Stop reason: SDUPTHER

## 2021-01-12 RX ORDER — METHYLPREDNISOLONE ACETATE 80 MG/ML
INJECTION, SUSPENSION INTRA-ARTICULAR; INTRALESIONAL; INTRAMUSCULAR; SOFT TISSUE
Status: DISPENSED
Start: 2021-01-12 | End: 2021-01-12

## 2021-01-12 RX ORDER — FENTANYL 25 UG/H
1 PATCH TRANSDERMAL
Status: DISCONTINUED | OUTPATIENT
Start: 2021-01-12 | End: 2021-01-14 | Stop reason: HOSPADM

## 2021-01-12 RX ORDER — METHYLPREDNISOLONE ACETATE 80 MG/ML
80 INJECTION, SUSPENSION INTRA-ARTICULAR; INTRALESIONAL; INTRAMUSCULAR; SOFT TISSUE ONCE
Status: DISCONTINUED | OUTPATIENT
Start: 2021-01-12 | End: 2021-01-12 | Stop reason: SDUPTHER

## 2021-01-12 RX ADMIN — CEFUROXIME AXETIL 250 MG: 250 TABLET ORAL at 08:32

## 2021-01-12 RX ADMIN — MORPHINE SULFATE 2 MG: 2 INJECTION, SOLUTION INTRAMUSCULAR; INTRAVENOUS at 12:35

## 2021-01-12 RX ADMIN — MORPHINE SULFATE 2 MG: 2 INJECTION, SOLUTION INTRAMUSCULAR; INTRAVENOUS at 11:32

## 2021-01-12 RX ADMIN — LIDOCAINE HYDROCHLORIDE 9 ML: 10 INJECTION, SOLUTION INFILTRATION; PERINEURAL at 11:25

## 2021-01-12 RX ADMIN — MORPHINE SULFATE 2 MG: 2 INJECTION, SOLUTION INTRAMUSCULAR; INTRAVENOUS at 17:37

## 2021-01-12 RX ADMIN — METHYLPREDNISOLONE ACETATE 80 MG: 80 INJECTION, SUSPENSION INTRA-ARTICULAR; INTRALESIONAL; INTRAMUSCULAR; SOFT TISSUE at 11:23

## 2021-01-12 RX ADMIN — DOCUSATE SODIUM 100 MG: 100 CAPSULE, LIQUID FILLED ORAL at 08:32

## 2021-01-12 RX ADMIN — AMLODIPINE BESYLATE 5 MG: 5 TABLET ORAL at 08:32

## 2021-01-12 RX ADMIN — MORPHINE SULFATE 2 MG: 2 INJECTION, SOLUTION INTRAMUSCULAR; INTRAVENOUS at 22:26

## 2021-01-12 RX ADMIN — SODIUM CHLORIDE, PRESERVATIVE FREE 3 ML: 5 INJECTION INTRAVENOUS at 20:34

## 2021-01-12 RX ADMIN — HYDROCODONE BITARTRATE AND ACETAMINOPHEN 1 TABLET: 5; 325 TABLET ORAL at 08:32

## 2021-01-12 RX ADMIN — FENTANYL 1 PATCH: 25 PATCH TRANSDERMAL at 14:46

## 2021-01-12 RX ADMIN — MORPHINE SULFATE 2 MG: 2 INJECTION, SOLUTION INTRAMUSCULAR; INTRAVENOUS at 23:53

## 2021-01-12 RX ADMIN — APIXABAN 5 MG: 5 TABLET, FILM COATED ORAL at 08:32

## 2021-01-12 RX ADMIN — APIXABAN 5 MG: 5 TABLET, FILM COATED ORAL at 20:34

## 2021-01-12 RX ADMIN — SODIUM CHLORIDE, PRESERVATIVE FREE 10 ML: 5 INJECTION INTRAVENOUS at 20:34

## 2021-01-12 RX ADMIN — CEFUROXIME AXETIL 250 MG: 250 TABLET ORAL at 20:34

## 2021-01-12 RX ADMIN — CELECOXIB 100 MG: 100 CAPSULE ORAL at 08:32

## 2021-01-12 RX ADMIN — MORPHINE SULFATE 2 MG: 2 INJECTION, SOLUTION INTRAMUSCULAR; INTRAVENOUS at 05:29

## 2021-01-12 RX ADMIN — TRAMADOL HYDROCHLORIDE 50 MG: 50 TABLET, FILM COATED ORAL at 12:30

## 2021-01-12 RX ADMIN — HYDROCODONE BITARTRATE AND ACETAMINOPHEN 1 TABLET: 5; 325 TABLET ORAL at 20:34

## 2021-01-12 NOTE — PROGRESS NOTES
"Adult Nutrition  Assessment/PES    Patient Name:  Stefania Kirkpatrick  YOB: 1924  MRN: [id number]  Admit Date:  1/4/2021    Assessment Date:  1/12/2021    Comments:    Recommend:  1. Continue current diet order as medically appropriate and tolerated.  2. Establish and encourage PO intake. PO intake ~25% x 1 meal. Multiple meal refusals noted.  3. RD ordered Boost Plus daily, Boost Pudding daily, a Magic Cup daily, and a Mighty Shake daily.  4. Consider a multivitamin with minerals daily.  5. Continue to monitor and replace electrolytes PRN.     RD to follow pt and available PRN.      Reason for Assessment     Row Name 01/12/21 1519          Reason for Assessment    Reason For Assessment  identified at risk by screening criteria     Diagnosis  trauma;cardiac disease;neurologic conditions Broken leg, HTN< fracture of R distal femur, Bladder infection, Blood loss anemia, Adult FTT, Alzheimer's disease     Identified At Risk by Screening Criteria  other (see comments);reduced oral intake over the last month Pt admitted for 7+ days           Anthropometrics     Row Name 01/12/21 1521          Anthropometrics    Height  142.2 cm (56\")        Ideal Body Weight (IBW)    Ideal Body Weight (IBW) (kg)  36.72         Labs/Tests/Procedures/Meds     Row Name 01/12/21 1520          Labs/Procedures/Meds    Lab Results Reviewed  reviewed, pertinent     Lab Results Comments  Low: Cr, BUN, Alb High: Gluc        Medications    Pertinent Medications Reviewed  reviewed         Physical Findings     Row Name 01/12/21 1521          Physical Findings    Skin  other (see comments) R anterior leg         Estimated/Assessed Needs     Row Name 01/12/21 1521          Calculation Measurements    Weight Used For Calculations  43.1 kg (95 lb) Adjusted BW     Height  142.2 cm (56\")        Estimated/Assessed Needs    Additional Documentation  Calorie Requirements (Group);Protein Requirements (Group);[place]-St. Jeor Equation (Group);Fluid " Requirements (Group)        Calorie Requirements    Estimated Calorie Need Method  [place]-St Jeor     Estimated Calorie Requirement Comment  1200 - 1400        [place]-St. Jeor Equation    RMR ([place]-St. Jeor Equation)  678.92     [place]-St. Jeor Activity Factors  1.4 - 1.5     Activity Factors ([place]-St. Jeor)  950.488 - 1018.38        Protein Requirements    Weight Used For Protein Calculations  43.1 kg (95 lb)     Est Protein Requirement Amount (gms/kg)  1.5 gm protein 52 - 64 gm     Estimated Protein Requirements (gms/day)  64.64        Fluid Requirements    Estimated Fluid Requirement Method  [place]ass[place]-Segar Formula     K[place]-Segar Method (over 20 kg)  2361.84         Nutrition Prescription Ordered     Row Name 01/12/21 1522          Nutrition Prescription PO    Current PO Diet  Regular         Evaluation of Received Nutrient/Fluid Intake     Row Name 01/12/21 1523          PO Evaluation    Number of Days PO Intake Evaluated  1 day     Number of Meals  1     % PO Intake  25               Problem/Interventions:  Problem 1     Row Name 01/12/21 1523          Nutrition Diagnoses Problem 1    Problem 1  Inadequate Intake/Infusion     Inadequate Intake Type  Oral     Etiology (related to)  Factors Affecting Nutrition     Mental State/Condition  Confusion     Other  Pt refuses to eat     Signs/Symptoms (evidenced by)  PO Intake     Percent (%) intake recorded  25 %     Over number of meals  1 Multiple meal refusals               Intervention Goal     Row Name 01/12/21 1524          Intervention Goal    General  Meet nutritional needs for age/condition;Improved nutrition related lab(s)     PO  Meet estimated needs;Increase intake;PO intake (%)     PO Intake %  50 %     Weight  Maintain weight         Nutrition Intervention     Row Name 01/12/21 1524          Nutrition Intervention    RD/Tech Action  Follow Tx progress;Encourage intake;Recommend/ordered     Recommended/Ordered  Supplement         Nutrition  Prescription     Row Name 01/12/21 1524          Nutrition Prescription PO    PO Prescription  Begin/change supplement;Other (comment) Continue current diet order as medically appropriate and tolerated     Supplement  Boost Plus;Boost Pudding;Magic Cup;Mighty Shake     Supplement Frequency  Daily     New PO Prescription Ordered?  No, recommended        Other Orders    Obtain Weight  Daily     Obtain Weight Ordered?  No, recommended         Education/Evaluation     Row Name 01/12/21 1525          Education    Education  Education not appropriate at this time     Please explain  Patient confusion;Other (comment) Pt to discharge to         Monitor/Evaluation    Monitor  I&O;Per protocol;PO intake;Supplement intake;Pertinent labs;Weight;Skin status           Electronically signed by:  Kimberly Gardner RD  01/12/21 15:26 EST

## 2021-01-12 NOTE — PLAN OF CARE
Goal Outcome Evaluation:  Plan of Care Reviewed With: patient  Progress: no change  Outcome Summary: No acute events during the night. Complaints of pain controlled with PRN medications. See MAR. Vitals Signs Stable. Will continue to monitor.

## 2021-01-12 NOTE — PLAN OF CARE
Goal Outcome Evaluation:  Plan of Care Reviewed With: patient  Progress: no change  Outcome Summary: VSS. Pt complaining of pain, scheduled prn pain medications given. No acute changes noted during this shift. Will continue to monitor.

## 2021-01-12 NOTE — POST-PROCEDURE NOTE
[address],  [address]  [place] [address]  [phone]        PROCEDURE REPORT        DIAGNOSIS:  Left hip end-stage osteoarthritis, symptomatic    PROCEDURE: Left hip injection under flouroscopy      Stefania Kirkpatrick with date of birth 9/6/1924  presents to [facility] Radiology Department today for planned left hip injection therapy.        Patient is an inpatient treated over the past week for an acute right distal femur periprosthetic fracture with a closed reduction and cylinder cast.  She is also having considerable left hip and thigh pain, and has a chronic internal rotation and flexion contracture of the hip.  Imaging radiographs and CT scan of the pelvis, left hip, femur and knee show no acute fracture, severe end-stage left hip osteoarthritis and a prior healed left distal femur fracture just above her left knee replacement and stabilized with an intact retrograde interlocked intramedullary femur nail.  Additional flouro imaging of the left hip, knee and leg during this hip injection therapy also shows not acute fracture or changes.  A padded left knee immobilizer was placed that also helped relieve pain in addition to the hip injection.      The padded long leg right cylinder cast is intact and gentle active assisted mobility of the right hip and leg is well tolerated as the acute right distal femur fracture is stabilized with the protection of the cylinder cast.       Patient is brought down this morning from her inpatient bed to the Radiology Department Flouroscopy Suite on 1/12/2021 for planned elective left hip injection under flouroscopy for symptomatic left hip painful end-stage osteoarthritis.    Procedure:     After consent was obtained, and using ethyl chloride topical local anesthetic, the left hip was then prepped and draped with sterile technique including iodine gauze and alcohol pads. With an anterior hip approach and flouroscopy guidance, and care to stay lateral  of the femoral artery, the hip joint was entered via a 20 gauge spinal needle.  A mixture of 80 mg methylprednisolone in one ml of methylprednisolone plus 9 ml of 1% plain Lidocaine was injected and the needle withdrawn. The procedure was well tolerated and without complication. The patient appeared to have some relief of focal hip joint pain.  The patient did remain alert and stable during and after the procedure. Will watch for fever, skin issues, increased swelling or persistent pain.    Impression: Symptomatic left hip end-stage osteoarthritis.      Recommendations/Plan:      As pain is hopefully better controlled with injection, padded bracing and medications, will advance PT/OT with max assist transfers to chair and wheelchair, wheelchair and assisted mobility, and sitting exercises and therapy with the physical therapists as tolerated.  Given the patient's overall condition, and together with discussions with health proxy daughter and granddaughter and other care providers, consideration is given to planning for hospice and compassionate care support and arrangements.

## 2021-01-12 NOTE — PROGRESS NOTES
Fracture of shaft of right femur (CMS/HCC)    Status post fall    Essential hypertension    Closed fracture of right distal femur (CMS/HCC)    Acute cystitis without hematuria    Anemia due to blood loss    Adult failure to thrive syndrome    Late onset Alzheimer's disease without behavioral disturbance (CMS/HCC)    Chronic pain syndrome          Subjective:  She was seen this morning and seemed to be doing better she is nonverbal but in fact she nodded yes when I ask if she was feeling better.  There have been no reports shortness of breath chest pain or cough abdominal pain.  She only ate only a small amount of breakfast this morning however.  I spoken to the nurse this afternoon she has received injection in the hip and seemed to help a little bit but she is required oral and IV narcotic administration today to achieve pain relief.  Therefore will initiate a trial of fentanyl in addition.      Vital Signs  Temp:  [96.2 °F (35.7 °C)-98.7 °F (37.1 °C)] 96.2 °F (35.7 °C)  Heart Rate:  [66-73] 66  Resp:  [18] 18  BP: (126-162)/(58-87) 138/58    Physical Exam:   General Appearance:   She is nonverbal at this time but nods to questioning   Head:    Normocephalic, without obvious abnormality, atraumatic       Throat:      Neck:     Back:        Lungs:    Lung sound clear without rales rhonchi or wheeze    Heart:   There is a regular rhythm without murmur S3   Chest Wall:       Abdomen:    Abdomen soft there is no apparent tenderness   Extremities:  No edema in left lower extremity in a cast is noted during the right calf and thigh   Pulses:      Skin:      Lymph nodes:      Neurologic:            Results Review:     I reviewed the patient's new clinical results.    Lab Results (last 24 hours)     ** No results found for the last 24 hours. **        Imaging Results (Last 24 Hours)     Procedure Component Value Units Date/Time    FL Guide For Pain Meds Inj [[id number]] Resulted: 01/12/21 1116     Updated: 01/12/21 1126             Abilio Garcia MD  01/12/21  14:02 EST

## 2021-01-13 ENCOUNTER — APPOINTMENT (OUTPATIENT)
Dept: GENERAL RADIOLOGY | Facility: HOSPITAL | Age: 86
End: 2021-01-13

## 2021-01-13 LAB
ALBUMIN SERPL-MCNC: 3.2 G/DL (ref 3.5–5.2)
ANION GAP SERPL CALCULATED.3IONS-SCNC: 16.8 MMOL/L (ref 5–15)
BASOPHILS # BLD AUTO: 0.04 10*3/MM3 (ref 0–0.2)
BASOPHILS NFR BLD AUTO: 0.6 % (ref 0–1.5)
BUN SERPL-MCNC: 6 MG/DL (ref 8–23)
BUN/CREAT SERPL: 15 (ref 7–25)
CALCIUM SPEC-SCNC: 8.7 MG/DL (ref 8.2–9.6)
CHLORIDE SERPL-SCNC: 100 MMOL/L (ref 98–107)
CO2 SERPL-SCNC: 21.2 MMOL/L (ref 22–29)
CREAT SERPL-MCNC: 0.4 MG/DL (ref 0.57–1)
DEPRECATED RDW RBC AUTO: 50.4 FL (ref 37–54)
EOSINOPHIL # BLD AUTO: 0.07 10*3/MM3 (ref 0–0.4)
EOSINOPHIL NFR BLD AUTO: 1.1 % (ref 0.3–6.2)
ERYTHROCYTE [DISTWIDTH] IN BLOOD BY AUTOMATED COUNT: 15 % (ref 12.3–15.4)
GFR SERPL CREATININE-BSD FRML MDRD: 148 ML/MIN/1.73
GLUCOSE SERPL-MCNC: 99 MG/DL (ref 65–99)
HCT VFR BLD AUTO: 27.6 % (ref 34–46.6)
HGB BLD-MCNC: 8.9 G/DL (ref 12–15.9)
IMM GRANULOCYTES # BLD AUTO: 0.26 10*3/MM3 (ref 0–0.05)
IMM GRANULOCYTES NFR BLD AUTO: 4 % (ref 0–0.5)
LYMPHOCYTES # BLD AUTO: 0.58 10*3/MM3 (ref 0.7–3.1)
LYMPHOCYTES NFR BLD AUTO: 9 % (ref 19.6–45.3)
MCH RBC QN AUTO: 30.2 PG (ref 26.6–33)
MCHC RBC AUTO-ENTMCNC: 32.2 G/DL (ref 31.5–35.7)
MCV RBC AUTO: 93.6 FL (ref 79–97)
MONOCYTES # BLD AUTO: 0.39 10*3/MM3 (ref 0.1–0.9)
MONOCYTES NFR BLD AUTO: 6 % (ref 5–12)
NEUTROPHILS NFR BLD AUTO: 5.11 10*3/MM3 (ref 1.7–7)
NEUTROPHILS NFR BLD AUTO: 79.3 % (ref 42.7–76)
NRBC BLD AUTO-RTO: 0 /100 WBC (ref 0–0.2)
PHOSPHATE SERPL-MCNC: 2.6 MG/DL (ref 2.5–4.5)
PLATELET # BLD AUTO: 405 10*3/MM3 (ref 140–450)
PMV BLD AUTO: 10.2 FL (ref 6–12)
POTASSIUM SERPL-SCNC: 3.7 MMOL/L (ref 3.5–5.2)
RBC # BLD AUTO: 2.95 10*6/MM3 (ref 3.77–5.28)
SODIUM SERPL-SCNC: 138 MMOL/L (ref 136–145)
WBC # BLD AUTO: 6.45 10*3/MM3 (ref 3.4–10.8)

## 2021-01-13 PROCEDURE — 25010000002 MORPHINE SULFATE (PF) 2 MG/ML SOLUTION: Performed by: INTERNAL MEDICINE

## 2021-01-13 PROCEDURE — 80069 RENAL FUNCTION PANEL: CPT | Performed by: INTERNAL MEDICINE

## 2021-01-13 PROCEDURE — 99232 SBSQ HOSP IP/OBS MODERATE 35: CPT | Performed by: INTERNAL MEDICINE

## 2021-01-13 PROCEDURE — 73564 X-RAY EXAM KNEE 4 OR MORE: CPT

## 2021-01-13 PROCEDURE — 85025 COMPLETE CBC W/AUTO DIFF WBC: CPT | Performed by: INTERNAL MEDICINE

## 2021-01-13 RX ADMIN — MORPHINE SULFATE 2 MG: 2 INJECTION, SOLUTION INTRAMUSCULAR; INTRAVENOUS at 07:35

## 2021-01-13 RX ADMIN — MORPHINE SULFATE 2 MG: 2 INJECTION, SOLUTION INTRAMUSCULAR; INTRAVENOUS at 03:33

## 2021-01-13 RX ADMIN — AMLODIPINE BESYLATE 5 MG: 5 TABLET ORAL at 09:09

## 2021-01-13 RX ADMIN — MORPHINE SULFATE 2 MG: 2 INJECTION, SOLUTION INTRAMUSCULAR; INTRAVENOUS at 13:55

## 2021-01-13 RX ADMIN — CEFUROXIME AXETIL 250 MG: 250 TABLET ORAL at 09:09

## 2021-01-13 RX ADMIN — SODIUM CHLORIDE, PRESERVATIVE FREE 3 ML: 5 INJECTION INTRAVENOUS at 09:11

## 2021-01-13 RX ADMIN — MORPHINE SULFATE 2 MG: 2 INJECTION, SOLUTION INTRAMUSCULAR; INTRAVENOUS at 09:14

## 2021-01-13 RX ADMIN — CELECOXIB 100 MG: 100 CAPSULE ORAL at 09:09

## 2021-01-13 RX ADMIN — APIXABAN 5 MG: 5 TABLET, FILM COATED ORAL at 09:09

## 2021-01-13 RX ADMIN — SODIUM CHLORIDE, PRESERVATIVE FREE 10 ML: 5 INJECTION INTRAVENOUS at 21:54

## 2021-01-13 RX ADMIN — SODIUM CHLORIDE, PRESERVATIVE FREE 10 ML: 5 INJECTION INTRAVENOUS at 09:09

## 2021-01-13 RX ADMIN — SODIUM CHLORIDE, PRESERVATIVE FREE 3 ML: 5 INJECTION INTRAVENOUS at 21:53

## 2021-01-13 RX ADMIN — MORPHINE SULFATE 2 MG: 2 INJECTION, SOLUTION INTRAMUSCULAR; INTRAVENOUS at 11:24

## 2021-01-13 NOTE — PROGRESS NOTES
Adult Nutrition  Assessment/PES    Patient Name:  Stefania Kirkpatrick  YOB: 1924  MRN: [id number]  Admit Date:  1/4/2021    Assessment Date:  1/13/2021    Comments:    Recommend:  1. Continue current diet order as medically appropriate and tolerated.  2. Establish and encourage PO intake.   3. Continue Boost Plus daily, Boost Pudding daily, a Magic Cup daily, and a Mighty Shake daily.  4. Consider a multivitamin with minerals daily.  5. If PO remains poor, consider an appetite stimulant.      RD to follow pt and available PRN.    Reason for Assessment     Row Name 01/13/21 1132          Reason for Assessment    Reason For Assessment  follow-up protocol     Diagnosis  trauma;cardiac disease;neurologic conditions Broken leg, HTN< fracture of R distal femur, Bladder infection, Blood loss anemia, Adult FTT, Alzheimer's disease     Identified At Risk by Screening Criteria  other (see comments);reduced oral intake over the last month Pt admitted for 7+ days             Labs/Tests/Procedures/Meds     Row Name 01/13/21 1133          Labs/Procedures/Meds    Lab Results Reviewed  reviewed, pertinent     Lab Results Comments  Low: BUN, Alb, Cr        Medications    Pertinent Medications Reviewed  reviewed         Physical Findings     Row Name 01/13/21 1133          Physical Findings    Skin  other (see comments) R anterior leg           Nutrition Prescription Ordered     Row Name 01/13/21 1133          Nutrition Prescription PO    Current PO Diet  Regular     Supplement  Boost Plus (Ensure Enlive, Ensure Plus);Boost Pudding (Ensure Pudding);Mighty Shake;Magic Cup     Supplement Frequency  Daily         Evaluation of Received Nutrient/Fluid Intake     Row Name 01/13/21 1133          PO Evaluation    Number of Days PO Intake Evaluated  Insufficient Data               Problem/Interventions:  Problem 1     Row Name 01/13/21 1130          Nutrition Diagnoses Problem 1    Problem 1  Inadequate Intake/Infusion     Inadequate  Intake Type  Oral     Etiology (related to)  Factors Affecting Nutrition     Mental State/Condition  Confusion     Other  Pt refuses to eat     Signs/Symptoms (evidenced by)  PO Intake     Percent (%) intake recorded  25 %     Over number of meals  1 Multiple meal refusals               Intervention Goal     Row Name 01/13/21 1134          Intervention Goal    General  Improved nutrition related lab(s);Meet nutritional needs for age/condition     PO  Meet estimated needs;Establish PO     Weight  Maintain weight         Nutrition Intervention     Row Name 01/13/21 1135          Nutrition Intervention    RD/Tech Action  Follow Tx progress;Encourage intake         Nutrition Prescription     Row Name 01/13/21 1135          Nutrition Prescription PO    PO Prescription  Other (comment) Continue diet and supplements as medically tolerated     New PO Prescription Ordered?  No, recommended        Other Orders    Obtain Weight  Daily     Obtain Weight Ordered?  No, recommended     Supplement  Vitamin mineral supplement     Supplement Ordered?  No, recommended         Education/Evaluation     Row Name 01/13/21 1135          Education    Education  Education not appropriate at this time     Please explain  Patient confusion;Other (comment) Comfort Measures        Monitor/Evaluation    Monitor  Per protocol;I&O;PO intake;Supplement intake;Pertinent labs;Weight;Skin status           Electronically signed by:  Breann Lee RD  01/13/21 11:35 EST

## 2021-01-13 NOTE — PLAN OF CARE
Goal Outcome Evaluation:  Plan of Care Reviewed With: patient  Frequent medication this morning for pain.  More comfortable in afternoon.  Repositioned frequently.

## 2021-01-13 NOTE — PROGRESS NOTES
PC/Nurse consult received this am for Goals of Care Discussion.  Pts currently a LTC resident of [facility].  She is Code Status of: NO CPR, Comfort Measures.  Pt does have a Living Will on file naming her Granddaughter, Shannon Forte, as HC surrogate and her daughter, Alis Kirkpatrick, as HC surrogate.  Spoke with Dr Chris Mesa who recommended Hospice care at facility or Inpt Hospice.    Pt admitted S/P fall with resulting distal right femur Fx.  Hypoxia in ED.   Admission Dx:  Fx right distal femur s/p fall, Dementia/Alzheimer's, SOA.  Hx:  Left femoral fx, HTN, OA, Dementia, Hypertensive heart disease without CHF.      Visited pt this am.  Pt c/o pain in leg and restless.  Documentation revealed pt received Morphine 2mg IV x 8 doses in last 24hr, one dose NORCO 5/325mg in last 24 hr and Fentanyl 25mcg patch applied 1/12/2021 @ 1446.  Pt requested I not leave at that time.  I stayed with pt until she started to doze off.  I notified pt's primary nurse, Shabana RUTHERFORD, of pt's c/o pain if it was time for medication.    I contacted pt's daughter, Alis Kirkpatrick, to discuss pt's goals of care.  Ms Kirkpatrick confirmed wanting her mother comfortable.  I explained I was with the Palliative Care team and part of our job is to make sure pt's are comfortable and that we are following their wishes.   She confirmed pt would not want to be resuscitated.   I explained to her what Palliative Care is in general and then the two programs they provide:  Palliative Care (Supportive care for chronic illnesses) and Hospice Care.  I explained the services they provide and the criteria for Hospice care (if a disease progressed the way it typically does would you be surprised if the pt passed in 6 months).   I explained it didn't mean the pt would definitely pass in 6 months.  She verbalized understanding.  Ms Kirkpatrick verbalized wanting Comfort to be the goal.  I explained if Comfort is the goal then Hospice would be appropriate.  I explained  "Hospice goes where the pt is except for short term rehab.  I explained if pt's pain was controlled she could possibly go back to [facility] however at this time her she is requiring IV Morphine and the LTC would not be able to provide that.  I did explain a pain patch was applied yesterday with hope that within 24-36hr it will start taking effect however if pt still requiring IV Morphine the Inpt Hospice would be appropriate for symptom management and if stabilizes pt would return to [facility].  I recommended daughter \"hold\" pt's bed at [facility].  She reported she had.  Pt's daughter verbalized understanding and agreement with plan.  She was agreeable to either destination provided pt would be comfortable. I informed her that if pt DC to the [facility] we would need her to go to the [facility] to sign papers when pt Discharged.  Pt's primary nurse, Shabana RUTHERFORD, updated.  Dr Chris Mesa was updated with discussion of planning DC tomorrow to allow time to evaluate effectiveness of Fentanyl.    Roz S/US contacted [facility] to obtain copy of pt's EMS/DNR .  A copy was faxed to [facility] and copy placed in chart.    Pt had COVID 19 test 1/12/2021 with NEG results.            "

## 2021-01-13 NOTE — PROGRESS NOTES
Continued Stay Note  RYLIE Perez     Patient Name: Stefania Kirkpatrick  MRN: [id number]  Today's Date: 1/13/2021    Admit Date: 1/4/2021    Discharge Plan     Row Name 01/13/21 1230       Plan    Plan  Hopsice  has  been consulted        Discharge Codes    No documentation.       Expected Discharge Date and Time     Expected Discharge Date Expected Discharge Time    Rui 15, 2021             Alis Burns RN

## 2021-01-13 NOTE — PROGRESS NOTES
Fracture of shaft of right femur (CMS/HCC)    Status post fall    Essential hypertension    Closed fracture of right distal femur (CMS/HCC)    Acute cystitis without hematuria    Anemia due to blood loss    Adult failure to thrive syndrome she continues to have an adequate oral intake continues to have uncontrolled pain x-rays results are reviewed proceed with x-ray of the left knee in addition nevertheless at this time I would recommend proceeding with hospice care and end-of-life management    Late onset Alzheimer's disease without behavioral disturbance (CMS/HCC)    Chronic pain syndrome          Subjective:    It is apparent that the injection provided by Dr. Medina he offered no response in regards to her pain burden nurse reports that there is considerable pain requiring IV morphine in spite of the addition of a fentanyl patch.  Patient is nonverbal but seems to point to her left hip as a source of her pain.  Her oral intake is close to nothing.  Cough or apparent chest pain reports.      Vital Signs  Temp:  [96.5 °F (35.8 °C)-98.6 °F (37 °C)] 97.8 °F (36.6 °C)  Heart Rate:  [60-76] 72  Resp:  [17-20] 18  BP: (108-139)/(47-97) 108/52    Physical Exam:   General Appearance:   She is obviously uncomfortable she not to hold my hand attempting to speak but her speech is unintelligible   Head:    Normocephalic, without obvious abnormality, atraumatic       Throat:      Neck:     Back:        Lungs:    The lung sound clear no rales rhonchi weeks    Heart:   There is a regular rhythm without murmur S3   Chest Wall:       Abdomen:    Abdomen soft there is no apparent mass   Extremities:  There is a cast on the right lower extremity I had the nurse lift her left lower extremity and with minimal hip flexion there was a prompt pain reaction   Pulses:      Skin:      Lymph nodes:      Neurologic:            Results Review:     I reviewed the patient's new clinical results.    Lab Results (last 24 hours)     Procedure  Component Value Units Date/Time    Renal Function Panel [[id number]]  (Abnormal) Collected: 01/13/21 0621    Specimen: Blood Updated: 01/13/21 0729     Glucose 99 mg/dL      BUN 6 mg/dL      Creatinine 0.40 mg/dL      Sodium 138 mmol/L      Potassium 3.7 mmol/L      Chloride 100 mmol/L      CO2 21.2 mmol/L      Calcium 8.7 mg/dL      Albumin 3.20 g/dL      Phosphorus 2.6 mg/dL      Anion Gap 16.8 mmol/L      BUN/Creatinine Ratio 15.0     eGFR Non African Amer 148 mL/min/1.73     Narrative:      GFR Normal >60  Chronic Kidney Disease <60  Kidney Failure <15      CBC & Differential [[id number]]  (Abnormal) Collected: 01/13/21 0621    Specimen: Blood Updated: 01/13/21 0719    Narrative:      The following orders were created for panel order CBC & Differential.  Procedure                               Abnormality         Status                     ---------                               -----------         ------                     CBC Auto Differential[[id number]]        Abnormal            Final result                 Please view results for these tests on the individual orders.    CBC Auto Differential [[id number]]  (Abnormal) Collected: 01/13/21 0621    Specimen: Blood Updated: 01/13/21 0719     WBC 6.45 10*3/mm3      RBC 2.95 10*6/mm3      Hemoglobin 8.9 g/dL      Hematocrit 27.6 %      MCV 93.6 fL      MCH 30.2 pg      MCHC 32.2 g/dL      RDW 15.0 %      RDW-SD 50.4 fl      MPV 10.2 fL      Platelets 405 10*3/mm3      Neutrophil % 79.3 %      Lymphocyte % 9.0 %      Monocyte % 6.0 %      Eosinophil % 1.1 %      Basophil % 0.6 %      Immature Grans % 4.0 %      Neutrophils, Absolute 5.11 10*3/mm3      Lymphocytes, Absolute 0.58 10*3/mm3      Monocytes, Absolute 0.39 10*3/mm3      Eosinophils, Absolute 0.07 10*3/mm3      Basophils, Absolute 0.04 10*3/mm3      Immature Grans, Absolute 0.26 10*3/mm3      nRBC 0.0 /100 WBC     COVID PRE-OP / PRE-PROCEDURE SCREENING ORDER (NO ISOLATION) - Swab, Nasal Cavity [[id number]]   (Normal) Collected: 01/12/21 1447    Specimen: Swab from Nasal Cavity Updated: 01/12/21 1546    Narrative:      The following orders were created for panel order COVID PRE-OP / PRE-PROCEDURE SCREENING ORDER (NO ISOLATION) - Swab, Nasal Cavity.  Procedure                               Abnormality         Status                     ---------                               -----------         ------                     COVID-19,[facility] IN-[facility]MAN[facility]...[[id number]]  Normal              Final result                 Please view results for these tests on the individual orders.    COVID-19,[facility] IN-HOUSE,Nasal Swab No Transport Media 3-4 HR TAT - Swab, Nasal Cavity [[id number]]  (Normal) Collected: 01/12/21 1447    Specimen: Swab from Nasal Cavity Updated: 01/12/21 1546     COVID19 Not Detected    Narrative:      Fact sheet for providers: https://www.fda.gov/media/070792/download     Fact sheet for patients: https://www.fda.gov/media/290578/download    Test performed by PCR.        Imaging Results (Last 24 Hours)     ** No results found for the last 24 hours. **            Abilio Garcia MD  01/13/21  13:40 EST

## 2021-01-13 NOTE — PLAN OF CARE
Goal Outcome Evaluation:  Plan of Care Reviewed With: patient  Progress: no change  Outcome Summary: No acute events during the night. Vital Signs Stable. Patient complaints of pain, See MAR for medication Administration. Will continue to monitor.

## 2021-01-14 VITALS
RESPIRATION RATE: 17 BRPM | TEMPERATURE: 98 F | BODY MASS INDEX: 28.17 KG/M2 | SYSTOLIC BLOOD PRESSURE: 134 MMHG | OXYGEN SATURATION: 97 % | WEIGHT: 125.22 LBS | HEIGHT: 56 IN | HEART RATE: 74 BPM | DIASTOLIC BLOOD PRESSURE: 75 MMHG

## 2021-01-14 PROBLEM — S72.401D CLOSED FRACTURE OF DISTAL END OF RIGHT FEMUR WITH ROUTINE HEALING: Status: ACTIVE | Noted: 2021-01-04

## 2021-01-14 PROCEDURE — 99239 HOSP IP/OBS DSCHRG MGMT >30: CPT | Performed by: INTERNAL MEDICINE

## 2021-01-14 RX ORDER — CELECOXIB 100 MG/1
100 CAPSULE ORAL DAILY
Qty: 30 CAPSULE | Refills: 11 | Status: SHIPPED | OUTPATIENT
Start: 2021-01-15

## 2021-01-14 RX ORDER — TRAMADOL HYDROCHLORIDE 50 MG/1
50 TABLET ORAL EVERY 6 HOURS PRN
Qty: 60 TABLET | Refills: 0 | Status: SHIPPED | OUTPATIENT
Start: 2021-01-14 | End: 2021-01-18

## 2021-01-14 RX ORDER — HYDROCODONE BITARTRATE AND ACETAMINOPHEN 5; 325 MG/1; MG/1
1 TABLET ORAL EVERY 6 HOURS PRN
Qty: 120 TABLET | Refills: 0 | Status: SHIPPED | OUTPATIENT
Start: 2021-01-14 | End: 2021-02-22

## 2021-01-14 RX ORDER — FENTANYL 25 UG/H
1 PATCH TRANSDERMAL
Qty: 2 PATCH | Refills: 0 | Status: SHIPPED | OUTPATIENT
Start: 2021-01-15 | End: 2021-01-19

## 2021-01-14 RX ADMIN — APIXABAN 5 MG: 5 TABLET, FILM COATED ORAL at 08:39

## 2021-01-14 RX ADMIN — SODIUM CHLORIDE, PRESERVATIVE FREE 10 ML: 5 INJECTION INTRAVENOUS at 08:44

## 2021-01-14 RX ADMIN — AMLODIPINE BESYLATE 5 MG: 5 TABLET ORAL at 08:40

## 2021-01-14 RX ADMIN — SODIUM CHLORIDE, PRESERVATIVE FREE 3 ML: 5 INJECTION INTRAVENOUS at 08:44

## 2021-01-14 RX ADMIN — CELECOXIB 100 MG: 100 CAPSULE ORAL at 08:39

## 2021-01-14 NOTE — PROGRESS NOTES
Spoke to Dr. Garcia today re [facility].  He reports that patient has now received good pain control with the use of her fentanyl patch and plans to discharge her back to [facility].  He states that he plans for hospice to continue care at the facility.    Called [facility]; spoke to Cyndi.  Update given that pt was stable and ready for discharge.  She reports okay to accept today.  She will have their SW to call hospice and make referral.  She has pulled current note from [employer].  Report number is [phone].  KRISH Braun and Alis MELLO RN CM updated.    Called HCP; spoke to Jeana.  Update given that pt was discharging today to [facility] and that their SW would be calling to make referral as well.  She reports that referral had been received yesterday, but did not know discharge location.  She will pull needed info from Epic.

## 2021-01-14 NOTE — PROGRESS NOTES
Date of Discharge:  1/14/2021  Closed fracture left distal femur, intractable pain, adult failure to thrive  Discharge Diagnosis:   Patient Active Problem List   Diagnosis   • Closed fracture of left distal femur (CMS/HCC)   • Status post fall   • Osteoarthritis   • Essential hypertension   • Hypokalemia   • Fracture of shaft of right femur (CMS/HCC)   • Closed fracture of right distal femur (CMS/HCC)   • Acute cystitis without hematuria   • Anemia due to blood loss   • Adult failure to thrive syndrome   • Late onset Alzheimer's disease without behavioral disturbance (CMS/HCC)   • Chronic pain syndrome         Hospital Course  Patient is a 89 y.o. female presented with fall which she had sustained when falling out of bed at the [facility] where she resides had been taken to the emergency department an x-ray of the pelvis was negative for fracture but on return she is most noted to have intractable and severe pain she was sent back where x-ray of the femur revealed an acute comminuted displaced unstable right distal femur fracture below her hip prosthesis.  She was admitted and seen by orthopedic surgery Dr. Lyons.  A cylindrical cast was applied during the calf and thigh.  After however the patient had evidence of severe pain evidence of adult failure to thrive with little p.o. intake.  She was reevaluated by orthopedic surgery and the right hip was injected but that did not seem to afford any additional pain relief she was placed on additional narcotic analgesia including topical fentanyl and by the day of discharge she appeared to be sedated but comfortable.  She is seen by the palliative hospice team and plan is to proceed with concentration on quality of life and pain relief measures since she appears to have satisfactory pain relief at this time we will proceed with return to long-term care facility at [facility].  At discharge her temperature is 98.2 pulse 63 respiration 17 blood pressure  116/56 O2 saturation 99% on 2 L per nasal cannula O2 saturations were found to be less than 90% on multiple occasions on room air.  She was noted to have postoperative anemia, which was addressed.    Pertinent Test Results:  On January 13 the glucose is 99 BUN 6 creatinine 0.4 sodium 138 potassium 3.7 white blood cell count 6.45 hemoglobin 8.9  COVID-19 not detected  Urine culture grew E. coli and Streptococcus she was treated with cefuroxime  Physical Exam:   Patient is largely nonverbal the head appeared nontraumatic the lungs were clear cardiac regular without murmur 3 abdomen soft nontender bowel sounds normal there was pain reaction on manipulation of either lower extremity really marked pain on left hip flexion prompting repeat x-ray which was negative for acute fracture    Discharge Disposition      Discharge Medications     Discharge Medications      ASK your doctor about these medications      Instructions Start Date   acetaminophen 500 MG tablet  Commonly known as: TYLENOL   500 mg, Oral, Every 6 Hours PRN      amLODIPine 5 MG tablet  Commonly known as: NORVASC   5 mg, Oral, Daily      apixaban 5 MG tablet tablet  Commonly known as: ELIQUIS   5 mg, Oral, 2 Times Daily      bisacodyl 10 MG suppository  Commonly known as: DULCOLAX   10 mg, Rectal, Daily PRN      docusate sodium 100 MG capsule   Take 1 capsule by mouth 2 (Two) Times a Day As Needed for Constipation.      HYDROcodone-acetaminophen  MG per tablet  Commonly known as: NORCO   1 tablet, Oral, Every 6 Hours PRN      HYDROcodone-acetaminophen 5-325 MG per tablet  Commonly known as: NORCO   1 tablet, Oral, Every 6 Hours      Lidocaine 4 % patch   4 %, Apply externally, Apply to left knee daily PRN       magnesium hydroxide 2400 MG/10ML suspension suspension  Commonly known as: MILK OF MAGNESIA  Ask about: Which instructions should I use?   30 mL, Oral, Daily PRN      melatonin 3 MG tablet   3 mg, Oral, Nightly      sertraline 50 MG  tablet  Commonly known as: ZOLOFT   50 mg, Oral, Daily      traMADol 50 MG tablet  Commonly known as: ULTRAM   50 mg, Oral, 4 Times Daily      travoprost (KIRAN free) 0.004 % solution ophthalmic solution  Commonly known as: TRAVATAN   1 drop, Every Evening, in both eyes at 2200 (per MAR)      Vitamin D 50 MCG (2000 UT) tablet   2,000 Units, Oral, Daily         I recommend continuing hospice care at the nursing facility fall prevention decubitus prevention and aspiration prevention measures should be employed she should move and remain a DO NOT RESUSCITATE status; I do not anticipate additional lab monitoring    Discharge Diet:   She may have a regular diet with preferences will need assistance of eating  Activity at Discharge:   To be per orthopedic surgery  Follow-up Appointments  No future appointments.  [unfilled]    Test Results Pending at Discharge  [unfilled]     Abilio Garcia MD  01/14/21  09:42 EST

## 2021-01-14 NOTE — PLAN OF CARE
Goal Outcome Evaluation:  Plan of Care Reviewed With: patient  Progress: no change  Outcome Summary: Patient rested throughout the night, she did not c/o any pain, she seemed to be resting well throughout the night. VSS, on room air. Pt has a bed acceptance at [facility]. Awaiting completed paperwork from her son.

## 2021-01-14 NOTE — PROGRESS NOTES
Case Management Discharge Note      Final Note: Returning to [facility] under hospice    Provided Post Acute Provider List?: N/A  Provided Post Acute Provider Quality & Resource List?: N/A    Selected Continued Care - Admitted Since 1/4/2021     Destination Coordination complete    Service Provider Selected Services Address Phone Fax Patient Preferred    [facility]  Skilled Nursing [address], [facility] [id number] [phone] --          Durable Medical Equipment    No services have been selected for the patient.              Dialysis/Infusion    No services have been selected for the patient.              Home Medical Care    No services have been selected for the patient.              Therapy    No services have been selected for the patient.              Community Resources    No services have been selected for the patient.                  Transportation Services  Ambulance: [facility]    Final Discharge Disposition Code: 64 - nursing facility under Medicaid

## 2021-01-15 NOTE — DISCHARGE SUMMARY
Date of Discharge:  1/15/2021    Discharge Diagnosis:   Closed fracture lf right distal femur; adult failure to thrive; intractable pain  Patient Active Problem List   Diagnosis   • Closed fracture of left distal femur (CMS/HCC)   • Status post fall   • Osteoarthritis   • Essential hypertension   • Hypokalemia   • Fracture of shaft of right femur (CMS/[facility])   • Closed fracture of distal end of right femur with routine healing   • Acute cystitis without hematuria   • Anemia due to blood loss   • Adult failure to thrive syndrome   • Late onset Alzheimer's disease without behavioral disturbance (CMS/[facility])   • Chronic pain syndrome         Hospital Course  Patient is a 89 y.o. female presented with a fall which she sustained at the [facility] where she resides when she had somehow fallen out of bed.  Was initially brought to emergency department for x-rays of pelvis was negative for fracture and she is discharged back to nursing facility on to be found to have severe pain she is taken back to emergency department for x-ray of at this time revealed comminuted displaced fracture of the right distal femur.  Was hospitalized and seen by orthopedic surgery Dr. Easton.  Cylindrical cast was placed.  This procedure however she had adult failure to thrive with poor p.o. intake D declined level of consciousness and severe pain.  The right hip was injected by orthopedic surgery but she continued to have pain had marked pain response on manipulation of the left lower extremity additional x-rays were obtained showing no additional evidence of fracture.  Because of her decline hospice palliative care was consulted.  Placed on additional narcotic analgesic therapy which seemed to help control her pain and therefore she is discharged back to [facility] for long-term care and continued hospice care with comfort measures and end-of-life management.    Pertinent Test Results:  On 9 January 13 the glucose is 99 BUN 6  creatinine 0.4 sodium 138 potassium 3.7 white blood cell count 6.45 hemoglobin 8.9  COVID-19 not detected  Urine culture grew E. coli and Streptococcus she was treated with cephalosporin antibiotics  Troponin was less than 0.01 proBNP 579  D-dimer 3.22  X-ray showed distal right femur fracture  CT of the left lower extremity showed a healed remote left distal femur fracture status post ORIF no acute fracture  CT of the chest showed no evidence of pulmonary embolus or dissection  Physical Exam:   Patient at times appear to be in pain with grimacing head appeared nontraumatic chest is clear to auscultation percussion normal to suspension cardiac regular rhythm without murmur S3 abdomen soft nontender bowel sounds normal a cylindrical cast was placed on the right lower extremity there is no left lower extremity edema    Discharge Disposition  Long Term Care (DC - External)    Discharge Medications     Discharge Medications      New Medications      Instructions Start Date   celecoxib 100 MG capsule  Commonly known as: CeleBREX   100 mg, Oral, Daily      fentaNYL 25 MCG/HR patch  Commonly known as: DURAGESIC   1 patch, Transdermal, Every 72 Hours         Changes to Medications      Instructions Start Date   HYDROcodone-acetaminophen 5-325 MG per tablet  Commonly known as: NORCO  What changed:   · when to take this  · reasons to take this  · Another medication with the same name was removed. Continue taking this medication, and follow the directions you see here.   1 tablet, Oral, Every 6 Hours PRN      traMADol 50 MG tablet  Commonly known as: ULTRAM  What changed:   · when to take this  · reasons to take this   50 mg, Oral, Every 6 Hours PRN         Continue These Medications      Instructions Start Date   acetaminophen 500 MG tablet  Commonly known as: TYLENOL   500 mg, Oral, Every 6 Hours PRN      amLODIPine 5 MG tablet  Commonly known as: NORVASC   5 mg, Oral, Daily      apixaban 5 MG tablet tablet  Commonly known  as: ELIQUIS   5 mg, Oral, 2 Times Daily      bisacodyl 10 MG suppository  Commonly known as: DULCOLAX   10 mg, Rectal, Daily PRN      docusate sodium 100 MG capsule   Take 1 capsule by mouth 2 (Two) Times a Day As Needed for Constipation.      Lidocaine 4 % patch   4 %, Apply externally, Apply to left knee daily PRN       magnesium hydroxide 2400 MG/10ML suspension suspension  Commonly known as: MILK OF MAGNESIA   30 mL, Oral, Daily PRN      melatonin 3 MG tablet   3 mg, Oral, Nightly      sertraline 50 MG tablet  Commonly known as: ZOLOFT   50 mg, Oral, Daily      travoprost (BAK free) 0.004 % solution ophthalmic solution  Commonly known as: TRAVATAN   1 drop, Every Evening, in both eyes at 2200 (per MAR)         Stop These Medications    Vitamin D 50 MCG (2000 UT) tablet        Contact physician if pain control appears to be inadequate.  Fall prevention decubitus prevention aspiration prevention measures should be employed.  Continue hospice care  Use nasal cannula oxygen to maintain O2 saturation greater than 90% if needed for comfort measures  Discharge Diet:   she may have a regular diet  Activity at Discharge:     Follow-up Appointments  Follow-up should be at the facility with hospice and Dr. Garcia  Follow-up with Dr. Lyons as instructed by him    Test Results Pending at Discharge  Pending Labs     Order Current Status    Green Top (No Gel) In process    [place] Draw In process           Abilio Garcia MD  01/15/21  10:06 EST

## 2021-02-02 ENCOUNTER — APPOINTMENT (OUTPATIENT)
Dept: GENERAL RADIOLOGY | Facility: HOSPITAL | Age: 86
End: 2021-02-02

## 2021-02-02 ENCOUNTER — TELEPHONE (OUTPATIENT)
Dept: ORTHOPEDIC SURGERY | Facility: CLINIC | Age: 86
End: 2021-02-02

## 2021-02-02 ENCOUNTER — HOSPITAL ENCOUNTER (EMERGENCY)
Facility: HOSPITAL | Age: 86
Discharge: NURSING, REHAB OR LONG-TERM CARE | End: 2021-02-03
Attending: EMERGENCY MEDICINE | Admitting: EMERGENCY MEDICINE

## 2021-02-02 DIAGNOSIS — N39.0 URINARY TRACT INFECTION WITHOUT HEMATURIA, SITE UNSPECIFIED: Primary | ICD-10-CM

## 2021-02-02 DIAGNOSIS — Z86.59 HISTORY OF DEMENTIA: ICD-10-CM

## 2021-02-02 DIAGNOSIS — Z20.822 COVID-19 RULED OUT BY LABORATORY TESTING: ICD-10-CM

## 2021-02-02 PROCEDURE — 96365 THER/PROPH/DIAG IV INF INIT: CPT

## 2021-02-02 PROCEDURE — 99284 EMERGENCY DEPT VISIT MOD MDM: CPT

## 2021-02-02 PROCEDURE — 71045 X-RAY EXAM CHEST 1 VIEW: CPT

## 2021-02-02 PROCEDURE — 96361 HYDRATE IV INFUSION ADD-ON: CPT

## 2021-02-02 RX ORDER — SODIUM CHLORIDE 0.9 % (FLUSH) 0.9 %
10 SYRINGE (ML) INJECTION AS NEEDED
Status: DISCONTINUED | OUTPATIENT
Start: 2021-02-02 | End: 2021-02-03 | Stop reason: HOSPADM

## 2021-02-02 NOTE — TELEPHONE ENCOUNTER
HUB STAFF ATTEMPTED WARM TRANSFER & WAS UNSUCCESSFUL     Caller: KAE - NURSE @ [facility]    Relationship to patient: NURSE AT REHAB FACILITY     Best call back number: [phone]  [phone]     Chief complaint: NEEDS TO RESCHEDULE 2 WEEK POST OP     Type of visit: CLOSED REDUCTION AND CYLINDER CAST DISTAL FEMUR RIGHT 1/6 2 WK F/U     Requested date: ASAP - [facility] NEEDS 24 HRS NOTICE FOR TRANSPORTATION     If rescheduling, when is the original appointment: 02/01/21 & 02/02/21 APPTS WERE CANCELLED DUE TO TRANSPORTATION      NURSE KAE CALL BACK [phone]     THANKS

## 2021-02-03 VITALS
DIASTOLIC BLOOD PRESSURE: 58 MMHG | OXYGEN SATURATION: 93 % | WEIGHT: 125 LBS | BODY MASS INDEX: 28.02 KG/M2 | TEMPERATURE: 97.9 F | SYSTOLIC BLOOD PRESSURE: 141 MMHG | HEART RATE: 69 BPM | RESPIRATION RATE: 18 BRPM

## 2021-02-03 LAB
ALBUMIN SERPL-MCNC: 3.2 G/DL (ref 3.5–5.2)
ALBUMIN/GLOB SERPL: 1.2 G/DL
ALP SERPL-CCNC: 165 U/L (ref 39–117)
ALT SERPL W P-5'-P-CCNC: 6 U/L (ref 1–33)
ANION GAP SERPL CALCULATED.3IONS-SCNC: 9.9 MMOL/L (ref 5–15)
AST SERPL-CCNC: 13 U/L (ref 1–32)
BACTERIA UR QL AUTO: ABNORMAL /HPF
BASOPHILS # BLD AUTO: 0.04 10*3/MM3 (ref 0–0.2)
BASOPHILS NFR BLD AUTO: 0.5 % (ref 0–1.5)
BILIRUB SERPL-MCNC: 0.6 MG/DL (ref 0–1.2)
BILIRUB UR QL STRIP: NEGATIVE
BUN SERPL-MCNC: 14 MG/DL (ref 8–23)
BUN/CREAT SERPL: 32.6 (ref 7–25)
CALCIUM SPEC-SCNC: 8.5 MG/DL (ref 8.2–9.6)
CHLORIDE SERPL-SCNC: 101 MMOL/L (ref 98–107)
CLARITY UR: ABNORMAL
CO2 SERPL-SCNC: 25.1 MMOL/L (ref 22–29)
COLOR UR: YELLOW
CREAT SERPL-MCNC: 0.43 MG/DL (ref 0.57–1)
D-LACTATE SERPL-SCNC: 0.9 MMOL/L (ref 0.5–2)
DEPRECATED RDW RBC AUTO: 54.6 FL (ref 37–54)
EOSINOPHIL # BLD AUTO: 0.06 10*3/MM3 (ref 0–0.4)
EOSINOPHIL NFR BLD AUTO: 0.8 % (ref 0.3–6.2)
ERYTHROCYTE [DISTWIDTH] IN BLOOD BY AUTOMATED COUNT: 15.9 % (ref 12.3–15.4)
GFR SERPL CREATININE-BSD FRML MDRD: 136 ML/MIN/1.73
GLOBULIN UR ELPH-MCNC: 2.6 GM/DL
GLUCOSE SERPL-MCNC: 143 MG/DL (ref 65–99)
GLUCOSE UR STRIP-MCNC: NEGATIVE MG/DL
HCT VFR BLD AUTO: 33.4 % (ref 34–46.6)
HGB BLD-MCNC: 10.5 G/DL (ref 12–15.9)
HGB UR QL STRIP.AUTO: ABNORMAL
HOLD SPECIMEN: NORMAL
HOLD SPECIMEN: NORMAL
HYALINE CASTS UR QL AUTO: ABNORMAL /LPF
IMM GRANULOCYTES # BLD AUTO: 0.03 10*3/MM3 (ref 0–0.05)
IMM GRANULOCYTES NFR BLD AUTO: 0.4 % (ref 0–0.5)
KETONES UR QL STRIP: NEGATIVE
LEUKOCYTE ESTERASE UR QL STRIP.AUTO: ABNORMAL
LYMPHOCYTES # BLD AUTO: 0.45 10*3/MM3 (ref 0.7–3.1)
LYMPHOCYTES NFR BLD AUTO: 5.9 % (ref 19.6–45.3)
MCH RBC QN AUTO: 29.9 PG (ref 26.6–33)
MCHC RBC AUTO-ENTMCNC: 31.4 G/DL (ref 31.5–35.7)
MCV RBC AUTO: 95.2 FL (ref 79–97)
MONOCYTES # BLD AUTO: 0.6 10*3/MM3 (ref 0.1–0.9)
MONOCYTES NFR BLD AUTO: 7.9 % (ref 5–12)
NEUTROPHILS NFR BLD AUTO: 6.45 10*3/MM3 (ref 1.7–7)
NEUTROPHILS NFR BLD AUTO: 84.5 % (ref 42.7–76)
NITRITE UR QL STRIP: NEGATIVE
NRBC BLD AUTO-RTO: 0 /100 WBC (ref 0–0.2)
PH UR STRIP.AUTO: 6 [PH] (ref 5–8)
PLATELET # BLD AUTO: 262 10*3/MM3 (ref 140–450)
PMV BLD AUTO: 10.1 FL (ref 6–12)
POTASSIUM SERPL-SCNC: 4.2 MMOL/L (ref 3.5–5.2)
PROT SERPL-MCNC: 5.8 G/DL (ref 6–8.5)
PROT UR QL STRIP: ABNORMAL
RBC # BLD AUTO: 3.51 10*6/MM3 (ref 3.77–5.28)
RBC # UR: ABNORMAL /HPF
REF LAB TEST METHOD: ABNORMAL
SARS-COV-2 RNA PNL SPEC NAA+PROBE: NOT DETECTED
SODIUM SERPL-SCNC: 136 MMOL/L (ref 136–145)
SP GR UR STRIP: 1.01 (ref 1–1.03)
SQUAMOUS #/AREA URNS HPF: ABNORMAL /HPF
UROBILINOGEN UR QL STRIP: ABNORMAL
WBC # BLD AUTO: 7.63 10*3/MM3 (ref 3.4–10.8)
WBC CLUMPS # UR AUTO: ABNORMAL /HPF
WBC UR QL AUTO: ABNORMAL /HPF
WHOLE BLOOD HOLD SPECIMEN: NORMAL
WHOLE BLOOD HOLD SPECIMEN: NORMAL

## 2021-02-03 PROCEDURE — 85025 COMPLETE CBC W/AUTO DIFF WBC: CPT | Performed by: EMERGENCY MEDICINE

## 2021-02-03 PROCEDURE — 87077 CULTURE AEROBIC IDENTIFY: CPT | Performed by: EMERGENCY MEDICINE

## 2021-02-03 PROCEDURE — 25010000002 CEFTRIAXONE SODIUM-DEXTROSE 1-3.74 GM-%(50ML) RECONSTITUTED SOLUTION: Performed by: EMERGENCY MEDICINE

## 2021-02-03 PROCEDURE — 96365 THER/PROPH/DIAG IV INF INIT: CPT

## 2021-02-03 PROCEDURE — 87186 SC STD MICRODIL/AGAR DIL: CPT | Performed by: EMERGENCY MEDICINE

## 2021-02-03 PROCEDURE — 87635 SARS-COV-2 COVID-19 AMP PRB: CPT | Performed by: EMERGENCY MEDICINE

## 2021-02-03 PROCEDURE — 83605 ASSAY OF LACTIC ACID: CPT | Performed by: EMERGENCY MEDICINE

## 2021-02-03 PROCEDURE — 96361 HYDRATE IV INFUSION ADD-ON: CPT

## 2021-02-03 PROCEDURE — 81001 URINALYSIS AUTO W/SCOPE: CPT | Performed by: EMERGENCY MEDICINE

## 2021-02-03 PROCEDURE — 80053 COMPREHEN METABOLIC PANEL: CPT | Performed by: EMERGENCY MEDICINE

## 2021-02-03 PROCEDURE — 87086 URINE CULTURE/COLONY COUNT: CPT | Performed by: EMERGENCY MEDICINE

## 2021-02-03 RX ORDER — CEFTRIAXONE 1 G/50ML
1 INJECTION, SOLUTION INTRAVENOUS ONCE
Status: COMPLETED | OUTPATIENT
Start: 2021-02-03 | End: 2021-02-03

## 2021-02-03 RX ORDER — CEFDINIR 300 MG/1
300 CAPSULE ORAL 2 TIMES DAILY
Qty: 14 CAPSULE | Refills: 0 | Status: SHIPPED | OUTPATIENT
Start: 2021-02-03

## 2021-02-03 RX ADMIN — SODIUM CHLORIDE 1000 ML: 9 INJECTION, SOLUTION INTRAVENOUS at 00:27

## 2021-02-03 RX ADMIN — CEFTRIAXONE 1 G: 1 INJECTION, SOLUTION INTRAVENOUS at 01:24

## 2021-02-03 NOTE — ED PROVIDER NOTES
Subjective   History of Present Illness    Chief Complaint: Fever  History of Present Illness: 89-year-old female with history of chronic pain, dementia, hypertension, shoulder contracture, arthritis from nursing home, reported fever resolved after Tylenol tonight.  Negative Covid test to nursing home yesterday.  1 month post admission for distal femur fracture.  History of dementia.  No other complaints  Onset: Tonight  Duration: Single episode by resolved with Tylenol  Exacerbating / Alleviating factors resolved  with Tylenol   associated symptoms: Unknown      Nurses Notes reviewed and agree, including vitals, allergies, social history and prior medical history.     REVIEW OF SYSTEMS: All systems reviewed and not pertinent unless noted.    Positive for: Reported fever    Negative for: Unable to obtain negative review of systems secondary to patient baseline mental status.  Review of Systems    Past Medical History:   Diagnosis Date   • Chronic pain    • Dementia (CMS/HCC)    • Dysuria    • Hematuria    • Hypertension    • Hypertensive heart disease without CHF    • Hypoxemia    • Osteoarthritis    • Shoulder joint contracture, right        Allergies   Allergen Reactions   • Ampicillin Unknown (See Comments)     PT UNABLE TO RECALL REACTION     • Sulfa Antibiotics Unknown (See Comments)     PT UNABLE TO RECALL REACTION         Past Surgical History:   Procedure Laterality Date   • FEMUR IM NAILING RETROGRADE Left 3/9/2018    Procedure: FEMUR LEFT DISTAL PERIPROSTHETIC FRACTURE INTRAMEDULLARY NAILING RETROGRADE;  Surgeon: Man Lyons MD;  Location: [facility] OR;  Service: Orthopedics   • FEMUR OPEN REDUCTION INTERNAL FIXATION Right 1/6/2021    Procedure: CLOSED REDUCTION AND CYLINDER CAST  DISTAL FEMUR RIGHT;  Surgeon: Man Lyons MD;  Location: [facility] OR;  Service: Orthopedics;  Laterality: Right;   • HIP BIPOLAR REPLACEMENT Right    • TOTAL KNEE ARTHROPLASTY Bilateral        History reviewed. No  pertinent family history.    Social History     Socioeconomic History   • Marital status:      Spouse name: Not on file   • Number of children: Not on file   • Years of education: Not on file   • Highest education level: Not on file   Tobacco Use   • Smoking status: Never Smoker   Substance and Sexual Activity   • Alcohol use: No   • Drug use: No   • Sexual activity: Never           Objective   Physical Exam    GENERAL APPEARANCE: Well developed, frail elderly 89-year-old white female,  in no acute distress.  VITAL SIGNS: per nursing, reviewed and noted  SKIN: exposed skin with no rashes, ulcerations or petechiae.  Head: Normocephalic, atraumatic.   EYES: perrla. EOMI.  ENT: Normal voice.  Patient maintained wearing a mask throughout patient encounter due to coronavirus pandemic  LUNGS:  No increased work of breathing. No retractions.   CARDIOVASCULAR:  regular rate and rhythm, no murmurs.  Good Peripheral pulses. Good cap refill to extremities.   ABDOMEN: Soft, nontender, normal bowel sounds. No hernia. No ascites.  MUSCULOSKELETAL:  No tenderness. Full ROM.  Age-related muscle atrophy without tenderness..  Right long-leg cast in place.   heel cups in place without heel ulcer on the left.  Unable to assess right heel secondary to forementioned cast.  NEUROLOGIC: Alert, follows commands.  No focal weakness.  NECK: Supple, symmetric. No tenderness, no masses. Full ROM  Back: full rom, no paraspinal spasm. No CVA tenderness.   PSYCH: Flat affect  : no bladder tenderness or distention, no CVA tenderness      Procedures     No attending physician procedures were performed on this patient.      ED Course  ED Course as of Feb 03 0133 Wed Feb 03, 2021   0037 WBC: 7.63 [PF]   0038 Hemoglobin(!): 10.5 [PF]   0038 Hematocrit(!): 33.4 [PF]   0038 Platelets: 262 [PF]   0038 Chest x-ray interpreted by me shows no evidence of any cardiomegaly, effusion, infiltrate, or bony abnormality.    [PF]   0130 WBC, UA(!): Too  Numerous to Count [PF]   0131 Bacteria, UA(!): 4+ [PF]   0131 Squamous Epithelial Cells, UA(!): 21-30 [PF]   0131 Lactate: 0.9 [PF]   0131 Appearance, UA(!): Turbid [PF]   0131 pH, UA: 6.0 [PF]   0131 Specific Gravity, UA: 1.015 [PF]   0131 Glucose: Negative [PF]   0131 Ketones, UA: Negative [PF]   0131 Bilirubin, UA: Negative [PF]   0131 Blood, UA(!): Small (1+) [PF]   0131 Protein, UA(!): 30 mg/dL (1+) [PF]   0131 Leukocytes, UA(!): Large (3+) [PF]   0131 Nitrite, UA: Negative [PF]   0131 Urobilinogen, UA: 1.0 E.U./dL [PF]   0131 Glucose(!): 143 [PF]   0131 BUN: 14 [PF]   0131 Creatinine(!): 0.43 [PF]   0131 Sodium: 136 [PF]   0131 Potassium: 4.2 [PF]   0131 Chloride: 101 [PF]   0131 CO2: 25.1 [PF]   0131 Calcium: 8.5 [PF]   0131 Total Protein(!): 5.8 [PF]   0131 Albumin(!): 3.20 [PF]   0131 ALT (SGPT): 6 [PF]   0131 AST (SGOT): 13 [PF]   0131 Alkaline Phosphatase(!): 165 [PF]   0131 Total Bilirubin: 0.6 [PF]   0131 COVID19: Not Detected [PF]      ED Course User Index  [PF] Jens Aj, DO                                           MDM  89-year-old female presents with nursing home with reported fever.  Evaluation complicated by patient's baseline dementia.  Patient is nontoxic without evidence of sepsis.  Work-up consistent with UTI.  Negative Covid negative chest x-ray.  Will discharge with Omnicef, urine culture pending.  Received Rocephin in ER.  Return precaution discussed.  Final diagnoses:   Urinary tract infection without hematuria, site unspecified   History of dementia   COVID-19 ruled out by laboratory testing            Jens Aj,   02/03/21 0133

## 2021-02-05 LAB — BACTERIA SPEC AEROBE CULT: ABNORMAL

## 2021-02-08 ENCOUNTER — APPOINTMENT (OUTPATIENT)
Dept: GENERAL RADIOLOGY | Facility: HOSPITAL | Age: 86
End: 2021-02-08

## 2021-02-08 ENCOUNTER — OFFICE VISIT (OUTPATIENT)
Dept: ORTHOPEDIC SURGERY | Facility: CLINIC | Age: 86
End: 2021-02-08

## 2021-02-08 VITALS — WEIGHT: 140 LBS | BODY MASS INDEX: 31.39 KG/M2 | RESPIRATION RATE: 20 BRPM | TEMPERATURE: 96.9 F

## 2021-02-08 DIAGNOSIS — S72.492A OTHER CLOSED FRACTURE OF DISTAL END OF LEFT FEMUR, INITIAL ENCOUNTER (HCC): Primary | ICD-10-CM

## 2021-02-08 DIAGNOSIS — S72.491A OTHER CLOSED FRACTURE OF DISTAL END OF RIGHT FEMUR, INITIAL ENCOUNTER (HCC): Primary | ICD-10-CM

## 2021-02-08 PROCEDURE — 73552 X-RAY EXAM OF FEMUR 2/>: CPT | Performed by: ORTHOPAEDIC SURGERY

## 2021-02-08 PROCEDURE — 99024 POSTOP FOLLOW-UP VISIT: CPT | Performed by: ORTHOPAEDIC SURGERY

## 2021-02-08 NOTE — PROGRESS NOTES
Subjective   Patient ID: Stefania Kirkpatrick is a 89 y.o. female is here today for follow-up for fracture recovery.  Post-op of the Right Femur (S/P  Right femur closed reduction, long leg cylinder cast for fracture 1/6/21/ )       CHIEF COMPLAINT:    Fracture follow up evaluation    History of Present Illness    Pain controlled: [] no   [x] yes   Medication refill requested: [x] no   [] yes    Patient compliant with instructions: [] no   [x] yes   Other: Patient transported on stretcher and appears comfortable.     Past Medical History:   Diagnosis Date   • Chronic pain    • Dementia (CMS/HCC)    • Dysuria    • Hematuria    • Hypertension    • Hypertensive heart disease without CHF    • Hypoxemia    • Osteoarthritis    • Shoulder joint contracture, right         Past Surgical History:   Procedure Laterality Date   • FEMUR IM NAILING RETROGRADE Left 3/9/2018    Procedure: FEMUR LEFT DISTAL PERIPROSTHETIC FRACTURE INTRAMEDULLARY NAILING RETROGRADE;  Surgeon: Man Lyons MD;  Location: [facility];  Service: Orthopedics   • FEMUR OPEN REDUCTION INTERNAL FIXATION Right 1/6/2021    Procedure: CLOSED REDUCTION AND CYLINDER CAST  DISTAL FEMUR RIGHT;  Surgeon: Man Lyons MD;  Location: [facility];  Service: Orthopedics;  Laterality: Right;   • HIP BIPOLAR REPLACEMENT Right    • TOTAL KNEE ARTHROPLASTY Bilateral         No family history on file.    Social History     Socioeconomic History   • Marital status:      Spouse name: Not on file   • Number of children: Not on file   • Years of education: Not on file   • Highest education level: Not on file   Tobacco Use   • Smoking status: Never Smoker   Substance and Sexual Activity   • Alcohol use: No   • Drug use: No   • Sexual activity: Never       Allergies   Allergen Reactions   • Ampicillin Unknown (See Comments)     PT UNABLE TO RECALL REACTION     • Sulfa Antibiotics Unknown (See Comments)     PT UNABLE TO RECALL REACTION     • Fentanyl Rash       Review of  Systems   Constitutional: Negative for fever.   Musculoskeletal: Positive for arthralgias and gait problem. Negative for joint swelling.   Skin: Negative for color change and rash.        No skin issues of right leg.  Left shin with soft healing echhymosis and no skin issues.   Neurological: Positive for weakness.     I have reviewed the medical and surgical history, family history, social history, medications, and/or allergies, and the review of systems of this report.    Objective   Temp 96.9 °F (36.1 °C)   Resp 20   Wt 63.5 kg (140 lb)   BMI 31.39 kg/m²      Signs of infection: [x] no                  [] yes   Swelling: [x] no                  [] yes   Skin wound: [] healing well   [] healed well   [x] skin intact   Motor exam intact: [] no                  [x] yes   Neurovascular exam intact: [] no                  [x] yes   Signs of compartment syndrome: [x] no                  [] yes   Signs of DVT: [x] no                  [] yes   Other: Gentle mobility of the left hip today is pain free with good response to recent hip injection therapy, left knee immobilizer removed today and to be discontinued.  Right long leg knee immobilizer applied after removing cylinder cast, and repeat radiographs showed improved alignment.     Physical Exam  Ortho Exam  Extremity DVT signs are negative on physical exam with negative Brandan sign, no calf pain, no palpable cords and no skin tone change   Neurologic Exam    Assessment/Plan     Independent Review of Radiographic Studies:    AP and lateral of the femur and knee, indication to evaluate fracture healing, and compared with prior imaging, shows interm external rotation of the distal fragment and early though minimal callus formation at this point.  The cylinder cast was removed, and the leg gently repositioned and placed in a knee immobilizer with a distal lateral padded bumper to promote maintenance of proper lower leg alignment.    Laboratory and Other Studies:  No new  results reviewed today.     Medical Decision Making:    Stable neurovascular and fracture follow-up exam.  Closed treatment of fracture and or dislocation.  Activity, restrictions as appropriate, non-weightbearing right leg.  Reviewed non-surgical and surgical options for patient's fracture by phone with her daughter Alis and questions answered.  Patient is a longstanding non-ambulator.  On behalf of patient daughter wishes to proceed with non-surgical fracture care understanding the limitations of the fracture healing position.  I recommended close weekly follow-up to assess the leg clinically and adjust the brace accordingly to achieve the best possible limited malalignment as the fracture heals and patient's daughter is agreeable with non-surgical plan.      Procedures    Diagnoses and all orders for this visit:    1. Other closed fracture of distal end of right femur, initial encounter (CMS/[facility]) (Primary)         Physical therapy: [x] rehab nursing home facility planned   Ultrasound: [x]not ordered         []order given to patient   Labs: [x]not ordered         []order given to patient   Weight Bearing status: []Full []WBAT []PWB [x]NWB right leg.     Discussion of orthopaedic goals and activities and patient and daughter expressed appreciation.  Cast, splint or brace care and assistive device usage instructions given  Weight bearing parameters reviewed and as noted above.    Recommendations/Plan:  Exercise, medications, injections, other patient advice, and return appointment as noted.  Splint/Cast: right cylinder cast removed today.  Brace:  Long leg knee immobilizer.  Test/Studies: No additional studies ordered at this time.  Work/Activity Status: Usual activities, routine exercise as tolerated, no strenuous activity. No use of involved extremity.     Return in about 1 week (around 2/15/2021) for XOA right femur, repeat exam, update plan.  Patient is encouraged and agreeable to call or return sooner for any  issues or concerns.

## 2021-02-22 ENCOUNTER — OFFICE VISIT (OUTPATIENT)
Dept: ORTHOPEDIC SURGERY | Facility: CLINIC | Age: 86
End: 2021-02-22

## 2021-02-22 VITALS — BODY MASS INDEX: 22.5 KG/M2 | TEMPERATURE: 97.1 F | RESPIRATION RATE: 18 BRPM | HEIGHT: 66 IN | WEIGHT: 140 LBS

## 2021-02-22 DIAGNOSIS — S72.491A OTHER CLOSED FRACTURE OF DISTAL END OF RIGHT FEMUR, INITIAL ENCOUNTER (HCC): Primary | ICD-10-CM

## 2021-02-22 PROCEDURE — 99024 POSTOP FOLLOW-UP VISIT: CPT | Performed by: PHYSICIAN ASSISTANT

## 2021-02-22 RX ORDER — MORPHINE SULFATE 20 MG/ML
SOLUTION ORAL
COMMUNITY
Start: 2021-01-19

## 2021-02-22 RX ORDER — FENTANYL 25 UG/H
PATCH TRANSDERMAL
COMMUNITY
Start: 2021-02-11

## 2021-02-22 RX ORDER — HALOPERIDOL 2 MG/ML
SOLUTION ORAL
COMMUNITY
Start: 2021-01-15

## 2021-02-22 RX ORDER — NITROFURANTOIN 25; 75 MG/1; MG/1
CAPSULE ORAL
COMMUNITY
End: 2021-02-22

## 2021-02-22 RX ORDER — HYDROCODONE BITARTRATE AND ACETAMINOPHEN 10; 325 MG/1; MG/1
TABLET ORAL
COMMUNITY
Start: 2021-01-15

## 2021-02-22 RX ORDER — LORAZEPAM 0.5 MG/1
TABLET ORAL
COMMUNITY
Start: 2021-01-15

## 2021-02-22 NOTE — PROGRESS NOTES
Subjective   Patient ID: Stefania Kirkpatrick is a 89 y.o. female is here today for a post-operative visit.  Post-op of the Right Femur (Right femur closed reduction, long leg cylinder cast for fracture 1/6/21, presents today in immobilizer with EMS, no family present)          CHIEF COMPLAINT:    Patient presents via EMS for scheduled follow-up visit regarding right femur closed reduction.  Initially patient had a long-leg cylinder cast due to fracture.  The cast was placed on 1/6/2021.  Patient does have advanced dementia and does not provide the HPI.    History of Present Illness        Past Medical History:   Diagnosis Date   • Chronic pain    • Dementia (CMS/HCC)    • Dysuria    • Hematuria    • Hypertension    • Hypertensive heart disease without CHF    • Hypoxemia    • Osteoarthritis    • Shoulder joint contracture, right         Past Surgical History:   Procedure Laterality Date   • FEMUR IM NAILING RETROGRADE Left 3/9/2018    Procedure: FEMUR LEFT DISTAL PERIPROSTHETIC FRACTURE INTRAMEDULLARY NAILING RETROGRADE;  Surgeon: Man Lyons MD;  Location: [facility];  Service: Orthopedics   • FEMUR OPEN REDUCTION INTERNAL FIXATION Right 1/6/2021    Procedure: CLOSED REDUCTION AND CYLINDER CAST  DISTAL FEMUR RIGHT;  Surgeon: Man Lyons MD;  Location: [facility] OR;  Service: Orthopedics;  Laterality: Right;   • HIP BIPOLAR REPLACEMENT Right    • TOTAL KNEE ARTHROPLASTY Bilateral        Allergies   Allergen Reactions   • Ampicillin Unknown (See Comments)     PT UNABLE TO RECALL REACTION     • Sulfa Antibiotics Unknown (See Comments)     PT UNABLE TO RECALL REACTION     • Fentanyl Rash       Review of Systems   Constitutional: Negative for diaphoresis, fever and unexpected weight change.   HENT: Negative for dental problem and sore throat.    Eyes: Negative for visual disturbance.   Respiratory: Negative for shortness of breath.    Cardiovascular: Negative for chest pain.   Gastrointestinal: Negative for abdominal  "pain, constipation, diarrhea, nausea and vomiting.   Genitourinary: Negative for difficulty urinating and frequency.   Musculoskeletal: Positive for arthralgias (right leg) and gait problem (presents on stretcher).   Neurological: Negative for headaches.   Hematological: Does not bruise/bleed easily.     I have reviewed the medical and surgical history, family history, social history, medications, and/or allergies, and the review of systems of this report.    Objective   Temp 97.1 °F (36.2 °C) (Skin)   Resp 18   Ht 167.6 cm (66\")   Wt 63.5 kg (140 lb)   BMI 22.60 kg/m²       Signs of infection: [x] no                    [] yes   Drainage: [x] no                    [] yes   Incision: [x] healing well     []healed well   Motor exam intact: [] no                    [x] yes   Neurovascular exam intact: [] no                    [x] yes   Signs of compartment syndrome: [x] no                    [] yes   Signs of DVT: [x] no                    [] yes   Other:      Physical Exam  Ortho Exam    Extremity DVT signs are negative on physical exam with negative Brandan sign, no calf pain, no palpable cords and no skin tone change  Neurologic Exam    Assessment/Plan     Independent Review of Radiographic Studies:    AP and lateral of the femur and knee, indication to evaluate fracture healing, and compared with prior imaging, shows interm external rotation of the distal fragment and early though minimal callus formation at this point.  a knee immobilizer is noted within xray imaging with a distal lateral padded bumper to promote maintenance of proper lower leg alignment         Procedures     Diagnoses and all orders for this visit:    1. Other closed fracture of distal end of right femur, initial encounter (CMS/[facility]) (Primary)    Other orders  -     Cancel: XR Femur 2 View Left; Future         Recommendations/Plan:     Sutures Staples or Pins [] Removed today  [] At prior visit  [] Plan removal later   Physical therapy: " [x]rehab facility  []outpatient referral  [] therapy ongoing   Ultrasound: []not ordered         []order given to patient   Labs: [x]not ordered         []order given to patient   Weight Bearing status: []Full []WBAT []PWB [x]NWB []Other     Follow-up in 4 weeks x-ray on arrival  The bottom of the knee immobilizer should be approximately 11 inches from the bottom of the heel to ensure proper position of the immobilizer  Patient is encouraged and agreeable to call or return sooner for any issues or concerns.

## 2021-03-22 ENCOUNTER — OFFICE VISIT (OUTPATIENT)
Dept: ORTHOPEDIC SURGERY | Facility: CLINIC | Age: 86
End: 2021-03-22

## 2021-03-22 VITALS
BODY MASS INDEX: 22.6 KG/M2 | SYSTOLIC BLOOD PRESSURE: 102 MMHG | RESPIRATION RATE: 18 BRPM | HEIGHT: 66 IN | DIASTOLIC BLOOD PRESSURE: 60 MMHG

## 2021-03-22 DIAGNOSIS — S72.491A OTHER CLOSED FRACTURE OF DISTAL END OF RIGHT FEMUR, INITIAL ENCOUNTER (HCC): Primary | ICD-10-CM

## 2021-03-22 PROCEDURE — 99024 POSTOP FOLLOW-UP VISIT: CPT | Performed by: PHYSICIAN ASSISTANT

## 2021-03-22 NOTE — PROGRESS NOTES
Subjective   Patient ID: Stefania Kirkpatrick is a 89 y.o. right hand dominant female  Follow-up of the Right Femur (1 month follow up right femur closed reduction for fracture 01/06/2021.)         History of Present Illness  Patient presents for scheduled follow-up visit regarding right femur closed reduction long-leg cylinder cast initially placed.  Date of procedure 1/6/2021  Patient is lying on a stretcher.  She is without complaints.  She has advanced dementia and does not verbally communicate.  She is a nonambulator and has been even before the closed reduction.                                                 Past Medical History:   Diagnosis Date   • Chronic pain    • Dementia (CMS/HCC)    • Dysuria    • Hematuria    • Hypertension    • Hypertensive heart disease without CHF    • Hypoxemia    • Osteoarthritis    • Shoulder joint contracture, right         Past Surgical History:   Procedure Laterality Date   • FEMUR IM NAILING RETROGRADE Left 3/9/2018    Procedure: FEMUR LEFT DISTAL PERIPROSTHETIC FRACTURE INTRAMEDULLARY NAILING RETROGRADE;  Surgeon: Man Lyons MD;  Location: [facility];  Service: Orthopedics   • FEMUR OPEN REDUCTION INTERNAL FIXATION Right 1/6/2021    Procedure: CLOSED REDUCTION AND CYLINDER CAST  DISTAL FEMUR RIGHT;  Surgeon: Man Lyons MD;  Location: [facility];  Service: Orthopedics;  Laterality: Right;   • HIP BIPOLAR REPLACEMENT Right    • TOTAL KNEE ARTHROPLASTY Bilateral        No family history on file.    Social History     Socioeconomic History   • Marital status:      Spouse name: Not on file   • Number of children: Not on file   • Years of education: Not on file   • Highest education level: Not on file   Tobacco Use   • Smoking status: Never Smoker   • Smokeless tobacco: Never Used   Substance and Sexual Activity   • Alcohol use: No   • Drug use: No   • Sexual activity: Never         Current Outpatient Medications:   •  acetaminophen (TYLENOL) 500 MG tablet, Take 500  mg by mouth Every 6 (Six) Hours As Needed for Mild Pain ., Disp: , Rfl:   •  amLODIPine (NORVASC) 5 MG tablet, Take 5 mg by mouth Daily., Disp: , Rfl:   •  apixaban (ELIQUIS) 5 MG tablet tablet, Take 5 mg by mouth 2 (Two) Times a Day., Disp: , Rfl:   •  bisacodyl (DULCOLAX) 10 MG suppository, Insert 1 suppository into the rectum Daily As Needed for Constipation., Disp: 60 suppository, Rfl: 5  •  cefdinir (OMNICEF) 300 MG capsule, Take 1 capsule by mouth 2 (Two) Times a Day., Disp: 14 capsule, Rfl: 0  •  celecoxib (CeleBREX) 100 MG capsule, Take 1 capsule by mouth Daily., Disp: 30 capsule, Rfl: 11  •  COVID-19 mRNA Vaccine, Pfizer, 30 MCG/0.3ML suspension, PfizerPockit COVID-19 Vaccine (PF) 30 mcg/0.3 mL IM suspension(EUA)  PHARMACY ADMINISTERED, Disp: , Rfl:   •  docusate sodium 100 MG capsule, Take 1 capsule by mouth 2 (Two) Times a Day As Needed for Constipation., Disp: 60 capsule, Rfl: 5  •  fentaNYL (DURAGESIC) 25 MCG/HR patch, , Disp: , Rfl:   •  haloperidol (HALDOL) 2 MG/ML solution, , Disp: , Rfl:   •  HYDROcodone-acetaminophen (NORCO)  MG per tablet, , Disp: , Rfl:   •  Lidocaine 4 % patch, Apply 4 % topically. Apply to left knee daily PRN, Disp: , Rfl:   •  LORazepam (ATIVAN) 0.5 MG tablet, , Disp: , Rfl:   •  magnesium hydroxide (MILK OF MAGNESIA) 2400 MG/10ML suspension suspension, Take 30 mL by mouth Daily As Needed., Disp: , Rfl:   •  melatonin 3 MG tablet, Take 3 mg by mouth Every Night., Disp: , Rfl:   •  morphine 100 MG/5ML solution concentrated solution, , Disp: , Rfl:   •  sertraline (ZOLOFT) 50 MG tablet, Take 50 mg by mouth Daily., Disp: , Rfl:   •  travoprost, BAK free, (TRAVATAN) 0.004 % solution ophthalmic solution, 1 drop Every Evening. in both eyes at 2200 (per MAR), Disp: , Rfl:     Allergies   Allergen Reactions   • Ampicillin Unknown (See Comments)     PT UNABLE TO RECALL REACTION     • Sulfa Antibiotics Unknown (See Comments)     PT UNABLE TO RECALL REACTION     • Fentanyl Rash  "      Review of Systems   Constitutional: Negative for diaphoresis, fever and unexpected weight change.   HENT: Negative for dental problem and sore throat.    Eyes: Negative for visual disturbance.   Respiratory: Negative for shortness of breath.    Cardiovascular: Negative for chest pain.   Gastrointestinal: Negative for abdominal pain, constipation, diarrhea, nausea and vomiting.   Genitourinary: Negative for difficulty urinating and frequency.   Musculoskeletal: Positive for arthralgias (Right femur).   Neurological: Negative for headaches.   Hematological: Does not bruise/bleed easily.       I have reviewed the medical and surgical history, family history, social history, medications, and/or allergies, and the review of systems of this report.    Objective   /60   Resp 18   Ht 167.6 cm (66\")   BMI 22.60 kg/m²    Physical Exam  Ortho Exam   Extremity DVT signs are negative on physical exam with negative Brandan sign, no calf pain, no palpable cords and no skin tone change   Neurologic Exam     There is quad atrophy to the right lower extremity  There is no redness or warmth to the lower extremity.  She does not grimace or indicate that she is in pain when the right lower extremity is palpated    Assessment/Plan   Independent Review of Radiographic Studies:    X-ray of the right femur 2 view performed in the office for evaluation of distal femur fracture healing.  Comparison films are available reviewed. Dr. Lyons also reviewed the imaging and concurs.  There has been no interval displacement.  There is callus deposition with periosteal bridging      Procedures       Diagnoses and all orders for this visit:    1. Other closed fracture of distal end of right femur, initial encounter (CMS/[facility]) (Primary)  -     XR Femur 2 View Right; Future             Recommendations/Plan:      Patient no longer requires the use of the knee immobilizer.  I would encourage range of motion with active and passive motion for " right quad strengthening.  Patient was a nonambulator before the procedure.  Patient agreeable to call or return sooner for any concerns.    Follow-up in 6 weeks x-ray on arrival             EMR Dragon-transcription disclaimer:  This encounter note is an electronic transcription of spoken language to printed text.  Electronic transcription of spoken language may permit erroneous or at times nonsensical words or phrases to be inadvertently transcribed.  Although I have reviewed the note for such errors, some may still exist

## 2021-04-26 ENCOUNTER — OFFICE VISIT (OUTPATIENT)
Dept: ORTHOPEDIC SURGERY | Facility: CLINIC | Age: 86
End: 2021-04-26

## 2021-04-26 DIAGNOSIS — S72.491A OTHER CLOSED FRACTURE OF DISTAL END OF RIGHT FEMUR, INITIAL ENCOUNTER (HCC): Primary | ICD-10-CM

## 2021-04-26 PROCEDURE — 99212 OFFICE O/P EST SF 10 MIN: CPT | Performed by: PHYSICIAN ASSISTANT

## 2021-04-26 NOTE — PROGRESS NOTES
Subjective   Patient ID: Stefania Kirkpatrick is a 89 y.o.  female  Follow-up of the Right Femur (Status post Right femur closed reduction, long leg cylinder cast for fracture 1/6/21./ )         History of Present Illness  Patient is following up for scheduled appointment regarding right femur fracture.  Patient is nonverbal in the HPI comes from the EMS crew.  Patient is lying in a stretcher.  She does not grimace or moan when palpated to the right lower extremity.                                                 Past Medical History:   Diagnosis Date   • Chronic pain    • Dementia (CMS/HCC)    • Dysuria    • Hematuria    • Hypertension    • Hypertensive heart disease without CHF    • Hypoxemia    • Osteoarthritis    • Shoulder joint contracture, right         Past Surgical History:   Procedure Laterality Date   • FEMUR IM NAILING RETROGRADE Left 3/9/2018    Procedure: FEMUR LEFT DISTAL PERIPROSTHETIC FRACTURE INTRAMEDULLARY NAILING RETROGRADE;  Surgeon: Man Lyons MD;  Location: [facility];  Service: Orthopedics   • FEMUR OPEN REDUCTION INTERNAL FIXATION Right 1/6/2021    Procedure: CLOSED REDUCTION AND CYLINDER CAST  DISTAL FEMUR RIGHT;  Surgeon: Man Lyons MD;  Location: [facility];  Service: Orthopedics;  Laterality: Right;   • HIP BIPOLAR REPLACEMENT Right    • TOTAL KNEE ARTHROPLASTY Bilateral        History reviewed. No pertinent family history.    Social History     Socioeconomic History   • Marital status:      Spouse name: Not on file   • Number of children: Not on file   • Years of education: Not on file   • Highest education level: Not on file   Tobacco Use   • Smoking status: Never Smoker   • Smokeless tobacco: Never Used   Substance and Sexual Activity   • Alcohol use: No   • Drug use: No   • Sexual activity: Never         Current Outpatient Medications:   •  acetaminophen (TYLENOL) 500 MG tablet, Take 500 mg by mouth Every 6 (Six) Hours As Needed for Mild Pain ., Disp: , Rfl:   •   amLODIPine (NORVASC) 5 MG tablet, Take 5 mg by mouth Daily., Disp: , Rfl:   •  apixaban (ELIQUIS) 5 MG tablet tablet, Take 5 mg by mouth 2 (Two) Times a Day., Disp: , Rfl:   •  bisacodyl (DULCOLAX) 10 MG suppository, Insert 1 suppository into the rectum Daily As Needed for Constipation., Disp: 60 suppository, Rfl: 5  •  cefdinir (OMNICEF) 300 MG capsule, Take 1 capsule by mouth 2 (Two) Times a Day., Disp: 14 capsule, Rfl: 0  •  celecoxib (CeleBREX) 100 MG capsule, Take 1 capsule by mouth Daily., Disp: 30 capsule, Rfl: 11  •  COVID-19 mRNA Vaccine, Pfizer, 30 MCG/0.3ML suspension, PfizerVISup COVID-19 Vaccine (PF) 30 mcg/0.3 mL IM suspension(EUA)  PHARMACY ADMINISTERED, Disp: , Rfl:   •  docusate sodium 100 MG capsule, Take 1 capsule by mouth 2 (Two) Times a Day As Needed for Constipation., Disp: 60 capsule, Rfl: 5  •  fentaNYL (DURAGESIC) 25 MCG/HR patch, , Disp: , Rfl:   •  haloperidol (HALDOL) 2 MG/ML solution, , Disp: , Rfl:   •  HYDROcodone-acetaminophen (NORCO)  MG per tablet, , Disp: , Rfl:   •  Lidocaine 4 % patch, Apply 4 % topically. Apply to left knee daily PRN, Disp: , Rfl:   •  LORazepam (ATIVAN) 0.5 MG tablet, , Disp: , Rfl:   •  magnesium hydroxide (MILK OF MAGNESIA) 2400 MG/10ML suspension suspension, Take 30 mL by mouth Daily As Needed., Disp: , Rfl:   •  melatonin 3 MG tablet, Take 3 mg by mouth Every Night., Disp: , Rfl:   •  morphine 100 MG/5ML solution concentrated solution, , Disp: , Rfl:   •  sertraline (ZOLOFT) 50 MG tablet, Take 50 mg by mouth Daily., Disp: , Rfl:   •  travoprost, BAK free, (TRAVATAN) 0.004 % solution ophthalmic solution, 1 drop Every Evening. in both eyes at 2200 (per MAR), Disp: , Rfl:     Allergies   Allergen Reactions   • Ampicillin Unknown (See Comments)     PT UNABLE TO RECALL REACTION     • Sulfa Antibiotics Unknown (See Comments)     PT UNABLE TO RECALL REACTION     • Fentanyl Rash       Review of systems-are unobtainable due to the patient's Alzheimer's  dementia    I have reviewed the medical and surgical history, family history, social history, medications, and/or allergies, and the review of systems of this report.    Objective   There were no vitals taken for this visit.   Physical Exam  Ortho Exam   Extremity DVT signs are negative on physical exam with negative Brandan sign, no calf pain, no palpable cords and no skin tone change   Neurologic Exam     Patient does have significant lower leg muscle atrophy  Patient is neurovascularly intact to the right lower extremity.  The right knee and lower leg is laterally rotated      Assessment/Plan   Independent Review of Radiographic Studies:    The right femur 2 view performed in the office reveals callus formation with a laterally malrotated distal femur fracture.  There is prior evidence of total knee replacement hardware      Procedures       Diagnoses and all orders for this visit:    1. Other closed fracture of distal end of right femur, initial encounter (CMS/[facility]) (Primary)  -     XR Femur 2 View Right; Future       Discussion of orthopedic goals  Ice, heat, and/or modalities as beneficial    Recommendations/Plan:    Encourage ROM exercises to BLE as tolerated.     Follow up in 12 weeks     Patient agreeable to call or return sooner for any concerns.             EMR Dragon-transcription disclaimer:  This encounter note is an electronic transcription of spoken language to printed text.  Electronic transcription of spoken language may permit erroneous or at times nonsensical words or phrases to be inadvertently transcribed.  Although I have reviewed the note for such errors, some may still exist

## 2021-07-19 ENCOUNTER — HOSPITAL ENCOUNTER (EMERGENCY)
Facility: HOSPITAL | Age: 86
Discharge: NURSING, REHAB OR LONG-TERM CARE | End: 2021-07-19
Attending: EMERGENCY MEDICINE | Admitting: EMERGENCY MEDICINE

## 2021-07-19 ENCOUNTER — APPOINTMENT (OUTPATIENT)
Dept: GENERAL RADIOLOGY | Facility: HOSPITAL | Age: 86
End: 2021-07-19

## 2021-07-19 ENCOUNTER — APPOINTMENT (OUTPATIENT)
Dept: CT IMAGING | Facility: HOSPITAL | Age: 86
End: 2021-07-19

## 2021-07-19 VITALS
WEIGHT: 135 LBS | TEMPERATURE: 97.4 F | SYSTOLIC BLOOD PRESSURE: 129 MMHG | HEART RATE: 75 BPM | RESPIRATION RATE: 24 BRPM | HEIGHT: 65 IN | OXYGEN SATURATION: 94 % | DIASTOLIC BLOOD PRESSURE: 55 MMHG | BODY MASS INDEX: 22.49 KG/M2

## 2021-07-19 DIAGNOSIS — J69.0 ASPIRATION PNEUMONIA, UNSPECIFIED ASPIRATION PNEUMONIA TYPE, UNSPECIFIED LATERALITY, UNSPECIFIED PART OF LUNG (HCC): Primary | ICD-10-CM

## 2021-07-19 DIAGNOSIS — J96.01 ACUTE RESPIRATORY FAILURE WITH HYPOXIA (HCC): ICD-10-CM

## 2021-07-19 LAB
A-A DO2: 447.6 MMHG
ALBUMIN SERPL-MCNC: 4.2 G/DL (ref 3.5–5.2)
ALBUMIN/GLOB SERPL: 1.2 G/DL
ALP SERPL-CCNC: 72 U/L (ref 39–117)
ALT SERPL W P-5'-P-CCNC: 11 U/L (ref 1–33)
ANION GAP SERPL CALCULATED.3IONS-SCNC: 17.1 MMOL/L (ref 5–15)
ARTERIAL PATENCY WRIST A: ABNORMAL
AST SERPL-CCNC: 21 U/L (ref 1–32)
ATMOSPHERIC PRESS: 737 MMHG
B PARAPERT DNA SPEC QL NAA+PROBE: NOT DETECTED
B PERT DNA SPEC QL NAA+PROBE: NOT DETECTED
BASE EXCESS BLDA CALC-SCNC: 2.8 MMOL/L (ref 0–2)
BASOPHILS # BLD AUTO: 0.05 10*3/MM3 (ref 0–0.2)
BASOPHILS NFR BLD AUTO: 0.3 % (ref 0–1.5)
BDY SITE: ABNORMAL
BILIRUB SERPL-MCNC: 0.8 MG/DL (ref 0–1.2)
BUN SERPL-MCNC: 20 MG/DL (ref 8–23)
BUN/CREAT SERPL: 16.3 (ref 7–25)
C PNEUM DNA NPH QL NAA+NON-PROBE: NOT DETECTED
CALCIUM SPEC-SCNC: 10.2 MG/DL (ref 8.2–9.6)
CHLORIDE SERPL-SCNC: 99 MMOL/L (ref 98–107)
CO2 SERPL-SCNC: 23.9 MMOL/L (ref 22–29)
COHGB MFR BLD: 0.6 % (ref 0–2)
CREAT SERPL-MCNC: 1.23 MG/DL (ref 0.57–1)
D-LACTATE SERPL-SCNC: 4.8 MMOL/L (ref 0.5–2)
DEPRECATED RDW RBC AUTO: 53.1 FL (ref 37–54)
EOSINOPHIL # BLD AUTO: 0.01 10*3/MM3 (ref 0–0.4)
EOSINOPHIL NFR BLD AUTO: 0.1 % (ref 0.3–6.2)
ERYTHROCYTE [DISTWIDTH] IN BLOOD BY AUTOMATED COUNT: 16 % (ref 12.3–15.4)
FLUAV SUBTYP SPEC NAA+PROBE: NOT DETECTED
FLUBV RNA ISLT QL NAA+PROBE: NOT DETECTED
GFR SERPL CREATININE-BSD FRML MDRD: 40 ML/MIN/1.73
GLOBULIN UR ELPH-MCNC: 3.4 GM/DL
GLUCOSE SERPL-MCNC: 201 MG/DL (ref 65–99)
HADV DNA SPEC NAA+PROBE: NOT DETECTED
HCO3 BLDA-SCNC: 27.7 MMOL/L (ref 22–28)
HCOV 229E RNA SPEC QL NAA+PROBE: NOT DETECTED
HCOV HKU1 RNA SPEC QL NAA+PROBE: NOT DETECTED
HCOV NL63 RNA SPEC QL NAA+PROBE: NOT DETECTED
HCOV OC43 RNA SPEC QL NAA+PROBE: NOT DETECTED
HCT VFR BLD AUTO: 42.6 % (ref 34–46.6)
HCT VFR BLD CALC: 45.2 %
HGB BLD-MCNC: 14.1 G/DL (ref 12–15.9)
HMPV RNA NPH QL NAA+NON-PROBE: NOT DETECTED
HOLD SPECIMEN: NORMAL
HPIV1 RNA SPEC QL NAA+PROBE: NOT DETECTED
HPIV2 RNA SPEC QL NAA+PROBE: NOT DETECTED
HPIV3 RNA NPH QL NAA+PROBE: NOT DETECTED
HPIV4 P GENE NPH QL NAA+PROBE: NOT DETECTED
IMM GRANULOCYTES # BLD AUTO: 0.06 10*3/MM3 (ref 0–0.05)
IMM GRANULOCYTES NFR BLD AUTO: 0.4 % (ref 0–0.5)
INHALED O2 CONCENTRATION: 80 %
LYMPHOCYTES # BLD AUTO: 0.62 10*3/MM3 (ref 0.7–3.1)
LYMPHOCYTES NFR BLD AUTO: 4.2 % (ref 19.6–45.3)
Lab: ABNORMAL
M PNEUMO IGG SER IA-ACNC: NOT DETECTED
MCH RBC QN AUTO: 29.7 PG (ref 26.6–33)
MCHC RBC AUTO-ENTMCNC: 33.1 G/DL (ref 31.5–35.7)
MCV RBC AUTO: 89.7 FL (ref 79–97)
METHGB BLD QL: 1 % (ref 0–1.5)
MODALITY: ABNORMAL
MONOCYTES # BLD AUTO: 0.52 10*3/MM3 (ref 0.1–0.9)
MONOCYTES NFR BLD AUTO: 3.5 % (ref 5–12)
NEUTROPHILS NFR BLD AUTO: 13.65 10*3/MM3 (ref 1.7–7)
NEUTROPHILS NFR BLD AUTO: 91.5 % (ref 42.7–76)
NOTE: ABNORMAL
NRBC BLD AUTO-RTO: 0 /100 WBC (ref 0–0.2)
NT-PROBNP SERPL-MCNC: 1627 PG/ML (ref 0–1800)
OXYHGB MFR BLDV: 88.8 % (ref 94–99)
PCO2 BLDA: 42.6 MM HG (ref 35–45)
PCO2 TEMP ADJ BLD: ABNORMAL MM[HG]
PH BLDA: 7.42 PH UNITS (ref 7.35–7.45)
PH, TEMP CORRECTED: ABNORMAL
PLAT MORPH BLD: NORMAL
PLATELET # BLD AUTO: 256 10*3/MM3 (ref 140–450)
PMV BLD AUTO: 10.6 FL (ref 6–12)
PO2 BLDA: 60.2 MM HG (ref 75–100)
PO2 TEMP ADJ BLD: ABNORMAL MM[HG]
POTASSIUM SERPL-SCNC: 4.5 MMOL/L (ref 3.5–5.2)
PROCALCITONIN SERPL-MCNC: 6.98 NG/ML (ref 0–0.25)
PROT SERPL-MCNC: 7.6 G/DL (ref 6–8.5)
RBC # BLD AUTO: 4.75 10*6/MM3 (ref 3.77–5.28)
RBC MORPH BLD: NORMAL
RHINOVIRUS RNA SPEC NAA+PROBE: NOT DETECTED
RSV RNA NPH QL NAA+NON-PROBE: NOT DETECTED
SAO2 % BLDCOA: 90.2 % (ref 94–100)
SARS-COV-2 RNA NPH QL NAA+NON-PROBE: NOT DETECTED
SODIUM SERPL-SCNC: 140 MMOL/L (ref 136–145)
TROPONIN T SERPL-MCNC: <0.01 NG/ML (ref 0–0.03)
VENTILATOR MODE: ABNORMAL
WBC # BLD AUTO: 14.91 10*3/MM3 (ref 3.4–10.8)
WBC MORPH BLD: NORMAL
WHOLE BLOOD HOLD SPECIMEN: NORMAL

## 2021-07-19 PROCEDURE — 82375 ASSAY CARBOXYHB QUANT: CPT

## 2021-07-19 PROCEDURE — 96375 TX/PRO/DX INJ NEW DRUG ADDON: CPT

## 2021-07-19 PROCEDURE — 93005 ELECTROCARDIOGRAM TRACING: CPT | Performed by: EMERGENCY MEDICINE

## 2021-07-19 PROCEDURE — 71250 CT THORAX DX C-: CPT

## 2021-07-19 PROCEDURE — 71045 X-RAY EXAM CHEST 1 VIEW: CPT

## 2021-07-19 PROCEDURE — 96365 THER/PROPH/DIAG IV INF INIT: CPT

## 2021-07-19 PROCEDURE — 83880 ASSAY OF NATRIURETIC PEPTIDE: CPT | Performed by: EMERGENCY MEDICINE

## 2021-07-19 PROCEDURE — 83050 HGB METHEMOGLOBIN QUAN: CPT

## 2021-07-19 PROCEDURE — 83605 ASSAY OF LACTIC ACID: CPT | Performed by: EMERGENCY MEDICINE

## 2021-07-19 PROCEDURE — 85007 BL SMEAR W/DIFF WBC COUNT: CPT | Performed by: EMERGENCY MEDICINE

## 2021-07-19 PROCEDURE — 25010000002 CEFEPIME-DEXTROSE 2-5 GM-%(50ML) RECONSTITUTED SOLUTION: Performed by: EMERGENCY MEDICINE

## 2021-07-19 PROCEDURE — 0202U NFCT DS 22 TRGT SARS-COV-2: CPT | Performed by: EMERGENCY MEDICINE

## 2021-07-19 PROCEDURE — 87040 BLOOD CULTURE FOR BACTERIA: CPT | Performed by: EMERGENCY MEDICINE

## 2021-07-19 PROCEDURE — 84145 PROCALCITONIN (PCT): CPT | Performed by: EMERGENCY MEDICINE

## 2021-07-19 PROCEDURE — 84484 ASSAY OF TROPONIN QUANT: CPT | Performed by: EMERGENCY MEDICINE

## 2021-07-19 PROCEDURE — 36600 WITHDRAWAL OF ARTERIAL BLOOD: CPT

## 2021-07-19 PROCEDURE — 85025 COMPLETE CBC W/AUTO DIFF WBC: CPT | Performed by: EMERGENCY MEDICINE

## 2021-07-19 PROCEDURE — 25010000002 NALOXONE PER 1 MG: Performed by: EMERGENCY MEDICINE

## 2021-07-19 PROCEDURE — 99284 EMERGENCY DEPT VISIT MOD MDM: CPT

## 2021-07-19 PROCEDURE — 80053 COMPREHEN METABOLIC PANEL: CPT | Performed by: EMERGENCY MEDICINE

## 2021-07-19 PROCEDURE — 82805 BLOOD GASES W/O2 SATURATION: CPT

## 2021-07-19 RX ORDER — SODIUM CHLORIDE 0.9 % (FLUSH) 0.9 %
10 SYRINGE (ML) INJECTION AS NEEDED
Status: DISCONTINUED | OUTPATIENT
Start: 2021-07-19 | End: 2021-07-19 | Stop reason: HOSPADM

## 2021-07-19 RX ORDER — CEFEPIME HYDROCHLORIDE 2 G/50ML
2 INJECTION, SOLUTION INTRAVENOUS ONCE
Status: COMPLETED | OUTPATIENT
Start: 2021-07-19 | End: 2021-07-19

## 2021-07-19 RX ORDER — NALOXONE HCL 0.4 MG/ML
0.4 VIAL (ML) INJECTION ONCE
Status: COMPLETED | OUTPATIENT
Start: 2021-07-19 | End: 2021-07-19

## 2021-07-19 RX ADMIN — NALOXONE HYDROCHLORIDE 0.4 MG: 0.4 INJECTION, SOLUTION INTRAMUSCULAR; INTRAVENOUS; SUBCUTANEOUS at 09:35

## 2021-07-19 RX ADMIN — CEFEPIME HYDROCHLORIDE 2 G: 2 INJECTION, SOLUTION INTRAVENOUS at 13:05

## 2021-07-19 RX ADMIN — SODIUM CHLORIDE 1000 ML: 9 INJECTION, SOLUTION INTRAVENOUS at 12:34

## 2021-07-19 NOTE — ED NOTES
Nursing home [facility] called with x 2 with no answer. Called the [facility] to ask for patient's admitting hospice doctor-- they stated is was Dr. Garcia. Dr. Salter notified.      Breann Mccabe, KRISH  07/19/21 [id number]

## 2021-07-19 NOTE — ED NOTES
[facility] with [facility]. Voicemail left.      Breann Mccabe, RN  07/19/21 8225

## 2021-07-19 NOTE — ED PROVIDER NOTES
TRIAGE CHIEF COMPLAINT:     Nursing and triage notes reviewed    Chief Complaint   Patient presents with   • Shortness of Breath      HPI: Stefania Kirkpatrick is a 89 y.o. female who presents to the emergency department complaining of shortness of breath.  Patient sent in from nursing home due to oxygen desaturation.  Patient is currently in hospice care and is on multiple sedating medications including a fentanyl patch, morphine, and Ativan.  She was last given these around 3 this morning.  Patient is very drowsy on arrival and normally does not require oxygen.  Per nursing home report they could not get her oxygen saturation over the mid 80s.  Patient does have improved oxygen saturation on arrival in the emergency department.  Patient is very drowsy and unable to provide much in the way of history.    REVIEW OF SYSTEMS: All other systems reviewed and are negative (obtained aspects as possible given patient's mental status)    PAST MEDICAL HISTORY:   Past Medical History:   Diagnosis Date   • Chronic pain    • Dementia (CMS/HCC)    • Dysuria    • Hematuria    • Hypertension    • Hypertensive heart disease without CHF    • Hypoxemia    • Osteoarthritis    • Shoulder joint contracture, right         FAMILY HISTORY:   No family history on file.     SOCIAL HISTORY:   Social History     Socioeconomic History   • Marital status:      Spouse name: Not on file   • Number of children: Not on file   • Years of education: Not on file   • Highest education level: Not on file   Tobacco Use   • Smoking status: Never Smoker   • Smokeless tobacco: Never Used   Substance and Sexual Activity   • Alcohol use: No   • Drug use: No   • Sexual activity: Never        SURGICAL HISTORY:   Past Surgical History:   Procedure Laterality Date   • FEMUR IM NAILING RETROGRADE Left 3/9/2018    Procedure: FEMUR LEFT DISTAL PERIPROSTHETIC FRACTURE INTRAMEDULLARY NAILING RETROGRADE;  Surgeon: Man Lyons MD;  Location: [facility];  Service:  Orthopedics   • FEMUR OPEN REDUCTION INTERNAL FIXATION Right 1/6/2021    Procedure: CLOSED REDUCTION AND CYLINDER CAST  DISTAL FEMUR RIGHT;  Surgeon: Man Lyons MD;  Location: [facility];  Service: Orthopedics;  Laterality: Right;   • HIP BIPOLAR REPLACEMENT Right    • TOTAL KNEE ARTHROPLASTY Bilateral         CURRENT MEDICATIONS:      Medication List      ASK your doctor about these medications    acetaminophen 500 MG tablet  Commonly known as: TYLENOL     amLODIPine 5 MG tablet  Commonly known as: NORVASC     apixaban 5 MG tablet tablet  Commonly known as: ELIQUIS     bisacodyl 10 MG suppository  Commonly known as: DULCOLAX  Insert 1 suppository into the rectum Daily As Needed for Constipation.     cefdinir 300 MG capsule  Commonly known as: OMNICEF  Take 1 capsule by mouth 2 (Two) Times a Day.     celecoxib 100 MG capsule  Commonly known as: CeleBREX  Take 1 capsule by mouth Daily.     COVID-19 mRNA Vaccine (Pfizer) 30 MCG/0.3ML suspension     docusate sodium 100 MG capsule  Take 1 capsule by mouth 2 (Two) Times a Day As Needed for Constipation.     fentaNYL 25 MCG/HR patch  Commonly known as: DURAGESIC     haloperidol 2 MG/ML solution  Commonly known as: HALDOL     HYDROcodone-acetaminophen  MG per tablet  Commonly known as: NORCO     Lidocaine 4 % patch     LORazepam 0.5 MG tablet  Commonly known as: ATIVAN     magnesium hydroxide 2400 MG/10ML suspension suspension  Commonly known as: MILK OF MAGNESIA     melatonin 3 MG tablet     morphine 100 MG/5ML solution concentrated solution     sertraline 50 MG tablet  Commonly known as: ZOLOFT     travoprost (BAK free) 0.004 % solution ophthalmic solution  Commonly known as: TRAVATAN             ALLERGIES: Ampicillin, Sulfa antibiotics, and Fentanyl     PHYSICAL EXAM:   VITAL SIGNS:   Vitals:    07/19/21 0859   BP:    Pulse:    Resp:    Temp: 97.4 °F (36.3 °C)   SpO2:       CONSTITUTIONAL: Very drowsy, sleeping, does not appear distressed  HENT:  Atraumatic, normocephalic, oral mucosa pink and moist, airway patent. Nares patent without drainage. External ears normal.   EYES: Conjunctiva clear   NECK: Trachea midline   CARDIOVASCULAR: Normal heart rate, Normal rhythm, No murmurs, rubs, gallops   PULMONARY/CHEST: Coarse lung sounds throughout.  No increased work of breathing.  ABDOMINAL: Non-distended, soft, non-tender - no rebound or guarding.  NEUROLOGIC: Non-focal, moving all four extremities   EXTREMITIES: No clubbing, cyanosis, or edema   SKIN: Warm, Dry, No erythema, No rash     ED COURSE / MEDICAL DECISION MAKING:   Stefania Kirkpatrick is a 89 y.o. female who presents to the emergency department for evaluation of shortness of breath and hypoxia.  Patient does arrive on supplemental oxygen due to decreased oxygen saturation.  Patient does not appear distressed but is very drowsy and sleeping on arrival.  There are coarse lung sounds throughout.  No obvious retractions appreciated.  For the time being patient left on her oxygen therapy.  Removed fentanyl patch on arrival.  Will give small dose of Narcan and see if this helps her symptoms.  We will also obtain an x-ray and labs for further evaluation.    EKG interpreted by me reveals sinus rhythm with a rate of 84 bpm.  There is a right bundle branch block.  This is an abnormal appearing EKG.    Patient did have mildly elevated white blood cell count, procalcitonin, lactic acid.    Other labs were largely around her baseline.  Blood gas was reassuring while on supplemental oxygen.    I was able to speak to patient's daughter who was able to provide some further history that had been omitted when report was given to us.  Patient reportedly had aspirated while eating yesterday and has been coughing since that time.  Per daughter she had done well yesterday but they were can watch closely and she began to decompensate overnight which would be consistent with aspiration.  Imaging studies confirmed that patient had a  likely aspiration pneumonia.    The daughter was able to confirm that patient has a DO NOT RESUSCITATE order and did not want any heroic measures.  She advised that we speak with her primary care physician Dr. Garcia cares for her regularly.  I was able to contact him and he suggested we leave patient on oxygen and treat her with antibiotics for the next few days but allow her to return to the nursing home as being in the hospital currently would not add to her quality of life.  The patient's daughter was comfortable with this plan of care.  Patient was discharged with oxygen and instructions for antibiotics.    Critical care time for this encounter excluding any procedure: 30 minutes    DECISION TO DISCHARGE/ADMIT: see ED care timeline     FINAL IMPRESSION:   1 --respiratory failure  2 --aspiration pneumonia  3 --     Electronically signed by: Katerin Salter MD, 7/19/2021 09:37 Katerin Riley MD  07/19/21 [id number]

## 2021-07-24 LAB
BACTERIA SPEC AEROBE CULT: NORMAL
BACTERIA SPEC AEROBE CULT: NORMAL

## (undated) DEVICE — VIOLET BRAIDED (POLYGLACTIN 910), SYNTHETIC ABSORBABLE SUTURE: Brand: COATED VICRYL

## (undated) DEVICE — GW TIB BALL NOSE 3MM 100CM

## (undated) DEVICE — SUT VIC 2/0 CT1 27IN J259H

## (undated) DEVICE — 3M™ IOBAN™ 2 ANTIMICROBIAL INCISE DRAPE 6650EZ: Brand: IOBAN™ 2

## (undated) DEVICE — SHEET,DRAPE,70X100,STERILE: Brand: MEDLINE

## (undated) DEVICE — PK HIP GEN 20

## (undated) DEVICE — PAD CAST SPECIST 4IN

## (undated) DEVICE — 3M(TM) TEGADERM(TM) TRANSPARENT FILM DRESSING FRAME STYLE 1622W: Brand: 3M™ TEGADERM™

## (undated) DEVICE — 3M™ TEGADERM™ IV TRANSPARENT FILM DRESSING WITH BORDER 100 BAGS/CARTON 4 CARTONS/CASE 1633: Brand: 3M™ TEGADERM™

## (undated) DEVICE — CLAVICLE STRAP: Brand: DEROYAL

## (undated) DEVICE — PROXIMATE SKIN STAPLERS (35 WIDE) CONTAINS 35 STAINLESS STEEL STAPLES (FIXED HEAD): Brand: PROXIMATE

## (undated) DEVICE — GLV SURG SENSICARE W/ALOE PF LF 8 STRL

## (undated) DEVICE — Device

## (undated) DEVICE — SPNG GZ WOVN 4X4IN 12PLY 10/BX STRL

## (undated) DEVICE — OCCLUSIVE GAUZE STRIP,3% BISMUTH TRIBROMOPHENATE IN PETROLATUM BLEND: Brand: XEROFORM

## (undated) DEVICE — IMPLANTABLE DEVICE
Type: IMPLANTABLE DEVICE | Site: FEMUR | Status: NON-FUNCTIONAL
Removed: 2018-03-09